# Patient Record
Sex: FEMALE | Race: WHITE | NOT HISPANIC OR LATINO | Employment: UNEMPLOYED | URBAN - METROPOLITAN AREA
[De-identification: names, ages, dates, MRNs, and addresses within clinical notes are randomized per-mention and may not be internally consistent; named-entity substitution may affect disease eponyms.]

---

## 2017-07-02 ENCOUNTER — HOSPITAL ENCOUNTER (INPATIENT)
Facility: HOSPITAL | Age: 32
LOS: 1 days | Discharge: HOME | DRG: 813 | End: 2017-07-03
Attending: EMERGENCY MEDICINE | Admitting: INTERNAL MEDICINE
Payer: MEDICAID

## 2017-07-02 DIAGNOSIS — D69.3 ACUTE ITP: ICD-10-CM

## 2017-07-02 DIAGNOSIS — E03.9 HYPOTHYROIDISM, UNSPECIFIED TYPE: ICD-10-CM

## 2017-07-02 DIAGNOSIS — J44.1 COPD EXACERBATION: Primary | ICD-10-CM

## 2017-07-02 PROCEDURE — 96374 THER/PROPH/DIAG INJ IV PUSH: CPT

## 2017-07-02 PROCEDURE — 99284 EMERGENCY DEPT VISIT MOD MDM: CPT | Mod: 25

## 2017-07-02 PROCEDURE — 96361 HYDRATE IV INFUSION ADD-ON: CPT

## 2017-07-02 RX ORDER — TRAZODONE HYDROCHLORIDE 150 MG/1
150 TABLET ORAL NIGHTLY
COMMUNITY
End: 2020-11-23

## 2017-07-02 RX ORDER — ALPRAZOLAM 1 MG/1
1 TABLET ORAL NIGHTLY PRN
COMMUNITY
End: 2018-12-13 | Stop reason: SDUPTHER

## 2017-07-02 RX ORDER — LEVOTHYROXINE SODIUM 175 UG/1
175 TABLET ORAL DAILY
COMMUNITY
End: 2018-12-13 | Stop reason: SDUPTHER

## 2017-07-03 VITALS
BODY MASS INDEX: 33.99 KG/M2 | WEIGHT: 180 LBS | DIASTOLIC BLOOD PRESSURE: 70 MMHG | TEMPERATURE: 98 F | SYSTOLIC BLOOD PRESSURE: 111 MMHG | OXYGEN SATURATION: 95 % | HEIGHT: 61 IN | RESPIRATION RATE: 16 BRPM | HEART RATE: 55 BPM

## 2017-07-03 PROBLEM — D69.6 THROMBOCYTOPENIA: Status: ACTIVE | Noted: 2017-07-03

## 2017-07-03 PROBLEM — D69.3 ACUTE ITP: Status: ACTIVE | Noted: 2017-07-03

## 2017-07-03 PROBLEM — J44.1 COPD EXACERBATION: Status: ACTIVE | Noted: 2017-07-03

## 2017-07-03 LAB
ALBUMIN SERPL BCP-MCNC: 3.8 G/DL
ALP SERPL-CCNC: 58 U/L
ALT SERPL W/O P-5'-P-CCNC: 14 U/L
ANION GAP SERPL CALC-SCNC: 10 MMOL/L
AST SERPL-CCNC: 24 U/L
BASOPHILS # BLD AUTO: 0 K/UL
BASOPHILS NFR BLD: 0.5 %
BILIRUB SERPL-MCNC: 0.5 MG/DL
BILIRUB UR QL STRIP: NEGATIVE
BUN SERPL-MCNC: 9 MG/DL
CALCIUM SERPL-MCNC: 9.2 MG/DL
CHLORIDE SERPL-SCNC: 103 MMOL/L
CK SERPL-CCNC: 270 U/L
CLARITY UR: CLEAR
CO2 SERPL-SCNC: 29 MMOL/L
COLOR UR: YELLOW
CREAT SERPL-MCNC: 1.2 MG/DL
CRP SERPL-MCNC: 8.4 MG/L
DIFFERENTIAL METHOD: ABNORMAL
EOSINOPHIL # BLD AUTO: 0.2 K/UL
EOSINOPHIL NFR BLD: 3.4 %
ERYTHROCYTE [DISTWIDTH] IN BLOOD BY AUTOMATED COUNT: 14.6 %
EST. GFR  (AFRICAN AMERICAN): >60 ML/MIN/1.73 M^2
EST. GFR  (NON AFRICAN AMERICAN): >60 ML/MIN/1.73 M^2
GLUCOSE SERPL-MCNC: 71 MG/DL
GLUCOSE UR QL STRIP: NEGATIVE
HCT VFR BLD AUTO: 39.5 %
HGB BLD-MCNC: 13.3 G/DL
HGB UR QL STRIP: NEGATIVE
KETONES UR QL STRIP: NEGATIVE
LEUKOCYTE ESTERASE UR QL STRIP: NEGATIVE
LYMPHOCYTES # BLD AUTO: 2.3 K/UL
LYMPHOCYTES NFR BLD: 36.6 %
MCH RBC QN AUTO: 29.8 PG
MCHC RBC AUTO-ENTMCNC: 33.6 %
MCV RBC AUTO: 89 FL
MONOCYTES # BLD AUTO: 0.4 K/UL
MONOCYTES NFR BLD: 6.2 %
NEUTROPHILS # BLD AUTO: 3.3 K/UL
NEUTROPHILS NFR BLD: 53.3 %
NITRITE UR QL STRIP: NEGATIVE
PH UR STRIP: 6 [PH] (ref 5–8)
PLATELET # BLD AUTO: 134 K/UL
PMV BLD AUTO: 9.9 FL
POTASSIUM SERPL-SCNC: 3.8 MMOL/L
PROT SERPL-MCNC: 7.2 G/DL
PROT UR QL STRIP: NEGATIVE
RBC # BLD AUTO: 4.45 M/UL
SODIUM SERPL-SCNC: 142 MMOL/L
SP GR UR STRIP: >=1.03 (ref 1–1.03)
T4 FREE SERPL-MCNC: 0.5 NG/DL
TSH SERPL DL<=0.005 MIU/L-ACNC: 25.84 UIU/ML
URN SPEC COLLECT METH UR: ABNORMAL
UROBILINOGEN UR STRIP-ACNC: NEGATIVE EU/DL
WBC # BLD AUTO: 6.3 K/UL

## 2017-07-03 PROCEDURE — 63600175 PHARM REV CODE 636 W HCPCS: Performed by: EMERGENCY MEDICINE

## 2017-07-03 PROCEDURE — 99900035 HC TECH TIME PER 15 MIN (STAT)

## 2017-07-03 PROCEDURE — 25000003 PHARM REV CODE 250: Performed by: INTERNAL MEDICINE

## 2017-07-03 PROCEDURE — 85025 COMPLETE CBC W/AUTO DIFF WBC: CPT

## 2017-07-03 PROCEDURE — 25000242 PHARM REV CODE 250 ALT 637 W/ HCPCS: Performed by: EMERGENCY MEDICINE

## 2017-07-03 PROCEDURE — 84443 ASSAY THYROID STIM HORMONE: CPT

## 2017-07-03 PROCEDURE — 63600175 PHARM REV CODE 636 W HCPCS: Performed by: HOSPITALIST

## 2017-07-03 PROCEDURE — 82550 ASSAY OF CK (CPK): CPT

## 2017-07-03 PROCEDURE — 86140 C-REACTIVE PROTEIN: CPT

## 2017-07-03 PROCEDURE — 87205 SMEAR GRAM STAIN: CPT

## 2017-07-03 PROCEDURE — 12000002 HC ACUTE/MED SURGE SEMI-PRIVATE ROOM

## 2017-07-03 PROCEDURE — 36415 COLL VENOUS BLD VENIPUNCTURE: CPT

## 2017-07-03 PROCEDURE — 27100107 HC POCKET PEAK FLOW METER

## 2017-07-03 PROCEDURE — 25000003 PHARM REV CODE 250: Performed by: EMERGENCY MEDICINE

## 2017-07-03 PROCEDURE — 81003 URINALYSIS AUTO W/O SCOPE: CPT

## 2017-07-03 PROCEDURE — 80053 COMPREHEN METABOLIC PANEL: CPT

## 2017-07-03 PROCEDURE — 94640 AIRWAY INHALATION TREATMENT: CPT

## 2017-07-03 PROCEDURE — 87070 CULTURE OTHR SPECIMN AEROBIC: CPT

## 2017-07-03 PROCEDURE — 84439 ASSAY OF FREE THYROXINE: CPT

## 2017-07-03 RX ORDER — GLUCAGON 1 MG
1 KIT INJECTION
Status: DISCONTINUED | OUTPATIENT
Start: 2017-07-03 | End: 2017-07-03 | Stop reason: HOSPADM

## 2017-07-03 RX ORDER — MORPHINE SULFATE 2 MG/ML
6 INJECTION, SOLUTION INTRAMUSCULAR; INTRAVENOUS
Status: COMPLETED | OUTPATIENT
Start: 2017-07-03 | End: 2017-07-03

## 2017-07-03 RX ORDER — LEVOTHYROXINE SODIUM 100 UG/1
200 TABLET ORAL
Status: DISCONTINUED | OUTPATIENT
Start: 2017-07-03 | End: 2017-07-03 | Stop reason: HOSPADM

## 2017-07-03 RX ORDER — IBUPROFEN 200 MG
24 TABLET ORAL
Status: DISCONTINUED | OUTPATIENT
Start: 2017-07-03 | End: 2017-07-03 | Stop reason: HOSPADM

## 2017-07-03 RX ORDER — PREDNISONE 20 MG/1
20 TABLET ORAL DAILY
Qty: 8 TABLET | Refills: 0 | Status: SHIPPED | OUTPATIENT
Start: 2017-07-03 | End: 2017-07-07

## 2017-07-03 RX ORDER — IPRATROPIUM BROMIDE AND ALBUTEROL SULFATE 2.5; .5 MG/3ML; MG/3ML
3 SOLUTION RESPIRATORY (INHALATION) EVERY 4 HOURS
Qty: 50 VIAL | Refills: 2 | Status: SHIPPED | OUTPATIENT
Start: 2017-07-03 | End: 2020-11-23

## 2017-07-03 RX ORDER — ACETAMINOPHEN 325 MG/1
650 TABLET ORAL EVERY 8 HOURS PRN
Status: DISCONTINUED | OUTPATIENT
Start: 2017-07-03 | End: 2017-07-03 | Stop reason: HOSPADM

## 2017-07-03 RX ORDER — ALBUTEROL SULFATE 2.5 MG/.5ML
2.5 SOLUTION RESPIRATORY (INHALATION)
Status: COMPLETED | OUTPATIENT
Start: 2017-07-03 | End: 2017-07-03

## 2017-07-03 RX ORDER — TRAZODONE HYDROCHLORIDE 50 MG/1
150 TABLET ORAL NIGHTLY
Status: DISCONTINUED | OUTPATIENT
Start: 2017-07-03 | End: 2017-07-03 | Stop reason: HOSPADM

## 2017-07-03 RX ORDER — PREDNISONE 20 MG/1
40 TABLET ORAL ONCE
Status: COMPLETED | OUTPATIENT
Start: 2017-07-03 | End: 2017-07-03

## 2017-07-03 RX ORDER — IBUPROFEN 200 MG
16 TABLET ORAL
Status: DISCONTINUED | OUTPATIENT
Start: 2017-07-03 | End: 2017-07-03 | Stop reason: HOSPADM

## 2017-07-03 RX ORDER — ALPRAZOLAM 1 MG/1
1 TABLET ORAL NIGHTLY PRN
Status: DISCONTINUED | OUTPATIENT
Start: 2017-07-03 | End: 2017-07-03 | Stop reason: HOSPADM

## 2017-07-03 RX ORDER — IPRATROPIUM BROMIDE AND ALBUTEROL SULFATE 2.5; .5 MG/3ML; MG/3ML
3 SOLUTION RESPIRATORY (INHALATION) EVERY 4 HOURS
Status: DISCONTINUED | OUTPATIENT
Start: 2017-07-03 | End: 2017-07-03 | Stop reason: HOSPADM

## 2017-07-03 RX ADMIN — PREDNISONE 40 MG: 20 TABLET ORAL at 10:07

## 2017-07-03 RX ADMIN — ALBUTEROL SULFATE 2.5 MG: 2.5 SOLUTION RESPIRATORY (INHALATION) at 12:07

## 2017-07-03 RX ADMIN — LEVOTHYROXINE SODIUM 200 MCG: 100 TABLET ORAL at 05:07

## 2017-07-03 RX ADMIN — SODIUM CHLORIDE 1000 ML: 0.9 INJECTION, SOLUTION INTRAVENOUS at 12:07

## 2017-07-03 RX ADMIN — MORPHINE SULFATE 6 MG: 2 INJECTION, SOLUTION INTRAMUSCULAR; INTRAVENOUS at 03:07

## 2017-07-03 NOTE — NURSING
"Pt verbalizing concerns as stated by pt, "I need to get to the methadone clinic today at noon so I can receive my medication for today and tomorrow because tomorrow they are closed." Placed call to Dr. Vazquez on call for Dr. Frank and left a message for her to return call regarding.  "

## 2017-07-03 NOTE — PROGRESS NOTES
95% sats on room air. One time albuterol aero tx given and tolerated well. PF pre 300 and post 320. Good effort

## 2017-07-03 NOTE — NURSING
Pt had a bowel movement with a scant amount of blood noted on the outer surface of stool. Pt denies having hemorrhoids. Pt did state she strained to have her bowel movement and stool appears hard in the commode. Pt insisted on discharging at this time due to her limited time to obtain her methadone from the clinic she attends. Dr. Vazquez aware. Pt refused her breathing treatment. Discharge instructions reviewed with pt. Pt refused to wait for her nebulizer machine to be delivered and states she will return this afternoon to  the nebulizer. Informed Sarah  regarding the same.   Pt assisted down to main entrance in wheelchair. Once outside pt got out of the wheelchair and insisted on walking to her vehicle of transportation by her friend. Pt discharged at this time in stable condition.

## 2017-07-03 NOTE — ED PROVIDER NOTES
"Encounter Date: 2017    SCRIBE #1 NOTE: IMalachi am scribing for, and in the presence of, Dr. Bonner.       History     Chief Complaint   Patient presents with    Joint Pain     both legs and "bone pain"    Rash     Rash deepak lower legs.  Just completed ABX    Abdominal Cramping     Intermittent pain       2017 12:03 AM     Chief Complaint: Joint pain and rash      The patient is a 32 y.o. female with a history of thyroid cancer status post thyroidectomy who presents to the ED  with an gradual worsen rash and joint pain. The symptoms began 3 days ago.      Pt reports taken levaquin for pneumonia that was clinically diagnosed by her primary care doctor in the office 5 days ago.  Two days after she developed a rash on her left foot. The following day the same rash appeared on her right foot. The upward progressing rash is associated with pain in both feet. The pt also reports swelling and pain in both ankles. She also has difficulty ambulating. She denies any recent sick contact and medication changes. Pt reports using albuterol for the pneumonia. She has a PSHx of thyroidectomy and hysterectomy. No known drug allergies noted.            The history is provided by the patient.     Review of patient's allergies indicates:  No Known Allergies  Past Medical History:   Diagnosis Date    Cancer     Thyroid    History of PCOS     Hypoglycemia     Tachycardia     Thyroid disease     hypothyroid, pappilary carcinoma     Past Surgical History:   Procedure Laterality Date     SECTION      x3    HYSTERECTOMY      thyroid nodule biopsy      THYROIDECTOMY      10/17/13    TONSILLECTOMY, ADENOIDECTOMY      TUBAL LIGATION       Family History   Problem Relation Age of Onset    Breast cancer Maternal Grandmother     Ovarian cancer Maternal Grandmother     Cataracts Maternal Grandmother     Cancer Mother      VULVAR AND CERVICAL    Ovarian cancer Mother     Colon cancer Neg Hx      Social " History   Substance Use Topics    Smoking status: Current Every Day Smoker     Packs/day: 1.00     Types: Cigarettes    Smokeless tobacco: Never Used    Alcohol use Yes      Comment: social     Review of Systems   Constitutional: Positive for fatigue.   Respiratory: Negative for shortness of breath.    Cardiovascular: Negative for chest pain.   Gastrointestinal: Negative for abdominal pain.   Genitourinary: Negative for dysuria.   Musculoskeletal: Positive for arthralgias.   Skin: Positive for rash.   Allergic/Immunologic: Negative for immunocompromised state.   Neurological: Positive for weakness.   Hematological: Does not bruise/bleed easily.   Psychiatric/Behavioral: Negative for confusion.       Physical Exam     Initial Vitals [07/02/17 2335]   BP Pulse Resp Temp SpO2   134/83 71 18 97.9 °F (36.6 °C) 95 %      MAP       100         Physical Exam    Nursing note and vitals reviewed.  Constitutional: She appears well-nourished. No distress.   HENT:   Head: Normocephalic and atraumatic.   Mouth/Throat: Oropharynx is clear and moist.   Hirsutism   Eyes: Conjunctivae and EOM are normal.   Neck: Normal range of motion. Neck supple.   Cardiovascular: Normal rate and regular rhythm.   Pulmonary/Chest: She has wheezes. She has rhonchi.   Globally rhonchorous breath sounds without rales or respiratory distress   Abdominal: She exhibits no distension. There is no tenderness.   Musculoskeletal: She exhibits edema and tenderness.   Minimal edema and tenderness of the lower extremities with diffuse petechial rash progressing up the legs, but currently below the knees bilaterally, no purpura   Neurological: She is alert and oriented to person, place, and time.   Skin: Skin is warm and dry. Rash noted.   Psychiatric: She has a normal mood and affect. Thought content normal.         ED Course   Procedures  Labs Reviewed - No data to display          Medical Decision Making:   Initial Assessment:   The patient was interviewed  and examined and does appear to be progressing with a petechial rash.  She has been taking Levaquin which may be associated with worsening joint and tendon pain.  IV was established and she was rather bolus hydration and IV analgesic.  Screening labs will be obtained, including thyroid studies.  Chest x-ray will be obtained for reports of worsening cough and rhonchi on physical examination.  She is a very advanced smoker.  Differential Diagnosis:   DDX include, but are not limited to, community acquired pneumonia, hypothyroidism, ITP, TTP, meningitis, rhabdomyolysis, acute kidney injury, congestive heart failure, nephrotic syndrome  ED Management:  Labs today are remarkable for evidence of new thrombocythemia at a value of 134,000.  TSH is elevated at 26 with a low level free T4 0.5.  Patient does warrant admission for further monitoring of her Thrombocytopenia, rash, and thyroid function.  Case was discussed with the admitting doctor, Dr. Louise.  Patient was updated on the plan of care and in agreement.  She was transferred to a [facility] bed in guarded condition.            Scribe Attestation:   Scribe #1: I performed the above scribed service and the documentation accurately describes the services I performed. I attest to the accuracy of the note.    Attending Attestation:           Physician Attestation for Scribe:  Physician Attestation Statement for Scribe #1: I, Dr. Bonner, reviewed documentation, as scribed by Malachi Purvis in my presence, and it is both accurate and complete.                 ED Course     Clinical Impression:   The primary encounter diagnosis was Hypothyroidism, unspecified type. A diagnosis of Acute ITP was also pertinent to this visit.    Disposition:   Disposition: Admitted  Condition: Fair                        Miguel Ángel Bonner MD  07/03/17 0443       Miguel Ángel Bonner MD  07/03/17 0443

## 2017-07-03 NOTE — PROGRESS NOTES
I attempted to complete dc assessment however pt is in the bathroom getting dressed for discharge. I updated the pts nurse, Sarika that a nebulizer can not be ordered until there is a completed H&P in the computer. Sarah Hyde LMSW

## 2017-07-03 NOTE — ASSESSMENT & PLAN NOTE
Acute-possible causes meds(levaquin) -causing ITP  HIV, hep C, hypersplenism, DARIO. Anti-platelet anitibodies. --all need evaluation / fu outpatient.   Starting po prednisone for copd-will help if auto-immune   Return if evidence of bleeding or worsening rash. -check PT/INR.   Consider further evaluation by hematology if wirse   No evidence of sepsis.   DC levaquin.

## 2017-07-03 NOTE — HPI
The patient is a 32 y.o. female with a history of thyroid cancer status post thyroidectomy who presents to the ED  with an gradual worsen rash and joint pain. The symptoms began 3 days ago.      Pt reports taken levaquin for pneumonia that was clinically diagnosed by her primary care doctor in the office 5 days ago.   Two days after she developed a rash on her left foot. The following day the same rash appeared on her right foot. The upward progressing rash is associated with pain in both feet. And to calves and joint pain lower extremities.The pt also reports swelling and pain in both ankles. She also has difficulty ambulating. She denies any recent sick contact and medication changes. Pt reports using albuterol for the pneumonia. She has a PSHx of thyroidectomy and hysterectomy. No known drug allergies noted.    She presented to ER and noted to have petechia lower extremities, non-painful and platelet count of 134k. She has no bleeding.

## 2017-07-03 NOTE — PROGRESS NOTES
I faxed face sheet, H&P, and correct home health orders to [facility] at [phone]. Sarah Hyde LMSW

## 2017-07-03 NOTE — PROGRESS NOTES
I received approval from [facility] with [facility] to pull the nebulizer from the DME closet. I completed all paperwork and left the pt instructions, nebulizer, delivery ticket and rental agreement with the pts nurse, Sarika. The pt left for a medical appt and will return to  the nebulizer and sign the paperwork. Once completed, today, I will return the signed paperwork to the DME closet. Sarah Hyde LMSW

## 2017-07-03 NOTE — HOSPITAL COURSE
Admitted with thrombocytopenia, petechia=no evidence bleeding   ? levaquin which was dc. Po prednisone ordered for copd and possible ITP but no further work up done in house and no evidence of bleeding. Pt needed to get to methadone clinic so dc and advised to fu with pcp-has appt 7/6 or return to er if rash/joint pain worse or evidence of bleeding/infection.

## 2017-07-03 NOTE — ASSESSMENT & PLAN NOTE
Acute-recent rx levaquin. Has albuterol inhaler  Dc tobacco   Po prednisone x  5days.  Ordered nebulizer for home and duoneb rx sent to pharmacy

## 2017-07-03 NOTE — H&P
"[facility] Medicine  History & Physical    Patient Name: Corina Liu  MRN: [id number]  Admission Date: 2017  Attending Physician: Terry Frank MD   Primary Care Provider: Santosh Carlson MD         Patient information was obtained from patient and ER records.     Subjective:     Principal Problem:Thrombocytopenia    Chief Complaint:   Chief Complaint   Patient presents with    Joint Pain     both legs and "bone pain"    Rash     Rash deepak lower legs.  Just completed ABX    Abdominal Cramping     Intermittent pain        HPI: The patient is a 32 y.o. female with a history of thyroid cancer status post thyroidectomy who presents to the ED  with an gradual worsen rash and joint pain. The symptoms began 3 days ago.      Pt reports taken levaquin for pneumonia that was clinically diagnosed by her primary care doctor in the office 5 days ago.   Two days after she developed a rash on her left foot. The following day the same rash appeared on her right foot. The upward progressing rash is associated with pain in both feet. And to calves and joint pain lower extremities.The pt also reports swelling and pain in both ankles. She also has difficulty ambulating. She denies any recent sick contact and medication changes. Pt reports using albuterol for the pneumonia. She has a PSHx of thyroidectomy and hysterectomy. No known drug allergies noted.    She presented to ER and noted to have petechia lower extremities, non-painful and platelet count of 134k. She has no bleeding.          Past Medical History:   Diagnosis Date    Cancer     Thyroid    Hepatitis C 2016    History of PCOS     Hypoglycemia     Tachycardia     Thyroid disease     hypothyroid, pappilary carcinoma       Past Surgical History:   Procedure Laterality Date     SECTION      x3    HYSTERECTOMY      thyroid nodule biopsy      THYROIDECTOMY      10/17/13    TONSILLECTOMY, ADENOIDECTOMY      TUBAL LIGATION "         Review of patient's allergies indicates:   Allergen Reactions    Levaquin [levofloxacin] Swelling and Rash     Joint pain, abdominal pain       No current facility-administered medications on file prior to encounter.      Current Outpatient Prescriptions on File Prior to Encounter   Medication Sig    glucagon (human recombinant) inj 1mg/mL kit Inject as per directions IM    ibuprofen (ADVIL,MOTRIN) 800 MG tablet Take 1 tablet (800 mg total) by mouth every 6 (six) hours as needed for Pain.    [DISCONTINUED] blood sugar diagnostic Strp Please Dispense True Test- Test Strips and Lancet     Family History     Problem Relation (Age of Onset)    Breast cancer Maternal Grandmother    Cancer Mother    Cataracts Maternal Grandmother    Ovarian cancer Maternal Grandmother, Mother        Social History Main Topics    Smoking status: Current Every Day Smoker     Packs/day: 1.00     Types: Cigarettes    Smokeless tobacco: Never Used    Alcohol use No    Drug use: No      Comment: Prescription Methadone    Sexual activity: Yes     Partners: Male     Birth control/ protection: Surgical, None     Review of Systems   Constitutional: Negative.    HENT: Negative.    Eyes: Negative.    Respiratory: Positive for cough, shortness of breath and wheezing.    Cardiovascular: Negative.    Gastrointestinal: Negative.    Endocrine: Positive for heat intolerance.   Genitourinary: Positive for difficulty urinating.   Musculoskeletal: Negative.         Joint pain   Skin: Positive for rash.   Allergic/Immunologic: Negative.    Hematological: Negative for adenopathy. Does not bruise/bleed easily.        Petechia-lower extremities   Psychiatric/Behavioral: Negative.      Objective:     Vital Signs (Most Recent):  Temp: 97.9 °F (36.6 °C) (07/03/17 0446)  Pulse: (!) 57 (07/03/17 0446)  Resp: 16 (07/03/17 0446)  BP: 121/78 (07/03/17 0446)  SpO2: 100 % (07/03/17 0446) Vital Signs (24h Range):  Temp:  [97.9 °F (36.6 °C)-98.8 °F (37.1  °C)] 97.9 °F (36.6 °C)  Pulse:  [54-71] 57  Resp:  [16-18] 16  SpO2:  [90 %-100 %] 100 %  BP: (100-134)/(54-83) 121/78     Weight: 81.6 kg (180 lb)  Body mass index is 34.01 kg/m².    Physical Exam   Constitutional: She is oriented to person, place, and time. She appears well-developed and well-nourished. No distress.   HENT:   Head: Normocephalic.   Right Ear: External ear normal.   Left Ear: External ear normal.   Nose: Nose normal.   Mouth/Throat: Oropharynx is clear and moist.   Eyes: Conjunctivae are normal. No scleral icterus.   Neck: Normal range of motion. Neck supple. No JVD present.   Cardiovascular: Normal rate, regular rhythm, normal heart sounds and intact distal pulses.    Pulmonary/Chest: Effort normal. No apnea and no tachypnea. No respiratory distress. She has rhonchi in the right middle field, the right lower field, the left middle field and the left lower field.   Abdominal: Soft. Bowel sounds are normal.   Musculoskeletal: Normal range of motion. She exhibits deformity. She exhibits no edema or tenderness.   Neurological: She is alert and oriented to person, place, and time. She exhibits normal muscle tone.   Skin: Capillary refill takes less than 2 seconds. Rash noted. She is not diaphoretic.   Petechia feet, ankles calves    Psychiatric: Her behavior is normal.   Nursing note and vitals reviewed.       Significant Labs: All pertinent labs within the past 24 hours have been reviewed.    Significant Imaging: I have reviewed and interpreted all pertinent imaging results/findings within the past 24 hours.    Assessment/Plan:     COPD exacerbation    Acute-recent rx levaquin. Has albuterol inhaler  Dc tobacco   Po prednisone x  5days.  Ordered nebulizer for home and duoneb rx sent to pharmacy               Narcotic addiction    Referred to fu at methadone clinic. Reports 60mg daily -not noted in home medications            Abnormal thyroid function test    ? Recent increase in synthroid-needs fu  with pcp   Lab Results   Component Value Date    TSH 25.841 (H) 07/03/2017             * Thrombocytopenia    Acute-possible causes meds(levaquin) -causing ITP  HIV, hep C, hypersplenism, DARIO. Anti-platelet anitibodies. --all need evaluation / fu outpatient.   Starting po prednisone for copd-will help if auto-immune   Return if evidence of bleeding or worsening rash. -check PT/INR.   Consider further evaluation by hematology if wirse   No evidence of sepsis.   DC levaquin.               VTE Risk Mitigation         Ordered     Medium Risk of VTE  Once      07/03/17 0445     Place sequential compression device  Until discontinued      07/03/17 0445        Sarika Vazquez MD  Department of Hospital Medicine   [facility]

## 2017-07-03 NOTE — ASSESSMENT & PLAN NOTE
? Recent increase in synthroid-needs fu with pcp   Lab Results   Component Value Date    TSH 25.841 (H) 07/03/2017

## 2017-07-03 NOTE — SUBJECTIVE & OBJECTIVE
Past Medical History:   Diagnosis Date    Cancer     Thyroid    Hepatitis C 2016    History of PCOS     Hypoglycemia     Tachycardia     Thyroid disease     hypothyroid, pappilary carcinoma       Past Surgical History:   Procedure Laterality Date     SECTION      x3    HYSTERECTOMY      thyroid nodule biopsy      THYROIDECTOMY      10/17/13    TONSILLECTOMY, ADENOIDECTOMY      TUBAL LIGATION         Review of patient's allergies indicates:   Allergen Reactions    Levaquin [levofloxacin] Swelling and Rash     Joint pain, abdominal pain       No current facility-administered medications on file prior to encounter.      Current Outpatient Prescriptions on File Prior to Encounter   Medication Sig    glucagon (human recombinant) inj 1mg/mL kit Inject as per directions IM    ibuprofen (ADVIL,MOTRIN) 800 MG tablet Take 1 tablet (800 mg total) by mouth every 6 (six) hours as needed for Pain.    [DISCONTINUED] blood sugar diagnostic Strp Please Dispense True Test- Test Strips and Lancet     Family History     Problem Relation (Age of Onset)    Breast cancer Maternal Grandmother    Cancer Mother    Cataracts Maternal Grandmother    Ovarian cancer Maternal Grandmother, Mother        Social History Main Topics    Smoking status: Current Every Day Smoker     Packs/day: 1.00     Types: Cigarettes    Smokeless tobacco: Never Used    Alcohol use No    Drug use: No      Comment: Prescription Methadone    Sexual activity: Yes     Partners: Male     Birth control/ protection: Surgical, None     Review of Systems   Constitutional: Negative.    HENT: Negative.    Eyes: Negative.    Respiratory: Positive for cough, shortness of breath and wheezing.    Cardiovascular: Negative.    Gastrointestinal: Negative.    Endocrine: Positive for heat intolerance.   Genitourinary: Positive for difficulty urinating.   Musculoskeletal: Negative.         Joint pain   Skin: Positive for rash.   Allergic/Immunologic:  Negative.    Hematological: Negative for adenopathy. Does not bruise/bleed easily.        Petechia-lower extremities   Psychiatric/Behavioral: Negative.      Objective:     Vital Signs (Most Recent):  Temp: 97.9 °F (36.6 °C) (07/03/17 0446)  Pulse: (!) 57 (07/03/17 0446)  Resp: 16 (07/03/17 0446)  BP: 121/78 (07/03/17 0446)  SpO2: 100 % (07/03/17 0446) Vital Signs (24h Range):  Temp:  [97.9 °F (36.6 °C)-98.8 °F (37.1 °C)] 97.9 °F (36.6 °C)  Pulse:  [54-71] 57  Resp:  [16-18] 16  SpO2:  [90 %-100 %] 100 %  BP: (100-134)/(54-83) 121/78     Weight: 81.6 kg (180 lb)  Body mass index is 34.01 kg/m².    Physical Exam   Constitutional: She is oriented to person, place, and time. She appears well-developed and well-nourished. No distress.   HENT:   Head: Normocephalic.   Right Ear: External ear normal.   Left Ear: External ear normal.   Nose: Nose normal.   Mouth/Throat: Oropharynx is clear and moist.   Eyes: Conjunctivae are normal. No scleral icterus.   Neck: Normal range of motion. Neck supple. No JVD present.   Cardiovascular: Normal rate, regular rhythm, normal heart sounds and intact distal pulses.    Pulmonary/Chest: Effort normal. No apnea and no tachypnea. No respiratory distress. She has rhonchi in the right middle field, the right lower field, the left middle field and the left lower field.   Abdominal: Soft. Bowel sounds are normal.   Musculoskeletal: Normal range of motion. She exhibits deformity. She exhibits no edema or tenderness.   Neurological: She is alert and oriented to person, place, and time. She exhibits normal muscle tone.   Skin: Capillary refill takes less than 2 seconds. Rash noted. She is not diaphoretic.   Petechia feet, ankles calves    Psychiatric: Her behavior is normal.   Nursing note and vitals reviewed.       Significant Labs: All pertinent labs within the past 24 hours have been reviewed.    Significant Imaging: I have reviewed and interpreted all pertinent imaging results/findings  within the past 24 hours.

## 2017-07-03 NOTE — PLAN OF CARE
07/03/17 1220   Final Note   Assessment Type Final Discharge Note   Discharge Disposition Home   Discharge planning education complete? Yes

## 2017-07-03 NOTE — DISCHARGE SUMMARY
[facility] Medicine  Discharge Summary      Patient Name: Corina Liu  MRN: [id number]  Admission Date: 7/2/2017  Hospital Length of Stay: 0 days  Discharge Date and Time:  07/03/2017 10:54 AM  Attending Physician: Terry Frank MD   Discharging Provider: Sarika Vazquez MD  Primary Care Provider: Santosh Carlson MD      HPI:   The patient is a 32 y.o. female with a history of thyroid cancer status post thyroidectomy who presents to the ED  with an gradual worsen rash and joint pain. The symptoms began 3 days ago.      Pt reports taken levaquin for pneumonia that was clinically diagnosed by her primary care doctor in the office 5 days ago.   Two days after she developed a rash on her left foot. The following day the same rash appeared on her right foot. The upward progressing rash is associated with pain in both feet. And to calves and joint pain lower extremities.The pt also reports swelling and pain in both ankles. She also has difficulty ambulating. She denies any recent sick contact and medication changes. Pt reports using albuterol for the pneumonia. She has a PSHx of thyroidectomy and hysterectomy. No known drug allergies noted.    She presented to ER and noted to have petechia lower extremities, non-painful and platelet count of 134k. She has no bleeding.          * No surgery found *      Indwelling Lines/Drains at time of discharge:   Lines/Drains/Airways          No matching active lines, drains, or airways        Hospital Course:   Admitted with thrombocytopenia, petechia=no evidence bleeding   ? levaquin which was dc. Po prednisone ordered for copd and possible ITP but no further work up done in house and no evidence of bleeding. Pt needed to get to methadone clinic so dc and advised to fu with pcp-has appt 7/6 or return to er if rash/joint pain worse or evidence of bleeding/infection.      Consults:     Significant Diagnostic Studies:       Pending Diagnostic Studies:      "None        Final Active Diagnoses:    Diagnosis Date Noted POA    PRINCIPAL PROBLEM:  Thrombocytopenia [D69.6] 07/03/2017 Yes    COPD exacerbation [J44.1] 07/03/2017 Yes    Narcotic addiction [F11.20] 09/17/2013 Yes    Abnormal thyroid function test [R94.6] 01/11/2013 Yes      Problems Resolved During this Admission:    Diagnosis Date Noted Date Resolved POA      No new Assessment & Plan notes have been filed under this hospital service since the last note was generated.  Service: Hospital Medicine      Discharged Condition: fair    Disposition: Home or Self Care    Follow Up:  Follow-up Information     Santosh Carlson MD.    Specialty:  Internal Medicine  Why:  as scheduled   Contact information:  [address] [place] [address] [address]  [facility] [address]  [phone]             [facility]    Specialty:  DME Provider  Why:  DME-nebulizer  Contact information:  [address]  [address]  [place] [address]  [phone]                 Patient Instructions:     NEBULIZER FOR HOME USE   Order Comments: Copd   Order Specific Question Answer Comments   Height: 5' 1" (1.549 m)    Weight: 81.6 kg (180 lb)    Length of need (1-99 months): 99      Diet general     Activity as tolerated       Medications:  Reconciled Home Medications:   Current Discharge Medication List      START taking these medications    Details   albuterol-ipratropium 2.5mg-0.5mg/3mL (DUO-NEB) 0.5 mg-3 mg(2.5 mg base)/3 mL nebulizer solution Take 3 mLs by nebulization every 4 (four) hours. Rescue  Qty: 50 vial, Refills: 2      predniSONE (DELTASONE) 20 MG tablet Take 1 tablet (20 mg total) by mouth once daily.  Qty: 8 tablet, Refills: 0         CONTINUE these medications which have NOT CHANGED    Details   alprazolam (XANAX) 1 MG tablet Take 1 mg by mouth nightly as needed for Anxiety.      levothyroxine (SYNTHROID, LEVOTHROID) 175 MCG tablet Take 175 mcg by mouth once daily.      trazodone (DESYREL) 150 MG tablet Take 150 mg by mouth every " evening.      glucagon (human recombinant) inj 1mg/mL kit Inject as per directions IM  Qty: 1 kit, Refills: 3      ibuprofen (ADVIL,MOTRIN) 800 MG tablet Take 1 tablet (800 mg total) by mouth every 6 (six) hours as needed for Pain.  Qty: 20 tablet, Refills: 0         STOP taking these medications       blood sugar diagnostic Strp Comments:   Reason for Stopping:         calcium-vitamin D3 (CALCIUM 500 + D) 500 mg(1,250mg) -200 unit per tablet Comments:   Reason for Stopping:         duloxetine (CYMBALTA) 30 MG capsule Comments:   Reason for Stopping:         oxycodone-acetaminophen (PERCOCET)  mg per tablet Comments:   Reason for Stopping:         pantoprazole (PROTONIX) 40 MG tablet Comments:   Reason for Stopping:             Time spent on the discharge of patient: 35 minutes    Sarika Vazquez MD  Department of Hospital Medicine  [facility]

## 2017-07-03 NOTE — PLAN OF CARE
32 year old female remotely reviewed.  Presents with petechial rash after finishing course of Levaquin for pneumonia.  Also associated myalgias.  CBC singificant for thrombocytopenia and evidence of subclinical hypothyroidism on labs.  She remains hemodynamically stable with no other complaints.  Drug induced ITP vs. Other etiology.    Plan:  1. Peripheral smear  2. CRP  3. Trend platelets  4. Consider derm consult if no resolution  5. Admit to obs

## 2017-07-05 ENCOUNTER — PATIENT OUTREACH (OUTPATIENT)
Dept: ADMINISTRATIVE | Facility: CLINIC | Age: 32
End: 2017-07-05

## 2017-07-05 LAB
BACTERIA SPEC AEROBE CULT: NORMAL
GRAM STN SPEC: NORMAL

## 2017-08-21 ENCOUNTER — HOSPITAL ENCOUNTER (EMERGENCY)
Facility: HOSPITAL | Age: 32
Discharge: HOME | End: 2017-08-21
Attending: EMERGENCY MEDICINE
Payer: MEDICAID

## 2017-08-21 VITALS
OXYGEN SATURATION: 99 % | HEART RATE: 89 BPM | TEMPERATURE: 98 F | SYSTOLIC BLOOD PRESSURE: 124 MMHG | WEIGHT: 165 LBS | DIASTOLIC BLOOD PRESSURE: 80 MMHG | BODY MASS INDEX: 31.18 KG/M2 | RESPIRATION RATE: 16 BRPM

## 2017-08-21 DIAGNOSIS — L02.512 ABSCESS OF LEFT HAND: Primary | ICD-10-CM

## 2017-08-21 LAB
B-HCG UR QL: NEGATIVE
CTP QC/QA: YES

## 2017-08-21 PROCEDURE — 81025 URINE PREGNANCY TEST: CPT | Performed by: PHYSICIAN ASSISTANT

## 2017-08-21 PROCEDURE — 10061 I&D ABSCESS COMP/MULTIPLE: CPT

## 2017-08-21 PROCEDURE — 25000003 PHARM REV CODE 250: Performed by: PHYSICIAN ASSISTANT

## 2017-08-21 PROCEDURE — 99284 EMERGENCY DEPT VISIT MOD MDM: CPT | Mod: 25

## 2017-08-21 RX ORDER — CLINDAMYCIN HYDROCHLORIDE 300 MG/1
300 CAPSULE ORAL EVERY 6 HOURS
Qty: 28 CAPSULE | Refills: 0 | Status: SHIPPED | OUTPATIENT
Start: 2017-08-21 | End: 2017-08-28

## 2017-08-21 RX ORDER — LIDOCAINE HYDROCHLORIDE 10 MG/ML
10 INJECTION, SOLUTION EPIDURAL; INFILTRATION; INTRACAUDAL; PERINEURAL
Status: COMPLETED | OUTPATIENT
Start: 2017-08-21 | End: 2017-08-21

## 2017-08-21 RX ORDER — CLINDAMYCIN HYDROCHLORIDE 150 MG/1
300 CAPSULE ORAL
Status: COMPLETED | OUTPATIENT
Start: 2017-08-21 | End: 2017-08-21

## 2017-08-21 RX ADMIN — LIDOCAINE HYDROCHLORIDE 100 MG: 10 INJECTION, SOLUTION EPIDURAL; INFILTRATION; INTRACAUDAL; PERINEURAL at 03:08

## 2017-08-21 RX ADMIN — CLINDAMYCIN HYDROCHLORIDE 300 MG: 150 CAPSULE ORAL at 04:08

## 2017-08-21 NOTE — ED PROVIDER NOTES
Encounter Date: 2017       History     Chief Complaint   Patient presents with    Abscess     left hand     Patient is a 32 year old female who presents with abscess to left hand for 6 days. She reports PMH significant for hepatitis C. She reports she injected meth into her left hand 6 days ago. She reports worsening swelling, redness and pain to the left hand. She states two days ago she was placed on Bactrim and denied any significant improvement. She denied drainage. She denied fever. Pain is constant and moderate.       The history is provided by the patient.     Review of patient's allergies indicates:   Allergen Reactions    Levaquin [levofloxacin] Swelling and Rash     Joint pain, abdominal pain     Past Medical History:   Diagnosis Date    Cancer     Thyroid    Hepatitis C 2016    History of PCOS     Hypoglycemia     Tachycardia     Thyroid disease     hypothyroid, pappilary carcinoma     Past Surgical History:   Procedure Laterality Date     SECTION      x3    HYSTERECTOMY      thyroid nodule biopsy      THYROIDECTOMY      10/17/13    TONSILLECTOMY, ADENOIDECTOMY      TUBAL LIGATION       Family History   Problem Relation Age of Onset    Breast cancer Maternal Grandmother     Ovarian cancer Maternal Grandmother     Cataracts Maternal Grandmother     Cancer Mother      VULVAR AND CERVICAL    Ovarian cancer Mother     Colon cancer Neg Hx      Social History   Substance Use Topics    Smoking status: Current Every Day Smoker     Packs/day: 1.00     Types: Cigarettes    Smokeless tobacco: Never Used    Alcohol use No     Review of Systems   Constitutional: Negative for chills and fever.   HENT: Negative for congestion and sore throat.    Respiratory: Negative for cough and shortness of breath.    Cardiovascular: Negative for chest pain.   Gastrointestinal: Negative for abdominal pain, diarrhea, nausea and vomiting.   Genitourinary: Negative for dysuria.   Musculoskeletal:  Negative for back pain.   Skin: Positive for color change and wound. Negative for rash.   Neurological: Negative for weakness.   Hematological: Does not bruise/bleed easily.       Physical Exam     Initial Vitals [08/21/17 1529]   BP Pulse Resp Temp SpO2   124/80 89 16 98.2 °F (36.8 °C) 99 %      MAP       94.67         Physical Exam    Nursing note and vitals reviewed.  Constitutional: She appears well-developed and well-nourished. No distress.   HENT:   Head: Normocephalic and atraumatic.   Right Ear: External ear normal.   Left Ear: External ear normal.   Eyes: Conjunctivae are normal. Pupils are equal, round, and reactive to light. Right eye exhibits no discharge. Left eye exhibits no discharge.   Neck: Normal range of motion. Neck supple.   Cardiovascular: Normal rate, regular rhythm and normal heart sounds. Exam reveals no gallop and no friction rub.    No murmur heard.  Pulmonary/Chest: Breath sounds normal. She has no wheezes. She has no rhonchi. She has no rales.   Abdominal: Soft. Bowel sounds are normal. There is no tenderness. There is no guarding.   Musculoskeletal: Normal range of motion.        Left hand: She exhibits tenderness and swelling. She exhibits normal range of motion and no bony tenderness.        Hands:  Neurological: She is alert.   Skin: Skin is warm and dry.         ED Course   I & D - Incision and Drainage  Date/Time: 8/21/2017 6:35 PM  Performed by: LUIZ SNEED  Authorized by: JUSTINA ABURTO III   Type: abscess  Body area: upper extremity  Location details: left hand  Anesthesia: local infiltration    Anesthesia:  Local Anesthetic: lidocaine 1% without epinephrine  Anesthetic total: 2 mL  Scalpel size: 11  Incision type: single straight  Complexity: complex  Drainage: pus  Drainage amount: moderate  Wound treatment: incision,  drainage,  expression of material and  wound packed  Packing material: 1/4 in gauze  Patient tolerance: Patient tolerated the procedure well  with no immediate complications        Labs Reviewed   POCT URINE PREGNANCY             Medical Decision Making:   History:   Old Medical Records: I decided to obtain old medical records.  Clinical Tests:   Radiological Study: Ordered       APC / Resident Notes:   This is an emergent evaluation of a 32 year old female with complaint of abscess to right hand. Xray showed no foreign body. Patient is noted to have a 3 cm x 3 cm area of erythema, induration, tenderness and warmth. Patient denied fever. I&D performed, see procedure note. Patient tolerated well. Patient was given instructions on wound care. Antibiotics given. Follow up with primary care provider. Return precautions given. All questions answered. Case was discussed with Dr. House who is in agreement with the plan of care.            Attending Attestation:     Physician Attestation Statement for NP/PA:   I discussed this assessment and plan of this patient with the NP/PA, but I did not personally examine the patient. The face to face encounter was performed by the NP/PA.    Other NP/PA Attestation Additions:    History of Present Illness: Hand abscess                   ED Course     Clinical Impression:   The encounter diagnosis was Abscess of left hand.                           Amita Castillo PA-C  08/21/17 1837       Donovan House III, MD  08/21/17 2032

## 2017-08-21 NOTE — DISCHARGE INSTRUCTIONS
Stop bactrim. Take new antibiotic as prescribed.  Keep wound clean and dry. Change dressing daily.  See your primary care provider in one week.  For worsening symptoms, chest pain, shortness of breath, increased abdominal pain, high grade fever, stroke or stroke like symptoms, immediately go to the nearest Emergency Room or call 911 as soon as possible.

## 2018-05-25 ENCOUNTER — HOSPITAL ENCOUNTER (EMERGENCY)
Facility: HOSPITAL | Age: 33
Discharge: HOME | End: 2018-05-26
Attending: FAMILY MEDICINE
Payer: MEDICAID

## 2018-05-25 DIAGNOSIS — E87.6 HYPOKALEMIA: Primary | ICD-10-CM

## 2018-05-25 PROCEDURE — 85025 COMPLETE CBC W/AUTO DIFF WBC: CPT

## 2018-05-25 PROCEDURE — 99284 EMERGENCY DEPT VISIT MOD MDM: CPT | Mod: 25

## 2018-05-25 RX ORDER — IPRATROPIUM BROMIDE AND ALBUTEROL SULFATE 2.5; .5 MG/3ML; MG/3ML
3 SOLUTION RESPIRATORY (INHALATION)
Status: COMPLETED | OUTPATIENT
Start: 2018-05-26 | End: 2018-05-26

## 2018-05-26 VITALS
TEMPERATURE: 98 F | OXYGEN SATURATION: 98 % | BODY MASS INDEX: 35.34 KG/M2 | SYSTOLIC BLOOD PRESSURE: 99 MMHG | DIASTOLIC BLOOD PRESSURE: 65 MMHG | WEIGHT: 180 LBS | HEART RATE: 55 BPM | RESPIRATION RATE: 22 BRPM | HEIGHT: 60 IN

## 2018-05-26 LAB
ALBUMIN SERPL BCP-MCNC: 4.3 G/DL
ALP SERPL-CCNC: 67 U/L
ALT SERPL W/O P-5'-P-CCNC: 240 U/L
ANION GAP SERPL CALC-SCNC: 11 MMOL/L
AST SERPL-CCNC: 162 U/L
BASOPHILS # BLD AUTO: 0.07 K/UL
BASOPHILS NFR BLD: 0.9 %
BILIRUB SERPL-MCNC: 2.8 MG/DL
BUN SERPL-MCNC: 14 MG/DL
CALCIUM SERPL-MCNC: 9 MG/DL
CHLORIDE SERPL-SCNC: 100 MMOL/L
CO2 SERPL-SCNC: 24 MMOL/L
CREAT SERPL-MCNC: 1 MG/DL
DIFFERENTIAL METHOD: ABNORMAL
EOSINOPHIL # BLD AUTO: 0.2 K/UL
EOSINOPHIL NFR BLD: 2.3 %
ERYTHROCYTE [DISTWIDTH] IN BLOOD BY AUTOMATED COUNT: 12.5 %
EST. GFR  (AFRICAN AMERICAN): >60 ML/MIN/1.73 M^2
EST. GFR  (NON AFRICAN AMERICAN): >60 ML/MIN/1.73 M^2
GLUCOSE SERPL-MCNC: 93 MG/DL
HCT VFR BLD AUTO: 37.6 %
HGB BLD-MCNC: 13.7 G/DL
IMM GRANULOCYTES # BLD AUTO: 0.03 K/UL
IMM GRANULOCYTES NFR BLD AUTO: 0.4 %
LYMPHOCYTES # BLD AUTO: 2.7 K/UL
LYMPHOCYTES NFR BLD: 33.6 %
MCH RBC QN AUTO: 31.9 PG
MCHC RBC AUTO-ENTMCNC: 36.4 G/DL
MCV RBC AUTO: 87 FL
MONOCYTES # BLD AUTO: 0.7 K/UL
MONOCYTES NFR BLD: 8.9 %
NEUTROPHILS # BLD AUTO: 4.4 K/UL
NEUTROPHILS NFR BLD: 53.9 %
NRBC BLD-RTO: 0 /100 WBC
PLATELET # BLD AUTO: 219 K/UL
PMV BLD AUTO: 11.1 FL
POTASSIUM SERPL-SCNC: 2.6 MMOL/L
POTASSIUM SERPL-SCNC: 3 MMOL/L
PROT SERPL-MCNC: 7.5 G/DL
RBC # BLD AUTO: 4.3 M/UL
SODIUM SERPL-SCNC: 135 MMOL/L
WBC # BLD AUTO: 8.16 K/UL

## 2018-05-26 PROCEDURE — 84132 ASSAY OF SERUM POTASSIUM: CPT | Mod: 91

## 2018-05-26 PROCEDURE — 80053 COMPREHEN METABOLIC PANEL: CPT

## 2018-05-26 PROCEDURE — 25000242 PHARM REV CODE 250 ALT 637 W/ HCPCS: Performed by: FAMILY MEDICINE

## 2018-05-26 PROCEDURE — 25000003 PHARM REV CODE 250: Performed by: FAMILY MEDICINE

## 2018-05-26 PROCEDURE — 94640 AIRWAY INHALATION TREATMENT: CPT

## 2018-05-26 RX ORDER — POTASSIUM CHLORIDE 20 MEQ/1
20 TABLET, EXTENDED RELEASE ORAL DAILY
Qty: 7 TABLET | Refills: 0 | Status: SHIPPED | OUTPATIENT
Start: 2018-05-26 | End: 2020-11-23

## 2018-05-26 RX ORDER — POTASSIUM CHLORIDE 20 MEQ/1
40 TABLET, EXTENDED RELEASE ORAL
Status: COMPLETED | OUTPATIENT
Start: 2018-05-26 | End: 2018-05-26

## 2018-05-26 RX ADMIN — POTASSIUM CHLORIDE 40 MEQ: 1500 TABLET, EXTENDED RELEASE ORAL at 02:05

## 2018-05-26 RX ADMIN — IPRATROPIUM BROMIDE AND ALBUTEROL SULFATE 3 ML: .5; 3 SOLUTION RESPIRATORY (INHALATION) at 12:05

## 2018-05-26 NOTE — ED PROVIDER NOTES
Encounter Date: 2018       History     Chief Complaint   Patient presents with    Cough     started 1 day ago     Pt with cough, shortness of breath, cramps for the past couple of weeks, states is much worse past couple of days. No fever or chills.           Review of patient's allergies indicates:   Allergen Reactions    Levaquin [levofloxacin] Swelling and Rash     Joint pain, abdominal pain     Past Medical History:   Diagnosis Date    Cancer     Thyroid    Hepatitis C 2016    History of PCOS     Hypoglycemia     Tachycardia     Thyroid disease     hypothyroid, pappilary carcinoma     Past Surgical History:   Procedure Laterality Date     SECTION      x3    HYSTERECTOMY      thyroid nodule biopsy      THYROIDECTOMY      10/17/13    TONSILLECTOMY, ADENOIDECTOMY      TUBAL LIGATION       Family History   Problem Relation Age of Onset    Breast cancer Maternal Grandmother     Ovarian cancer Maternal Grandmother     Cataracts Maternal Grandmother     Cancer Mother         VULVAR AND CERVICAL    Ovarian cancer Mother     Colon cancer Neg Hx      Social History   Substance Use Topics    Smoking status: Current Every Day Smoker     Packs/day: 1.00     Types: Cigarettes    Smokeless tobacco: Never Used    Alcohol use No     Review of Systems   Constitutional: Negative.    HENT: Negative.    Eyes: Negative.    Respiratory: Positive for cough and shortness of breath.    Cardiovascular: Negative.    Gastrointestinal: Negative.    Endocrine: Negative.    Genitourinary: Negative.    Musculoskeletal: Negative.    Allergic/Immunologic: Negative.    Neurological: Negative.    Hematological: Negative.    Psychiatric/Behavioral: Negative.        Physical Exam     Initial Vitals [18 2258]   BP Pulse Resp Temp SpO2   (!) 152/121 72 (!) 22 98 °F (36.7 °C) 100 %      MAP       131.33         Physical Exam    Nursing note and vitals reviewed.  Constitutional: She appears well-developed and  well-nourished. She is not diaphoretic. No distress.   HENT:   Head: Normocephalic and atraumatic.   Eyes: Conjunctivae and EOM are normal. Pupils are equal, round, and reactive to light.   Neck: Normal range of motion. Neck supple.   Cardiovascular: Normal rate, regular rhythm, normal heart sounds and intact distal pulses. Exam reveals no gallop and no friction rub.    No murmur heard.  Pulmonary/Chest: No respiratory distress. She has wheezes. She has rhonchi. She has no rales.   Scattered rhonchi and wheezes   Abdominal: Soft. Bowel sounds are normal. She exhibits no distension. There is no tenderness.   Musculoskeletal: Normal range of motion. She exhibits no edema.   Neurological: She is alert and oriented to person, place, and time. She has normal strength.   Skin: Skin is warm and dry. Capillary refill takes less than 2 seconds. No rash noted. No erythema.   Psychiatric: She has a normal mood and affect. Her behavior is normal. Judgment and thought content normal.         ED Course   Procedures  Labs Reviewed - No data to display                            ED Course as of May 26 0516   Sat May 26, 2018   0043 Chest xray shows no effusion or infiltrate.   [MD]      ED Course User Index  [MD] Janie Segovia MD     Clinical Impression:   The encounter diagnosis was Hypokalemia.                           Janie Segovia MD  05/26/18 0576

## 2018-05-26 NOTE — ED TRIAGE NOTES
Cough started 1 day ago. Also states cramping in legs and hands. Patients hand cramped when blood pressure cuff inflated. Dr. Segovia notified.

## 2018-05-26 NOTE — DISCHARGE INSTRUCTIONS
Please follow up with your doctor on Tuesday of next week for a repeat potassium level. Return to the ER if your symptoms return or change or worsen.

## 2018-06-30 ENCOUNTER — HOSPITAL ENCOUNTER (EMERGENCY)
Facility: HOSPITAL | Age: 33
Discharge: HOME | End: 2018-06-30
Attending: FAMILY MEDICINE
Payer: MEDICAID

## 2018-06-30 VITALS
SYSTOLIC BLOOD PRESSURE: 116 MMHG | RESPIRATION RATE: 20 BRPM | WEIGHT: 170 LBS | BODY MASS INDEX: 32.1 KG/M2 | TEMPERATURE: 98 F | HEART RATE: 98 BPM | DIASTOLIC BLOOD PRESSURE: 91 MMHG | HEIGHT: 61 IN | OXYGEN SATURATION: 97 %

## 2018-06-30 DIAGNOSIS — N30.90 CYSTITIS: Primary | ICD-10-CM

## 2018-06-30 LAB
BACTERIA #/AREA URNS HPF: ABNORMAL /HPF
BILIRUB UR QL STRIP: NEGATIVE
CLARITY UR: ABNORMAL
COLOR UR: ABNORMAL
GLUCOSE UR QL STRIP: NEGATIVE
HGB UR QL STRIP: ABNORMAL
HYALINE CASTS #/AREA URNS LPF: 0 /LPF
KETONES UR QL STRIP: NEGATIVE
LEUKOCYTE ESTERASE UR QL STRIP: ABNORMAL
MICROSCOPIC COMMENT: ABNORMAL
NITRITE UR QL STRIP: POSITIVE
PH UR STRIP: 6 [PH] (ref 5–8)
PROT UR QL STRIP: ABNORMAL
RBC #/AREA URNS HPF: >100 /HPF (ref 0–4)
SP GR UR STRIP: >=1.03 (ref 1–1.03)
URN SPEC COLLECT METH UR: ABNORMAL
UROBILINOGEN UR STRIP-ACNC: NEGATIVE EU/DL
WBC #/AREA URNS HPF: 40 /HPF (ref 0–5)

## 2018-06-30 PROCEDURE — 81000 URINALYSIS NONAUTO W/SCOPE: CPT

## 2018-06-30 PROCEDURE — 25000003 PHARM REV CODE 250: Performed by: NURSE PRACTITIONER

## 2018-06-30 PROCEDURE — 87086 URINE CULTURE/COLONY COUNT: CPT

## 2018-06-30 PROCEDURE — 87077 CULTURE AEROBIC IDENTIFY: CPT

## 2018-06-30 PROCEDURE — 87186 SC STD MICRODIL/AGAR DIL: CPT

## 2018-06-30 PROCEDURE — 99283 EMERGENCY DEPT VISIT LOW MDM: CPT

## 2018-06-30 PROCEDURE — 87088 URINE BACTERIA CULTURE: CPT

## 2018-06-30 RX ORDER — PHENAZOPYRIDINE HYDROCHLORIDE 200 MG/1
200 TABLET, FILM COATED ORAL 3 TIMES DAILY
Qty: 6 TABLET | Refills: 0 | Status: SHIPPED | OUTPATIENT
Start: 2018-06-30 | End: 2018-07-10 | Stop reason: SDUPTHER

## 2018-06-30 RX ORDER — PHENAZOPYRIDINE HYDROCHLORIDE 100 MG/1
200 TABLET, FILM COATED ORAL
Status: COMPLETED | OUTPATIENT
Start: 2018-06-30 | End: 2018-06-30

## 2018-06-30 RX ORDER — NITROFURANTOIN 25; 75 MG/1; MG/1
100 CAPSULE ORAL
Status: COMPLETED | OUTPATIENT
Start: 2018-06-30 | End: 2018-06-30

## 2018-06-30 RX ORDER — IBUPROFEN 400 MG/1
800 TABLET ORAL
Status: COMPLETED | OUTPATIENT
Start: 2018-06-30 | End: 2018-06-30

## 2018-06-30 RX ORDER — QUETIAPINE FUMARATE 200 MG/1
TABLET, FILM COATED ORAL
COMMUNITY
End: 2020-11-23

## 2018-06-30 RX ORDER — NITROFURANTOIN 25; 75 MG/1; MG/1
100 CAPSULE ORAL 2 TIMES DAILY
Qty: 10 CAPSULE | Refills: 0 | Status: SHIPPED | OUTPATIENT
Start: 2018-06-30 | End: 2018-07-05

## 2018-06-30 RX ADMIN — NITROFURANTOIN (MONOHYDRATE/MACROCRYSTALS) 100 MG: 75; 25 CAPSULE ORAL at 05:06

## 2018-06-30 RX ADMIN — PHENAZOPYRIDINE 200 MG: 100 TABLET ORAL at 05:06

## 2018-06-30 RX ADMIN — IBUPROFEN 800 MG: 400 TABLET ORAL at 05:06

## 2018-06-30 NOTE — DISCHARGE INSTRUCTIONS
Increase fluids, acidify urine as discussed, return to ED or see your PMD for worsening or if no better in three days

## 2018-07-02 LAB — BACTERIA UR CULT: NORMAL

## 2018-07-10 ENCOUNTER — HOSPITAL ENCOUNTER (EMERGENCY)
Facility: HOSPITAL | Age: 33
Discharge: HOME | End: 2018-07-10
Payer: MEDICAID

## 2018-07-10 VITALS
BODY MASS INDEX: 30.21 KG/M2 | OXYGEN SATURATION: 98 % | WEIGHT: 160 LBS | TEMPERATURE: 98 F | HEIGHT: 61 IN | DIASTOLIC BLOOD PRESSURE: 83 MMHG | HEART RATE: 89 BPM | SYSTOLIC BLOOD PRESSURE: 125 MMHG | RESPIRATION RATE: 16 BRPM

## 2018-07-10 DIAGNOSIS — R31.9 URINARY TRACT INFECTION WITH HEMATURIA, SITE UNSPECIFIED: Primary | ICD-10-CM

## 2018-07-10 DIAGNOSIS — N39.0 URINARY TRACT INFECTION WITH HEMATURIA, SITE UNSPECIFIED: Primary | ICD-10-CM

## 2018-07-10 LAB
AMORPH CRY URNS QL MICRO: ABNORMAL
BACTERIA #/AREA URNS HPF: ABNORMAL /HPF
BILIRUB UR QL STRIP: ABNORMAL
CLARITY UR: ABNORMAL
COLOR UR: YELLOW
GLUCOSE UR QL STRIP: NEGATIVE
HGB UR QL STRIP: ABNORMAL
HYALINE CASTS #/AREA URNS LPF: 0 /LPF
KETONES UR QL STRIP: ABNORMAL
LEUKOCYTE ESTERASE UR QL STRIP: ABNORMAL
MICROSCOPIC COMMENT: ABNORMAL
NITRITE UR QL STRIP: NEGATIVE
PH UR STRIP: 6 [PH] (ref 5–8)
PROT UR QL STRIP: ABNORMAL
RBC #/AREA URNS HPF: 10 /HPF (ref 0–4)
SP GR UR STRIP: >=1.03 (ref 1–1.03)
SQUAMOUS #/AREA URNS HPF: 5 /HPF
URN SPEC COLLECT METH UR: ABNORMAL
UROBILINOGEN UR STRIP-ACNC: ABNORMAL EU/DL
WBC #/AREA URNS HPF: 100 /HPF (ref 0–5)

## 2018-07-10 PROCEDURE — 81000 URINALYSIS NONAUTO W/SCOPE: CPT

## 2018-07-10 PROCEDURE — 99283 EMERGENCY DEPT VISIT LOW MDM: CPT | Mod: 25

## 2018-07-10 PROCEDURE — 63600175 PHARM REV CODE 636 W HCPCS: Performed by: NURSE PRACTITIONER

## 2018-07-10 PROCEDURE — 87086 URINE CULTURE/COLONY COUNT: CPT

## 2018-07-10 PROCEDURE — 96372 THER/PROPH/DIAG INJ SC/IM: CPT

## 2018-07-10 RX ORDER — NITROFURANTOIN 25; 75 MG/1; MG/1
100 CAPSULE ORAL 2 TIMES DAILY
Qty: 14 CAPSULE | Refills: 0 | Status: SHIPPED | OUTPATIENT
Start: 2018-07-10 | End: 2018-07-17

## 2018-07-10 RX ORDER — PHENAZOPYRIDINE HYDROCHLORIDE 200 MG/1
200 TABLET, FILM COATED ORAL 3 TIMES DAILY
Qty: 6 TABLET | Refills: 0 | Status: SHIPPED | OUTPATIENT
Start: 2018-07-10 | End: 2018-07-12

## 2018-07-10 RX ORDER — CEFTRIAXONE 1 G/1
1 INJECTION, POWDER, FOR SOLUTION INTRAMUSCULAR; INTRAVENOUS
Status: COMPLETED | OUTPATIENT
Start: 2018-07-10 | End: 2018-07-10

## 2018-07-10 RX ADMIN — CEFTRIAXONE SODIUM 1 G: 1 INJECTION, POWDER, FOR SOLUTION INTRAMUSCULAR; INTRAVENOUS at 08:07

## 2018-07-11 NOTE — ED PROVIDER NOTES
Encounter Date: 7/10/2018       History     Chief Complaint   Patient presents with    Urinary Tract Infection     Patient to ER for urinary discomfort, burning. Reports ongoing for a couple of weeks now. Was seen end of , diagnosed with UTI and was taking medication but lost bottle before finishing meds, no symptoms are back.           Review of patient's allergies indicates:   Allergen Reactions    Levaquin [levofloxacin] Swelling and Rash     Joint pain, abdominal pain     Past Medical History:   Diagnosis Date    Cancer     Thyroid    Hepatitis C 2016    History of PCOS     Hypoglycemia     Tachycardia     Thyroid disease     hypothyroid, pappilary carcinoma     Past Surgical History:   Procedure Laterality Date     SECTION      x3    HYSTERECTOMY      thyroid nodule biopsy      THYROIDECTOMY      10/17/13    TONSILLECTOMY, ADENOIDECTOMY      TUBAL LIGATION       Family History   Problem Relation Age of Onset    Breast cancer Maternal Grandmother     Ovarian cancer Maternal Grandmother     Cataracts Maternal Grandmother     Cancer Mother         VULVAR AND CERVICAL    Ovarian cancer Mother     Colon cancer Neg Hx      Social History   Substance Use Topics    Smoking status: Current Every Day Smoker     Packs/day: 1.00     Types: Cigarettes    Smokeless tobacco: Never Used    Alcohol use No     Review of Systems   Constitutional: Negative.    HENT: Negative.    Respiratory: Negative.    Cardiovascular: Negative.    Gastrointestinal: Negative.    Genitourinary: Positive for dysuria, frequency and pelvic pain.   Musculoskeletal: Negative.    Neurological: Negative.    Psychiatric/Behavioral: Negative.        Physical Exam     Initial Vitals [07/10/18 2010]   BP Pulse Resp Temp SpO2   125/83 89 16 98.1 °F (36.7 °C) 98 %      MAP       --         Physical Exam    Nursing note and vitals reviewed.  Constitutional: She appears well-developed and well-nourished.   HENT:   Head:  Normocephalic.   Cardiovascular: Normal rate, regular rhythm and normal heart sounds.   Pulmonary/Chest: Breath sounds normal.   Abdominal: Soft. She exhibits no distension and no mass. There is tenderness (pelvic). There is no rebound and no guarding.   Musculoskeletal: Normal range of motion.   Neurological: She is alert and oriented to person, place, and time. She has normal strength.   Psychiatric: She has a normal mood and affect. Her behavior is normal. Judgment and thought content normal.         ED Course   Procedures  Labs Reviewed - No data to display       Imaging Results    None                               Clinical Impression:   The encounter diagnosis was Urinary tract infection with hematuria, site unspecified.                             MAYDA Ramsey  07/18/18 2277

## 2018-07-11 NOTE — ED TRIAGE NOTES
I was recently diagnosed with a UTI about 9 days ago. I lost my prescription 2-3 days before I finished them. Frequent urination and dysuria

## 2018-07-12 LAB — BACTERIA UR CULT: NORMAL

## 2018-12-13 ENCOUNTER — OFFICE VISIT (OUTPATIENT)
Dept: URGENT CARE | Facility: CLINIC | Age: 33
End: 2018-12-13
Payer: MEDICAID

## 2018-12-13 VITALS
BODY MASS INDEX: 31.83 KG/M2 | OXYGEN SATURATION: 98 % | HEART RATE: 112 BPM | HEIGHT: 62 IN | SYSTOLIC BLOOD PRESSURE: 121 MMHG | TEMPERATURE: 98 F | WEIGHT: 173 LBS | DIASTOLIC BLOOD PRESSURE: 89 MMHG | RESPIRATION RATE: 16 BRPM

## 2018-12-13 DIAGNOSIS — Z76.0 MEDICATION REFILL: ICD-10-CM

## 2018-12-13 DIAGNOSIS — K02.9 DENTAL CARIES: Primary | ICD-10-CM

## 2018-12-13 DIAGNOSIS — F41.9 ANXIETY: ICD-10-CM

## 2018-12-13 PROCEDURE — 99204 OFFICE O/P NEW MOD 45 MIN: CPT | Mod: S$GLB,,, | Performed by: NURSE PRACTITIONER

## 2018-12-13 RX ORDER — LEVOTHYROXINE SODIUM 175 UG/1
175 TABLET ORAL DAILY
Qty: 30 TABLET | Refills: 0 | Status: SHIPPED | OUTPATIENT
Start: 2018-12-13 | End: 2019-11-11

## 2018-12-13 RX ORDER — ALPRAZOLAM 1 MG/1
1 TABLET ORAL NIGHTLY PRN
Qty: 30 TABLET | Refills: 0 | Status: SHIPPED | OUTPATIENT
Start: 2018-12-13 | End: 2020-11-23

## 2018-12-13 RX ORDER — CHLORHEXIDINE GLUCONATE ORAL RINSE 1.2 MG/ML
15 SOLUTION DENTAL 2 TIMES DAILY
Qty: 473 ML | Refills: 1 | Status: SHIPPED | OUTPATIENT
Start: 2018-12-13 | End: 2018-12-27

## 2018-12-13 RX ORDER — AMOXICILLIN 875 MG/1
875 TABLET, FILM COATED ORAL 2 TIMES DAILY
Qty: 20 TABLET | Refills: 0 | Status: SHIPPED | OUTPATIENT
Start: 2018-12-13 | End: 2018-12-23

## 2018-12-13 NOTE — PROGRESS NOTES
"Subjective:       Patient ID: Corina Liu is a 33 y.o. female.    Vitals:  height is 5' 1.75" (1.568 m) and weight is 78.5 kg (173 lb). Her oral temperature is 97.5 °F (36.4 °C). Her blood pressure is 121/89 and her pulse is 112 (abnormal). Her respiration is 16 and oxygen saturation is 98%.     Chief Complaint: Fatigue    Pt presents with multiple complaints. Pt has been out of her synthroid for 1 month. She has a PCP but lost her medicaid and recently got insurance again. She also c/o dental pain and severe dental caries with worsening pain and edema to her gums for the past 3 weeks. She denies f/c/n/v. She is also requesting refill on xanax for anxiety and would like GI referral for her chronic hep C. She denies cp/sob and abd pain.       Fatigue   This is a chronic problem. Associated symptoms include fatigue. Pertinent negatives include no arthralgias, chest pain, chills, congestion, coughing, fever, headaches, joint swelling, myalgias, nausea, rash, sore throat, vertigo, vomiting or weakness. Associated symptoms comments: Weakness, gum soreness( top), diarrhea , sour smelling burping, muscle cramping in the hands and wrist , while swollowing the hands cramp up . The treatment provided no relief.       Constitution: Positive for fatigue. Negative for chills and fever.   HENT: Positive for dental problem. Negative for congestion and sore throat.    Neck: Negative for painful lymph nodes.   Cardiovascular: Negative for chest pain and leg swelling.   Eyes: Negative for double vision and blurred vision.   Respiratory: Negative for cough and shortness of breath.    Gastrointestinal: Negative for nausea, vomiting and diarrhea.   Genitourinary: Negative for dysuria, frequency, urgency and history of kidney stones.   Musculoskeletal: Negative for joint pain, joint swelling, muscle cramps and muscle ache.   Skin: Negative for color change, pale, rash and bruising.   Allergic/Immunologic: Negative for seasonal " allergies.   Neurological: Negative for dizziness, history of vertigo, light-headedness, passing out and headaches.   Hematologic/Lymphatic: Negative for swollen lymph nodes.   Psychiatric/Behavioral: Positive for nervous/anxious and sleep disturbance. Negative for homicidal ideas, suicidal ideas, self-injury, substance abuse and depression. The patient is nervous/anxious.        Objective:      Physical Exam   Constitutional: She is oriented to person, place, and time. She appears well-developed and well-nourished. She is cooperative.  Non-toxic appearance. She does not appear ill. No distress.   HENT:   Head: Normocephalic and atraumatic.   Right Ear: Hearing, tympanic membrane, external ear and ear canal normal.   Left Ear: Hearing, tympanic membrane, external ear and ear canal normal.   Nose: Nose normal. No mucosal edema, rhinorrhea or nasal deformity. No epistaxis. Right sinus exhibits no maxillary sinus tenderness and no frontal sinus tenderness. Left sinus exhibits no maxillary sinus tenderness and no frontal sinus tenderness.   Mouth/Throat: Uvula is midline, oropharynx is clear and moist and mucous membranes are normal. No trismus in the jaw. Normal dentition. Dental caries present. No uvula swelling. No posterior oropharyngeal erythema.   Mod edema and erythema to upper and lower gums with severe dental caries and poor dentition.    Eyes: Conjunctivae and lids are normal. Right eye exhibits no discharge. Left eye exhibits no discharge. No scleral icterus.   Sclera clear bilat   Neck: Trachea normal, normal range of motion, full passive range of motion without pain and phonation normal. Neck supple.   Cardiovascular: Normal rate, regular rhythm, normal heart sounds, intact distal pulses and normal pulses.   Pulmonary/Chest: Effort normal and breath sounds normal. No respiratory distress.   Abdominal: Soft. Normal appearance and bowel sounds are normal. She exhibits no distension, no pulsatile midline mass  and no mass. There is no tenderness.   Musculoskeletal: Normal range of motion. She exhibits no edema or deformity.   Neurological: She is alert and oriented to person, place, and time. She exhibits normal muscle tone. Coordination normal.   Skin: Skin is warm, dry and intact. She is not diaphoretic. No pallor.   Psychiatric: She has a normal mood and affect. Her speech is normal and behavior is normal. Judgment and thought content normal. Cognition and memory are normal.   Nursing note and vitals reviewed.      Assessment:       1. Dental caries    2. Medication refill    3. Anxiety        Plan:         Dental caries    Medication refill    Anxiety    Other orders  -     amoxicillin (AMOXIL) 875 MG tablet; Take 1 tablet (875 mg total) by mouth 2 (two) times daily. for 10 days  Dispense: 20 tablet; Refill: 0  -     chlorhexidine (PERIDEX) 0.12 % solution; Use as directed 15 mLs in the mouth or throat 2 (two) times daily. for 14 days  Dispense: 473 mL; Refill: 1  -     ALPRAZolam (XANAX) 1 MG tablet; Take 1 tablet (1 mg total) by mouth nightly as needed for Anxiety.  Dispense: 30 tablet; Refill: 0  -     levothyroxine (SYNTHROID, LEVOTHROID) 175 MCG tablet; Take 1 tablet (175 mcg total) by mouth once daily.  Dispense: 30 tablet; Refill: 0    I had a detailed discussion with pt regarding need to see PCP for further management of chronic problems. Pt is attempting to get appt with Dr. Carlson or new PCP. I provided pt with access community health paperwork for close follow up. Pt also advised to see dentist for management of dental caries and gum disease.

## 2018-12-13 NOTE — PATIENT INSTRUCTIONS
Understanding Anxiety Disorders  Almost everyone gets nervous now and then. Its normal to have knots in your stomach before a test, or for your heart to race on a first date. But an anxiety disorder is much more than a case of nerves. In fact, its symptoms may be overwhelming. But treatment can relieve many of these symptoms. Talking to your healthcare provider is the first step.    What are anxiety disorders?  An anxiety disorder causes intense feelings of panic and fear. These feelings may arise for no apparent reason. And they tend to recur again and again. They may prevent you from coping with life and cause you great distress. As a result, you may avoid anything that triggers your fear. In extreme cases, you may never leave the house. Anxiety disorders may cause other symptoms, such as:  · Obsessive thoughts you cant control  · Constant nightmares or painful thoughts of the past  · Nausea, sweating, and muscle tension  · Trouble sleeping or concentrating  What causes anxiety disorders?  Anxiety disorders tend to run in families. For some people, childhood abuse or neglect may play a role. For others, stressful life events or trauma may trigger anxiety disorders. Anxiety can trigger low self-esteem and poor coping skills.  Common anxiety disorders  · Panic disorder. This causes an intense fear of being in danger.  · Phobias. These are extreme fears of certain objects, places, or events.  · Obsessive-compulsive disorder. This causes you to have unwanted thoughts and urges. You also may perform certain actions over and over.  · Posttraumatic stress disorder. This occurs in people who have survived a terrible ordeal. It can cause nightmares and flashbacks about the event.  · Generalized anxiety disorder. This causes constant worry that can greatly disrupt your life.   Getting better  You may believe that nothing can help you. Or, you might fear what others may think. But most anxiety symptoms can be eased.  Having an anxiety disorder is nothing to be ashamed of. Most people do best with treatment that combines medicine and therapy. These arent cures. But they can help you live a healthier life.  Date Last Reviewed: 2/1/2017 © 2000-2017 [employer]. [address] [place] [address], [place] [address]. All rights reserved. This information is not intended as a substitute for professional medical care. Always follow your healthcare professional's instructions.        Understanding Tooth Decay  Plaque is a sticky coating of bacteria and other substances that forms on your teeth and gums. It can cause 2 serious problems: tooth decay and gum disease. These problems damage the teeth and gums, and may even lead to tooth loss. When the mouth is well cared for, tooth decay and gum disease can be reversed in their early stages. Better yet, you can prevent these problems from starting by brushing and flossing daily and avoiding between meal snacking on foods high in sugar and starch.     Once tooth decay eats through the enamel, it spreads quickly through the softer dentin.       After tooth decay is removed, the hole is filled with a hard material.      How tooth decay develops  Tooth decay happens when bacteria in plaque make acids that eat away at the tooth. Cavities (also called caries) are holes that form in the teeth. They are most common in places that are hard to reach with a toothbrush. This includes the grooves at the tops of the back teeth, and on the sides where the teeth touch. In late stages, tooth decay can be painful. It can also lead to tooth loss.  Treating tooth decay  Tooth decay can be treated to keep it from moving farther into the tooth. This is often done by filling cavities. First, any tooth decay is removed. This protects the tooth from more damage. Then, the cavity is filled with a hard material. This filling protects the damaged tooth and restores the tooth's surface. If the tooth is severely  damaged by decay, other treatments are available.  Follow-up visits  Visit your dental team at least every 6 months for a checkup and cleaning. If youre being treated for tooth decay or gum disease, you may need more frequent visits. These visits will likely decrease as your mouth care efforts start to pay off. Keep flossing and brushing, and maintain a healthy diet. Follow any special instructions your dentist or dental hygienist gives you. And enjoy flashing your healthy smile!  Date Last Reviewed: 6/30/2015 © 2000-2017 [employer]. [address] [place] [id number]. All rights reserved. This information is not intended as a substitute for professional medical care. Always follow your healthcare professional's instructions.        Common Thyroid Problems    The thyroid is a gland in the neck. It makes thyroid hormone. A hormone is a chemical messenger for the body. If there is a problem with the thyroid, the level of thyroid hormone may change. This can lead to symptoms.  Hypothyroidism  This is when the thyroid gland doesnt make enough hormone. The most common cause of hypothyroidism is Hashimoto thyroiditis. This occurs when the bodys immune system attacks the thyroid gland. Hypothyroidism may also occur if theres a severe deficiency of iodine in the body. The thyroid needs iodine to make hormone. Problems with the pituitary gland can lead to not enough production of thyroid hormone. Hypothyroidism will also occur if the thyroid gland is removed during surgery.  Common symptoms include:  · Low energy and tiredness  · Depression  · Feeling cold  · Muscle pain  · Slowed thinking      · Constipation  · Heavier menstrual periods with prolonged bleeding   · Weight gain  · Dry and brittle skin, hair, nails  · Excessive sleepiness  Hyperthyroidism  This is when the thyroid gland makes too much hormone. The most common cause is Graves disease. This is due to the bodys immune system telling  the thyroid to make too much hormone. Another cause is a small bump (nodule) in the thyroid gland. This can cause hyperthyroidism if the cells in the nodule make too much thyroid hormone and stop hormone production in the rest of the thyroid gland.  Common symptoms include:  · Shaking, nervousness, irritability  · Feeling hot  · A rapid, irregular heartbeat  · Muscle weakness, fatigue  · More frequent bowel movements  · [place], lighter menstrual periods  · Weight loss  · Hair loss  · Bulging eyes  Nodules  Nodules are lumps of tissue in the thyroid gland. Most often, the cause of nodules isnt known. But they may be more common in people whove had medical radiation to the head or neck. Sometimes nodules can be felt on the outside of the neck. Most of the time, cause no symptoms and dont affect the amount of thyroid hormone. Most nodules are not cancer. But sometimes a nodule may be cancer.  What is a goiter?  A goiter is the enlargement of the thyroid gland. When the gland enlarges, you may see or feel a swelling on your neck. A goiter can develop in someone with a normal thyroid, overactive thyroid, or underactive thyroid.   Date Last Reviewed: 11/1/2016  © 3635-9021 [employer]. [address] [place] [address], [place] [address]. All rights reserved. This information is not intended as a substitute for professional medical care. Always follow your healthcare professional's instructions.

## 2019-02-07 ENCOUNTER — HOSPITAL ENCOUNTER (EMERGENCY)
Facility: HOSPITAL | Age: 34
Discharge: HOME | End: 2019-02-07
Attending: EMERGENCY MEDICINE
Payer: MEDICAID

## 2019-02-07 VITALS
HEIGHT: 61 IN | TEMPERATURE: 99 F | HEART RATE: 108 BPM | SYSTOLIC BLOOD PRESSURE: 136 MMHG | WEIGHT: 154.75 LBS | DIASTOLIC BLOOD PRESSURE: 90 MMHG | BODY MASS INDEX: 29.22 KG/M2 | OXYGEN SATURATION: 98 % | RESPIRATION RATE: 20 BRPM

## 2019-02-07 DIAGNOSIS — S20.212A RIB CONTUSION, LEFT, INITIAL ENCOUNTER: ICD-10-CM
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
## 2019-02-07 DIAGNOSIS — S80.01XA CONTUSION OF RIGHT KNEE, INITIAL ENCOUNTER: ICD-10-CM

## 2019-02-07 DIAGNOSIS — S80.02XA CONTUSION OF LEFT KNEE, INITIAL ENCOUNTER: ICD-10-CM

## 2019-02-07 DIAGNOSIS — S16.1XXA ACUTE STRAIN OF NECK MUSCLE, INITIAL ENCOUNTER: ICD-10-CM

## 2019-02-07 PROCEDURE — 96372 THER/PROPH/DIAG INJ SC/IM: CPT

## 2019-02-07 PROCEDURE — 63600175 PHARM REV CODE 636 W HCPCS: Performed by: PHYSICIAN ASSISTANT

## 2019-02-07 PROCEDURE — 25000003 PHARM REV CODE 250: Performed by: PHYSICIAN ASSISTANT

## 2019-02-07 PROCEDURE — 99284 EMERGENCY DEPT VISIT MOD MDM: CPT | Mod: 25

## 2019-02-07 RX ORDER — LIDOCAINE 50 MG/G
1 PATCH TOPICAL
Status: DISCONTINUED | OUTPATIENT
Start: 2019-02-07 | End: 2019-02-07 | Stop reason: HOSPADM

## 2019-02-07 RX ORDER — LIDOCAINE 50 MG/G
1 PATCH TOPICAL DAILY
Qty: 30 PATCH | Refills: 0 | Status: SHIPPED | OUTPATIENT
Start: 2019-02-07 | End: 2020-11-23

## 2019-02-07 RX ORDER — ORPHENADRINE CITRATE 100 MG/1
100 TABLET, EXTENDED RELEASE ORAL ONCE
Status: COMPLETED | OUTPATIENT
Start: 2019-02-07 | End: 2019-02-07

## 2019-02-07 RX ORDER — CYCLOBENZAPRINE HCL 10 MG
10 TABLET ORAL 3 TIMES DAILY PRN
Qty: 12 TABLET | Refills: 0 | Status: SHIPPED | OUTPATIENT
Start: 2019-02-07 | End: 2019-02-11

## 2019-02-07 RX ORDER — KETOROLAC TROMETHAMINE 30 MG/ML
30 INJECTION, SOLUTION INTRAMUSCULAR; INTRAVENOUS
Status: COMPLETED | OUTPATIENT
Start: 2019-02-07 | End: 2019-02-07

## 2019-02-07 RX ORDER — DICLOFENAC SODIUM 75 MG/1
75 TABLET, DELAYED RELEASE ORAL 2 TIMES DAILY PRN
Qty: 30 TABLET | Refills: 0 | Status: SHIPPED | OUTPATIENT
Start: 2019-02-07 | End: 2019-11-23

## 2019-02-07 RX ADMIN — ORPHENADRINE CITRATE 100 MG: 100 TABLET, EXTENDED RELEASE ORAL at 11:02

## 2019-02-07 RX ADMIN — KETOROLAC TROMETHAMINE 30 MG: 30 INJECTION, SOLUTION INTRAMUSCULAR at 09:02

## 2019-02-07 RX ADMIN — LIDOCAINE 1 PATCH: 50 PATCH TOPICAL at 11:02
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
## 2019-03-10 ENCOUNTER — HOSPITAL ENCOUNTER (INPATIENT)
Facility: HOSPITAL | Age: 34
LOS: 3 days | Discharge: HOME | DRG: 872 | End: 2019-03-13
Attending: EMERGENCY MEDICINE | Admitting: HOSPITALIST
Payer: MEDICAID

## 2019-03-10 DIAGNOSIS — A41.9 SEPSIS: ICD-10-CM

## 2019-03-10 DIAGNOSIS — R11.2 NAUSEA AND VOMITING, INTRACTABILITY OF VOMITING NOT SPECIFIED, UNSPECIFIED VOMITING TYPE: ICD-10-CM

## 2019-03-10 DIAGNOSIS — J44.1 COPD WITH EXACERBATION: ICD-10-CM

## 2019-03-10 DIAGNOSIS — R05.9 COUGH: ICD-10-CM

## 2019-03-10 DIAGNOSIS — N12 PYELONEPHRITIS: Primary | ICD-10-CM

## 2019-03-10 PROBLEM — E05.90 HYPERTHYROIDISM: Status: ACTIVE | Noted: 2019-03-10

## 2019-03-10 PROBLEM — J44.9 COPD (CHRONIC OBSTRUCTIVE PULMONARY DISEASE): Status: ACTIVE | Noted: 2019-03-10

## 2019-03-10 PROBLEM — F17.200 NICOTINE DEPENDENCE: Status: ACTIVE | Noted: 2019-03-10

## 2019-03-10 PROBLEM — F19.90 IV DRUG USER: Status: ACTIVE | Noted: 2019-03-10

## 2019-03-10 PROBLEM — E03.9 HYPOTHYROIDISM: Status: ACTIVE | Noted: 2019-03-10

## 2019-03-10 PROBLEM — F41.9 ANXIETY DISORDER: Status: ACTIVE | Noted: 2019-03-10

## 2019-03-10 PROBLEM — E07.9 THYROID DISEASE: Status: ACTIVE | Noted: 2019-03-10

## 2019-03-10 LAB
ALBUMIN SERPL BCP-MCNC: 3.9 G/DL
ALP SERPL-CCNC: 68 U/L
ALT SERPL W/O P-5'-P-CCNC: 14 U/L
ANION GAP SERPL CALC-SCNC: 15 MMOL/L
AST SERPL-CCNC: 26 U/L
BACTERIA #/AREA URNS HPF: ABNORMAL /HPF
BASOPHILS # BLD AUTO: 0 K/UL
BASOPHILS NFR BLD: 0.2 %
BILIRUB SERPL-MCNC: 0.9 MG/DL
BILIRUB UR QL STRIP: ABNORMAL
BUN SERPL-MCNC: 7 MG/DL
CALCIUM SERPL-MCNC: 9.5 MG/DL
CHLORIDE SERPL-SCNC: 102 MMOL/L
CLARITY UR: ABNORMAL
CO2 SERPL-SCNC: 20 MMOL/L
COLOR UR: YELLOW
CREAT SERPL-MCNC: 1.1 MG/DL
DIFFERENTIAL METHOD: ABNORMAL
EOSINOPHIL # BLD AUTO: 0.1 K/UL
EOSINOPHIL NFR BLD: 0.7 %
ERYTHROCYTE [DISTWIDTH] IN BLOOD BY AUTOMATED COUNT: 12.8 %
EST. GFR  (AFRICAN AMERICAN): >60 ML/MIN/1.73 M^2
EST. GFR  (NON AFRICAN AMERICAN): >60 ML/MIN/1.73 M^2
GLUCOSE SERPL-MCNC: 97 MG/DL
GLUCOSE UR QL STRIP: NEGATIVE
HCT VFR BLD AUTO: 37.7 %
HGB BLD-MCNC: 12.9 G/DL
HGB UR QL STRIP: NEGATIVE
HYALINE CASTS #/AREA URNS LPF: 0 /LPF
INFLUENZA A, MOLECULAR: NEGATIVE
INFLUENZA B, MOLECULAR: NEGATIVE
KETONES UR QL STRIP: ABNORMAL
LACTATE SERPL-SCNC: 0.7 MMOL/L
LEUKOCYTE ESTERASE UR QL STRIP: ABNORMAL
LYMPHOCYTES # BLD AUTO: 2.9 K/UL
LYMPHOCYTES NFR BLD: 16.1 %
MCH RBC QN AUTO: 30.5 PG
MCHC RBC AUTO-ENTMCNC: 34.1 G/DL
MCV RBC AUTO: 89 FL
MICROSCOPIC COMMENT: ABNORMAL
MONOCYTES # BLD AUTO: 0.7 K/UL
MONOCYTES NFR BLD: 3.8 %
NEUTROPHILS # BLD AUTO: 14.1 K/UL
NEUTROPHILS NFR BLD: 79.2 %
NITRITE UR QL STRIP: POSITIVE
PH UR STRIP: 6 [PH] (ref 5–8)
PLATELET # BLD AUTO: 218 K/UL
PMV BLD AUTO: 9.7 FL
POTASSIUM SERPL-SCNC: 4.2 MMOL/L
PROT SERPL-MCNC: 7.5 G/DL
PROT UR QL STRIP: ABNORMAL
RBC # BLD AUTO: 4.22 M/UL
RBC #/AREA URNS HPF: 0 /HPF (ref 0–4)
SODIUM SERPL-SCNC: 137 MMOL/L
SP GR UR STRIP: 1.02 (ref 1–1.03)
SPECIMEN SOURCE: NORMAL
SQUAMOUS #/AREA URNS HPF: 1 /HPF
T4 FREE SERPL-MCNC: 0.61 NG/DL
TSH SERPL DL<=0.005 MIU/L-ACNC: 7.39 UIU/ML
URN SPEC COLLECT METH UR: ABNORMAL
UROBILINOGEN UR STRIP-ACNC: 1 EU/DL
WBC # BLD AUTO: 17.8 K/UL
WBC #/AREA URNS HPF: >100 /HPF (ref 0–5)

## 2019-03-10 PROCEDURE — 84439 ASSAY OF FREE THYROXINE: CPT

## 2019-03-10 PROCEDURE — 12000002 HC ACUTE/MED SURGE SEMI-PRIVATE ROOM

## 2019-03-10 PROCEDURE — 87186 SC STD MICRODIL/AGAR DIL: CPT

## 2019-03-10 PROCEDURE — 96366 THER/PROPH/DIAG IV INF ADDON: CPT

## 2019-03-10 PROCEDURE — 99285 EMERGENCY DEPT VISIT HI MDM: CPT | Mod: 25

## 2019-03-10 PROCEDURE — 87077 CULTURE AEROBIC IDENTIFY: CPT

## 2019-03-10 PROCEDURE — 25000242 PHARM REV CODE 250 ALT 637 W/ HCPCS: Performed by: INTERNAL MEDICINE

## 2019-03-10 PROCEDURE — 87502 INFLUENZA DNA AMP PROBE: CPT

## 2019-03-10 PROCEDURE — 63600175 PHARM REV CODE 636 W HCPCS: Performed by: INTERNAL MEDICINE

## 2019-03-10 PROCEDURE — 96361 HYDRATE IV INFUSION ADD-ON: CPT | Performed by: EMERGENCY MEDICINE

## 2019-03-10 PROCEDURE — 81000 URINALYSIS NONAUTO W/SCOPE: CPT

## 2019-03-10 PROCEDURE — 96375 TX/PRO/DX INJ NEW DRUG ADDON: CPT

## 2019-03-10 PROCEDURE — 87086 URINE CULTURE/COLONY COUNT: CPT

## 2019-03-10 PROCEDURE — 96365 THER/PROPH/DIAG IV INF INIT: CPT

## 2019-03-10 PROCEDURE — 84443 ASSAY THYROID STIM HORMONE: CPT

## 2019-03-10 PROCEDURE — 25000003 PHARM REV CODE 250: Performed by: INTERNAL MEDICINE

## 2019-03-10 PROCEDURE — 83605 ASSAY OF LACTIC ACID: CPT

## 2019-03-10 PROCEDURE — 25000003 PHARM REV CODE 250: Performed by: EMERGENCY MEDICINE

## 2019-03-10 PROCEDURE — 80053 COMPREHEN METABOLIC PANEL: CPT

## 2019-03-10 PROCEDURE — 94640 AIRWAY INHALATION TREATMENT: CPT

## 2019-03-10 PROCEDURE — 87040 BLOOD CULTURE FOR BACTERIA: CPT

## 2019-03-10 PROCEDURE — 25000242 PHARM REV CODE 250 ALT 637 W/ HCPCS: Performed by: EMERGENCY MEDICINE

## 2019-03-10 PROCEDURE — 96372 THER/PROPH/DIAG INJ SC/IM: CPT | Mod: 59 | Performed by: EMERGENCY MEDICINE

## 2019-03-10 PROCEDURE — 36415 COLL VENOUS BLD VENIPUNCTURE: CPT

## 2019-03-10 PROCEDURE — 63600175 PHARM REV CODE 636 W HCPCS: Performed by: EMERGENCY MEDICINE

## 2019-03-10 PROCEDURE — 85025 COMPLETE CBC W/AUTO DIFF WBC: CPT

## 2019-03-10 PROCEDURE — 93005 ELECTROCARDIOGRAM TRACING: CPT

## 2019-03-10 PROCEDURE — G0378 HOSPITAL OBSERVATION PER HR: HCPCS

## 2019-03-10 PROCEDURE — 87088 URINE BACTERIA CULTURE: CPT

## 2019-03-10 RX ORDER — TRAZODONE HYDROCHLORIDE 50 MG/1
150 TABLET ORAL NIGHTLY
Status: DISCONTINUED | OUTPATIENT
Start: 2019-03-10 | End: 2019-03-13 | Stop reason: HOSPADM

## 2019-03-10 RX ORDER — DIAZEPAM 5 MG/1
5 TABLET ORAL 3 TIMES DAILY PRN
Status: DISCONTINUED | OUTPATIENT
Start: 2019-03-10 | End: 2019-03-13 | Stop reason: HOSPADM

## 2019-03-10 RX ORDER — METOCLOPRAMIDE HYDROCHLORIDE 5 MG/ML
10 INJECTION INTRAMUSCULAR; INTRAVENOUS EVERY 6 HOURS PRN
Status: CANCELLED | OUTPATIENT
Start: 2019-03-10

## 2019-03-10 RX ORDER — SODIUM CHLORIDE 0.9 % (FLUSH) 0.9 %
5 SYRINGE (ML) INJECTION
Status: DISCONTINUED | OUTPATIENT
Start: 2019-03-10 | End: 2019-03-13 | Stop reason: HOSPADM

## 2019-03-10 RX ORDER — IPRATROPIUM BROMIDE AND ALBUTEROL SULFATE 2.5; .5 MG/3ML; MG/3ML
3 SOLUTION RESPIRATORY (INHALATION) EVERY 6 HOURS
Status: DISCONTINUED | OUTPATIENT
Start: 2019-03-10 | End: 2019-03-10

## 2019-03-10 RX ORDER — RAMELTEON 8 MG/1
8 TABLET ORAL NIGHTLY PRN
Status: DISCONTINUED | OUTPATIENT
Start: 2019-03-10 | End: 2019-03-13 | Stop reason: HOSPADM

## 2019-03-10 RX ORDER — ACETAMINOPHEN 325 MG/1
650 TABLET ORAL EVERY 6 HOURS PRN
Status: DISCONTINUED | OUTPATIENT
Start: 2019-03-10 | End: 2019-03-13 | Stop reason: HOSPADM

## 2019-03-10 RX ORDER — ONDANSETRON 2 MG/ML
4 INJECTION INTRAMUSCULAR; INTRAVENOUS EVERY 4 HOURS PRN
Status: DISCONTINUED | OUTPATIENT
Start: 2019-03-10 | End: 2019-03-13 | Stop reason: HOSPADM

## 2019-03-10 RX ORDER — IBUPROFEN 200 MG
1 TABLET ORAL DAILY
Status: DISCONTINUED | OUTPATIENT
Start: 2019-03-11 | End: 2019-03-13 | Stop reason: HOSPADM

## 2019-03-10 RX ORDER — IBUPROFEN 400 MG/1
800 TABLET ORAL EVERY 6 HOURS PRN
Status: DISCONTINUED | OUTPATIENT
Start: 2019-03-10 | End: 2019-03-13 | Stop reason: HOSPADM

## 2019-03-10 RX ORDER — ENOXAPARIN SODIUM 100 MG/ML
40 INJECTION SUBCUTANEOUS EVERY 24 HOURS
Status: DISCONTINUED | OUTPATIENT
Start: 2019-03-10 | End: 2019-03-12

## 2019-03-10 RX ORDER — SODIUM CHLORIDE 9 MG/ML
INJECTION, SOLUTION INTRAVENOUS CONTINUOUS
Status: DISCONTINUED | OUTPATIENT
Start: 2019-03-10 | End: 2019-03-13 | Stop reason: HOSPADM

## 2019-03-10 RX ORDER — IPRATROPIUM BROMIDE AND ALBUTEROL SULFATE 2.5; .5 MG/3ML; MG/3ML
3 SOLUTION RESPIRATORY (INHALATION) EVERY 4 HOURS
Status: DISCONTINUED | OUTPATIENT
Start: 2019-03-10 | End: 2019-03-10

## 2019-03-10 RX ORDER — IPRATROPIUM BROMIDE AND ALBUTEROL SULFATE 2.5; .5 MG/3ML; MG/3ML
3 SOLUTION RESPIRATORY (INHALATION) EVERY 6 HOURS
Status: DISCONTINUED | OUTPATIENT
Start: 2019-03-11 | End: 2019-03-13 | Stop reason: HOSPADM

## 2019-03-10 RX ORDER — SODIUM CHLORIDE 9 MG/ML
INJECTION, SOLUTION INTRAVENOUS ONCE
Status: COMPLETED | OUTPATIENT
Start: 2019-03-10 | End: 2019-03-10

## 2019-03-10 RX ORDER — LORAZEPAM 2 MG/ML
1 INJECTION INTRAMUSCULAR
Status: COMPLETED | OUTPATIENT
Start: 2019-03-10 | End: 2019-03-10

## 2019-03-10 RX ORDER — ALPRAZOLAM 1 MG/1
1 TABLET ORAL NIGHTLY PRN
Status: DISCONTINUED | OUTPATIENT
Start: 2019-03-10 | End: 2019-03-13 | Stop reason: HOSPADM

## 2019-03-10 RX ORDER — IPRATROPIUM BROMIDE AND ALBUTEROL SULFATE 2.5; .5 MG/3ML; MG/3ML
3 SOLUTION RESPIRATORY (INHALATION)
Status: COMPLETED | OUTPATIENT
Start: 2019-03-10 | End: 2019-03-10

## 2019-03-10 RX ADMIN — LORAZEPAM 1 MG: 2 INJECTION, SOLUTION INTRAMUSCULAR; INTRAVENOUS at 03:03

## 2019-03-10 RX ADMIN — SODIUM CHLORIDE: 0.9 INJECTION, SOLUTION INTRAVENOUS at 08:03

## 2019-03-10 RX ADMIN — CEFTRIAXONE 1 G: 1 INJECTION, SOLUTION INTRAVENOUS at 03:03

## 2019-03-10 RX ADMIN — QUETIAPINE FUMARATE 150 MG: 100 TABLET ORAL at 08:03

## 2019-03-10 RX ADMIN — IPRATROPIUM BROMIDE AND ALBUTEROL SULFATE 3 ML: .5; 3 SOLUTION RESPIRATORY (INHALATION) at 03:03

## 2019-03-10 RX ADMIN — IPRATROPIUM BROMIDE AND ALBUTEROL SULFATE 3 ML: .5; 3 SOLUTION RESPIRATORY (INHALATION) at 07:03

## 2019-03-10 RX ADMIN — ENOXAPARIN SODIUM 40 MG: 100 INJECTION SUBCUTANEOUS at 08:03

## 2019-03-10 NOTE — SUBJECTIVE & OBJECTIVE
Past Medical History:   Diagnosis Date    Cancer     Thyroid    Gastro-esophageal reflux     Hepatitis C 2016    History of PCOS     Hyperthyroidism     Hypoglycemia     Tachycardia     Thyroid disease     hypothyroid, pappilary carcinoma       Past Surgical History:   Procedure Laterality Date     SECTION      x3    COLONOSCOPY N/A 2014    [facility]O    EGD (ESOPHAGOGASTRODUODENOSCOPY) N/A 10/7/2013    [facility] ENDO    EGD (ESOPHAGOGASTRODUODENOSCOPY) N/A 10/2/2013    [facility] ENDO    ESOPHAGOGASTRODUODENOSCOPY (EGD) N/A 2014    [facility] ENDO    HYSTERECTOMY      lymthnodes      from the right arm     thyroid nodule biopsy      THYROIDECTOMY      10/17/13    THYROIDECTOMY Left 2013    [facility] Keith [facility] OR    THYROIDECTOMY Right 10/17/2013    [facility]    TONSILLECTOMY, ADENOIDECTOMY      TUBAL LIGATION         Review of patient's allergies indicates:   Allergen Reactions    Levaquin [levofloxacin] Swelling and Rash     Joint pain, abdominal pain       No current facility-administered medications on file prior to encounter.      Current Outpatient Medications on File Prior to Encounter   Medication Sig    albuterol-ipratropium 2.5mg-0.5mg/3mL (DUO-NEB) 0.5 mg-3 mg(2.5 mg base)/3 mL nebulizer solution Take 3 mLs by nebulization every 4 (four) hours. Rescue    ALPRAZolam (XANAX) 1 MG tablet Take 1 tablet (1 mg total) by mouth nightly as needed for Anxiety.    diclofenac (VOLTAREN) 75 MG EC tablet Take 1 tablet (75 mg total) by mouth 2 (two) times daily as needed.    glucagon (human recombinant) inj 1mg/mL kit Inject as per directions IM    ibuprofen (ADVIL,MOTRIN) 800 MG tablet Take 1 tablet (800 mg total) by mouth every 6 (six) hours as needed for Pain.    levothyroxine (SYNTHROID,  LEVOTHROID) 175 MCG tablet Take 1 tablet (175 mcg total) by mouth once daily.    lidocaine (LIDODERM) 5 % Place 1 patch onto the skin once daily. Remove & Discard patch within 12 hours or as directed by MD    potassium chloride SA (K-DUR,KLOR-CON) 20 MEQ tablet Take 1 tablet (20 mEq total) by mouth once daily.    QUEtiapine (SEROQUEL) 200 MG Tab Take by mouth.    trazodone (DESYREL) 150 MG tablet Take 150 mg by mouth every evening.     Family History     Problem Relation (Age of Onset)    Breast cancer Maternal Grandmother    Cancer Mother    Cataracts Maternal Grandmother    Ovarian cancer Maternal Grandmother, Mother        Tobacco Use    Smoking status: Current Every Day Smoker     Packs/day: 1.00     Types: Cigarettes    Smokeless tobacco: Never Used   Substance and Sexual Activity    Alcohol use: Yes     Comment: socialy     Drug use: No     Comment: Prescription Methadone    Sexual activity: Yes     Partners: Male     Birth control/protection: Surgical, None     Review of Systems   Reason unable to perform ROS: Pt currently sedated from Iv ativan.     Objective:     Vital Signs (Most Recent):  Temp: 99.6 °F (37.6 °C) (03/10/19 1326)  Pulse: 104 (03/10/19 1631)  Resp: (!) 22 (03/10/19 1508)  BP: 132/76 (03/10/19 1631)  SpO2: 100 % (03/10/19 1631) Vital Signs (24h Range):  Temp:  [99.6 °F (37.6 °C)] 99.6 °F (37.6 °C)  Pulse:  [100-106] 104  Resp:  [20-22] 22  SpO2:  [90 %-100 %] 100 %  BP: (102-132)/(67-77) 132/76     Weight: 70.3 kg (155 lb)  Body mass index is 29.29 kg/m².    Physical Exam   Constitutional: She appears well-developed. No distress.   Sleepy, arousable then back to sleep   HENT:   Head: Normocephalic and atraumatic.   Eyes: Conjunctivae and EOM are normal. Pupils are equal, round, and reactive to light. Right eye exhibits no discharge. Left eye exhibits no discharge.   Neck: Normal range of motion. Neck supple. No JVD present.   Cardiovascular: Normal rate, normal heart sounds and  intact distal pulses.   No murmur heard.  Tachycardic   Pulmonary/Chest: Effort normal.   BBS diminshed   Abdominal: Soft. Bowel sounds are normal. She exhibits no distension. There is no tenderness. There is no guarding.   Genitourinary:   Genitourinary Comments: Not examined   Musculoskeletal: Normal range of motion. She exhibits no edema.   Neurological:   Sleepy   Skin: Skin is warm and dry. Capillary refill takes less than 2 seconds. She is not diaphoretic.   Multiple venipuncture wounds to bilateral upper extremities and admits to injecting methamphetamines          CRANIAL NERVES     CN III, IV, VI   Pupils are equal, round, and reactive to light.  Extraocular motions are normal.        Significant Labs: Reviewed    Significant Imaging: Reviewed
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
## 2019-03-10 NOTE — H&P
[facility] Medicine  History & Physical    Patient Name: Corina Liu  MRN: [id number]  Admission Date: 3/10/2019  Attending Physician: Matias Christianson III, MD   Primary Care Provider: Santosh Carlson MD       Pt seen in ER.    Patient information was obtained from Mother at bedside and ER records.     Subjective:     Principal Problem:Sepsis    Chief Complaint:   Chief Complaint   Patient presents with    Fever    Sore Throat    Dysuria        HPI: This  is a 34 yo  female who presents to the ED of a sore throat that started 1 week ago. She states that this sore throat has been worsening since onset 1 week ago. She relays that she has a subjective fever (1 day ago), intermittent stabbing back pain, a productive cough, and pressure with urination as well. She relays that 1 day ago she had some nausea and diarrhea as well. Patient states that she is having some body aches as well. Patient does endorse shooting up methamphetamines 1 day ago. She denies abdominal pain, vomiting, chest pain, and SOB. The patient has a PMHx of hypoglycemia, tachycardia, hepatitis C, and hyperthyroidism. The patient has a PSHx of tubal ligation, , and hysterectomy. She does admit to smoking 1 pack of cigarettes a day.  The patient was evaluated in the ER where she was noted to have Sepsis and pyelonephritis and placed in the hospital for further evaluation and treatment.         Past Medical History:   Diagnosis Date    Cancer     Thyroid    Gastro-esophageal reflux     Hepatitis C 2016    History of PCOS     Hyperthyroidism     Hypoglycemia     Tachycardia     Thyroid disease     hypothyroid, pappilary carcinoma       Past Surgical History:   Procedure Laterality Date     SECTION      x3    COLONOSCOPY N/A 2014    [facility]formed [facility]    EGD (ESOPHAGOGASTRODUODENOSCOPY) N/A 10/7/2013    [facility]formed by [facility]     EGD (ESOPHAGOGASTRODUODENOSCOPY) N/A 10/2/2013    [facility] at [facility]    ESOPHAGOGASTRODUODENOSCOPY (EGD) N/A 5/14/2014    Performed by [facility] at [facility] END[facility]    HYSTERECTOMY      lymthnodes      from the right arm     thyroid nodule biopsy      THYROIDECTOMY      10/17/13    THYROIDECTOMY Left 11/7/2013    Performed by Keith [facility] at [facility] OR    THYROIDECTOMY Right 10/17/2013    [facility]    TONSILLECTOMY, ADENOIDECTOMY      TUBAL LIGATION         Review of patient's allergies indicates:   Allergen Reactions    Levaquin [levofloxacin] Swelling and Rash     Joint pain, abdominal pain       No current facility-administered medications on file prior to encounter.      Current Outpatient Medications on File Prior to Encounter   Medication Sig    albuterol-ipratropium 2.5mg-0.5mg/3mL (DUO-NEB) 0.5 mg-3 mg(2.5 mg base)/3 mL nebulizer solution Take 3 mLs by nebulization every 4 (four) hours. Rescue    ALPRAZolam (XANAX) 1 MG tablet Take 1 tablet (1 mg total) by mouth nightly as needed for Anxiety.    diclofenac (VOLTAREN) 75 MG EC tablet Take 1 tablet (75 mg total) by mouth 2 (two) times daily as needed.    glucagon (human recombinant) inj 1mg/mL kit Inject as per directions IM    ibuprofen (ADVIL,MOTRIN) 800 MG tablet Take 1 tablet (800 mg total) by mouth every 6 (six) hours as needed for Pain.    levothyroxine (SYNTHROID, LEVOTHROID) 175 MCG tablet Take 1 tablet (175 mcg total) by mouth once daily.    lidocaine (LIDODERM) 5 % Place 1 patch onto the skin once daily. Remove & Discard patch within 12 hours or as directed by MD    potassium chloride SA (K-DUR,KLOR-CON) 20 MEQ tablet Take 1 tablet (20 mEq total) by mouth once daily.    QUEtiapine (SEROQUEL) 200 MG Tab Take by mouth.    trazodone (DESYREL) 150 MG tablet Take 150 mg by mouth every evening.     Family History     Problem Relation (Age of Onset)    Breast cancer  Maternal Grandmother    Cancer Mother    Cataracts Maternal Grandmother    Ovarian cancer Maternal Grandmother, Mother        Tobacco Use    Smoking status: Current Every Day Smoker     Packs/day: 1.00     Types: Cigarettes    Smokeless tobacco: Never Used   Substance and Sexual Activity    Alcohol use: Yes     Comment: socialy     Drug use: No     Comment: Prescription Methadone    Sexual activity: Yes     Partners: Male     Birth control/protection: Surgical, None     Review of Systems   Reason unable to perform ROS: Pt currently sedated from Iv ativan.     Objective:     Vital Signs (Most Recent):  Temp: 99.6 °F (37.6 °C) (03/10/19 1326)  Pulse: 104 (03/10/19 1631)  Resp: (!) 22 (03/10/19 1508)  BP: 132/76 (03/10/19 1631)  SpO2: 100 % (03/10/19 1631) Vital Signs (24h Range):  Temp:  [99.6 °F (37.6 °C)] 99.6 °F (37.6 °C)  Pulse:  [100-106] 104  Resp:  [20-22] 22  SpO2:  [90 %-100 %] 100 %  BP: (102-132)/(67-77) 132/76     Weight: 70.3 kg (155 lb)  Body mass index is 29.29 kg/m².    Physical Exam   Constitutional: She appears well-developed. No distress.   Sleepy, arousable then back to sleep   HENT:   Head: Normocephalic and atraumatic.   Eyes: Conjunctivae and EOM are normal. Pupils are equal, round, and reactive to light. Right eye exhibits no discharge. Left eye exhibits no discharge.   Neck: Normal range of motion. Neck supple. No JVD present.   Cardiovascular: Normal rate, normal heart sounds and intact distal pulses.   No murmur heard.  Tachycardic   Pulmonary/Chest: Effort normal.   BBS diminshed   Abdominal: Soft. Bowel sounds are normal. She exhibits no distension. There is no tenderness. There is no guarding.   Genitourinary:   Genitourinary Comments: Not examined   Musculoskeletal: Normal range of motion. She exhibits no edema.   Neurological:   Sleepy   Skin: Skin is warm and dry. Capillary refill takes less than 2 seconds. She is not diaphoretic.   Multiple venipuncture wounds to bilateral upper  extremities and admits to injecting methamphetamines          CRANIAL NERVES     CN III, IV, VI   Pupils are equal, round, and reactive to light.  Extraocular motions are normal.        Significant Labs: Reviewed    Significant Imaging: Reviewed    Assessment/Plan:     * Sepsis    Pyelonephritis    This patient does have evidence of infective focus  My overall impression is sepsis.  Antibiotics given-   Antibiotics (From admission, onward)    None          Latest lactate reviewed, they are-  No results for input(s): LACTATE in the last 24 hours.      Source- Urine  Source control Achieved by- Iv Abx    Blood cultures and Urine culture ordered  Continue Iv rocephin  Add IV bolus then followed by IVF    Check lactic acid level         Anxiety disorder    Continue home medications  Add tid prn valium         COPD (chronic obstructive pulmonary disease)    Monitor O2 sats, supplemental O2 as needed  Add duonebs       Nicotine dependence    Smoking cessation education  Add nicotine patch       IV drug user    Add Prn Valium  Check echocardiogram  Check blood cultures x 2  Monitor for signs of withdrawal       Thyroid disease    Continue home medication  Check TSH       Hepatitis C    Per History         VTE Risk Mitigation (From admission, onward)    None           MAYDA Soni  Department of Hospital Medicine   [facility]    Time spent seeing patient( greater than 1/2 spent in direct contact) : 74 minutes

## 2019-03-10 NOTE — HPI
This is a 34 yo female who presents to the ED of a sore throat that started 1 week ago. She states that this sore throat has been worsening since onset 1 week ago. She relays that she has a subjective fever (1 day ago), intermittent stabbing back pain, a productive cough, and pressure with urination as well. She relays that 1 day ago she had some nausea and diarrhea as well. Patient states that she is having some body aches as well. Patient does endorse shooting up methamphetamines 1 day ago. She denies abdominal pain, vomiting, chest pain, and SOB. The patient has a PMHx of hypoglycemia, tachycardia, hepatitis C, and hyperthyroidism. The patient has a PSHx of tubal ligation, , and hysterectomy. She does admit to smoking 1 pack of cigarettes a day. The patient was evaluated in the ER where she was noted to have Sepsis and pyelonephritis and placed in the hospital for further evaluation and treatment.

## 2019-03-10 NOTE — ED PROVIDER NOTES
Encounter Date: 3/10/2019    SCRIBE #1 NOTE: I, Chaitanya Amor, am scribing for, and in the presence of, Dr. Christianson.       History     Chief Complaint   Patient presents with    Fever    Sore Throat    Dysuria     Time seen by provider: 1:35 PM on 03/10/2019      Corina Liu is a 33 y.o. female who presents to the ED of a sore throat that started 1 week ago. She states that this sore throat has been worsening since onset 1 week ago. She relays that she has a subjective fever (1 day ago), intermittent stabbing back pain, a productive cough, and pressure with urination as well. She relays that 1 day ago she had some nausea and diarrhea as well. Patient states that she is having some body aches as well. Patient does endorse shooting up methamphetamines 1 day ago. She denies abdominal pain, vomiting, chest pain, and SOB. The patient has a PMHx of hypoglycemia, tachycardia, hepatitis C, and hyperthyroidism. The patient has a PSHx of tubal ligation, , and hysterectomy. She does admit to smoking 1 pack of cigarettes a day.       The history is provided by the patient.     Review of patient's allergies indicates:   Allergen Reactions    Levaquin [levofloxacin] Swelling and Rash     Joint pain, abdominal pain     Past Medical History:   Diagnosis Date    Cancer     Thyroid    Gastro-esophageal reflux     Hepatitis C 2016    History of PCOS     Hyperthyroidism     Hypoglycemia     Tachycardia     Thyroid disease     hypothyroid, pappilary carcinoma     Past Surgical History:   Procedure Laterality Date     SECTION      x3    COLONOSCOPY N/A 2014    Performed by [facility] at [facility] ENDO    EGD (ESOPHAGOGASTRODUODENOSCOPY) N/A 10/7/2013    Performed by [facility] at [facility] ENDO    EGD (ESOPHAGOGASTRODUODENOSCOPY) N/A 10/2/2013    Performed by [facility] at [facility] ENDO    ESOPHAGOGASTRODUODENOSCOPY (EGD) N/A 2014    Performed by Phoenix Fernandez,  MD at [facility] ENDO    HYSTERECTOMY      lymthnodes      from the right arm     thyroid nodule biopsy      THYROIDECTOMY      10/17/13    THYROIDECTOMY Left 11/7/2013    Performed by Keith Murray [facility] at [facility] OR    THYROIDECTOMY Right 10/17/2013    [facility] by [facility] at [facility] OR    TONSILLECTOMY, ADENOIDECTOMY      TUBAL LIGATION       Family History   Problem Relation Age of Onset    Breast cancer Maternal Grandmother     Ovarian cancer Maternal Grandmother     Cataracts Maternal Grandmother     Cancer Mother         VULVAR AND CERVICAL    Ovarian cancer Mother     Colon cancer Neg Hx      Social History     Tobacco Use    Smoking status: Current Every Day Smoker     Packs/day: 1.00     Types: Cigarettes    Smokeless tobacco: Never Used   Substance Use Topics    Alcohol use: Yes     Comment: socialy     Drug use: No     Comment: Prescription Methadone     Review of Systems   Constitutional: Positive for fever. Negative for activity change, appetite change, chills and fatigue.   Eyes: Negative for visual disturbance.   Respiratory: Positive for cough. Negative for apnea and shortness of breath.    Cardiovascular: Negative for chest pain and palpitations.   Gastrointestinal: Positive for diarrhea and nausea. Negative for abdominal distention, abdominal pain and vomiting.   Genitourinary: Positive for dysuria. Negative for difficulty urinating.   Musculoskeletal: Positive for back pain. Negative for neck pain.   Skin: Negative for pallor and rash.   Neurological: Negative for headaches.   Hematological: Does not bruise/bleed easily.   Psychiatric/Behavioral: Negative for agitation.       Physical Exam     Initial Vitals [03/10/19 1326]   BP Pulse Resp Temp SpO2   116/67 100 20 99.6 °F (37.6 °C) 96 %      MAP       --         Physical Exam    Nursing note and vitals reviewed.  Constitutional: She appears well-developed and well-nourished.   HENT:   Head: Normocephalic and atraumatic.   Eyes:  Conjunctivae are normal.   Neck: Normal range of motion. Neck supple.   Cardiovascular: Regular rhythm and normal heart sounds. Tachycardia present.  Exam reveals no gallop and no friction rub.    No murmur heard.  Pulmonary/Chest: Effort normal and breath sounds normal. No respiratory distress. She has no wheezes. She has no rhonchi. She has no rales.   Abdominal: Soft. She exhibits no distension. There is no tenderness.   Musculoskeletal: Normal range of motion.   Lymphadenopathy:     She has cervical adenopathy (tender).   Neurological: She is alert and oriented to person, place, and time.   Skin: Skin is warm and dry. No erythema.   Multiple venipuncture wounds to bilateral upper extremities and admits to injecting methamphetamines   Psychiatric: She has a normal mood and affect.         ED Course   Procedures  Labs Reviewed   URINALYSIS, REFLEX TO URINE CULTURE     EKG Readings: (Independently Interpreted)   Rhythm: Normal Sinus Rhythm. Heart Rate: 99. Ectopy: No Ectopy. Conduction: Normal. ST Segments: Normal ST Segments. T Waves: Normal. Axis: Normal. Clinical Impression: Normal Sinus Rhythm       Imaging Results    None          Medical Decision Making:   History:   Old Medical Records: I decided to obtain old medical records.  Clinical Tests:   Lab Tests: Ordered and Reviewed  Radiological Study: Ordered and Reviewed  ED Management:  33-year-old female presents with a one-week history of fever sore throat, dysuria, headache and a predominantly nonproductive cough.  She also reports back pain. She has a history of routine methamphetamine intravenous usage.  She is found to have a urinalysis consistent with pyelonephritis.  In view of the fact that she has excessive vomiting she will be admitted for IV antibiotics.  Other:   I have discussed this case with another health care provider.       <> Summary of the Discussion: Discussed with Dr. Espana who will admit the patient       APC / Resident Notes:   IDr.  Matias Christianson III, personally performed the services described in this documentation. All medical record entries made by the scribe were at my direction and in my presence.  I have reviewed the chart and agree that the record reflects my personal performance and is accurate and complete       Scribe Attestation:   Scribe #1: I performed the above scribed service and the documentation accurately describes the services I performed. I attest to the accuracy of the note.               Clinical Impression:       ICD-10-CM ICD-9-CM   1. Cough R05 786.2         Disposition:   Disposition: Admitted                        Matias Christianson III, MD  03/10/19 9379

## 2019-03-10 NOTE — ASSESSMENT & PLAN NOTE
Pyelonephritis    This patient does have evidence of infective focus  My overall impression is sepsis.  Antibiotics given-   Antibiotics (From admission, onward)    None          Latest lactate reviewed, they are-  No results for input(s): LACTATE in the last 24 hours.      Source- Urine  Source control Achieved by- Iv Abx    Blood cultures and Urine culture ordered  Continue Iv rocephin  Add IV bolus then followed by IVF    Check lactic acid level

## 2019-03-11 PROBLEM — I95.9 HYPOTENSION: Status: ACTIVE | Noted: 2019-03-11

## 2019-03-11 LAB
ALBUMIN SERPL BCP-MCNC: 2.7 G/DL
ALP SERPL-CCNC: 49 U/L
ALT SERPL W/O P-5'-P-CCNC: 9 U/L
ANION GAP SERPL CALC-SCNC: 7 MMOL/L
AST SERPL-CCNC: 13 U/L
BASOPHILS # BLD AUTO: 0 K/UL
BASOPHILS NFR BLD: 0.7 %
BILIRUB SERPL-MCNC: 0.5 MG/DL
BUN SERPL-MCNC: 6 MG/DL
CALCIUM SERPL-MCNC: 7.7 MG/DL
CHLORIDE SERPL-SCNC: 106 MMOL/L
CO2 SERPL-SCNC: 26 MMOL/L
CREAT SERPL-MCNC: 0.8 MG/DL
DIFFERENTIAL METHOD: ABNORMAL
EOSINOPHIL # BLD AUTO: 0.2 K/UL
EOSINOPHIL NFR BLD: 3.8 %
ERYTHROCYTE [DISTWIDTH] IN BLOOD BY AUTOMATED COUNT: 13.2 %
EST. GFR  (AFRICAN AMERICAN): >60 ML/MIN/1.73 M^2
EST. GFR  (NON AFRICAN AMERICAN): >60 ML/MIN/1.73 M^2
GLUCOSE SERPL-MCNC: 103 MG/DL
HCT VFR BLD AUTO: 33 %
HGB BLD-MCNC: 10.9 G/DL
LYMPHOCYTES # BLD AUTO: 1.7 K/UL
LYMPHOCYTES NFR BLD: 26.7 %
MAGNESIUM SERPL-MCNC: 1.5 MG/DL
MCH RBC QN AUTO: 30.4 PG
MCHC RBC AUTO-ENTMCNC: 33.2 G/DL
MCV RBC AUTO: 92 FL
MONOCYTES # BLD AUTO: 0.5 K/UL
MONOCYTES NFR BLD: 7.4 %
NEUTROPHILS # BLD AUTO: 4 K/UL
NEUTROPHILS NFR BLD: 61.4 %
PLATELET # BLD AUTO: 162 K/UL
PMV BLD AUTO: 10.5 FL
POTASSIUM SERPL-SCNC: 3.3 MMOL/L
PROT SERPL-MCNC: 5.2 G/DL
RBC # BLD AUTO: 3.6 M/UL
SODIUM SERPL-SCNC: 139 MMOL/L
WBC # BLD AUTO: 6.5 K/UL

## 2019-03-11 PROCEDURE — 25000003 PHARM REV CODE 250: Performed by: INTERNAL MEDICINE

## 2019-03-11 PROCEDURE — S4991 NICOTINE PATCH NONLEGEND: HCPCS | Performed by: NURSE PRACTITIONER

## 2019-03-11 PROCEDURE — 85025 COMPLETE CBC W/AUTO DIFF WBC: CPT

## 2019-03-11 PROCEDURE — 80053 COMPREHEN METABOLIC PANEL: CPT

## 2019-03-11 PROCEDURE — 96361 HYDRATE IV INFUSION ADD-ON: CPT | Performed by: EMERGENCY MEDICINE

## 2019-03-11 PROCEDURE — 12000002 HC ACUTE/MED SURGE SEMI-PRIVATE ROOM

## 2019-03-11 PROCEDURE — 94761 N-INVAS EAR/PLS OXIMETRY MLT: CPT

## 2019-03-11 PROCEDURE — 25000003 PHARM REV CODE 250: Performed by: NURSE PRACTITIONER

## 2019-03-11 PROCEDURE — 96376 TX/PRO/DX INJ SAME DRUG ADON: CPT | Performed by: EMERGENCY MEDICINE

## 2019-03-11 PROCEDURE — 36415 COLL VENOUS BLD VENIPUNCTURE: CPT

## 2019-03-11 PROCEDURE — 25000003 PHARM REV CODE 250: Performed by: EMERGENCY MEDICINE

## 2019-03-11 PROCEDURE — 63600175 PHARM REV CODE 636 W HCPCS: Performed by: INTERNAL MEDICINE

## 2019-03-11 PROCEDURE — 94640 AIRWAY INHALATION TREATMENT: CPT

## 2019-03-11 PROCEDURE — 25000003 PHARM REV CODE 250: Performed by: HOSPITALIST

## 2019-03-11 PROCEDURE — 83735 ASSAY OF MAGNESIUM: CPT

## 2019-03-11 PROCEDURE — 25000242 PHARM REV CODE 250 ALT 637 W/ HCPCS: Performed by: HOSPITALIST

## 2019-03-11 RX ORDER — LOPERAMIDE HYDROCHLORIDE 2 MG/1
2 CAPSULE ORAL 4 TIMES DAILY PRN
Status: DISCONTINUED | OUTPATIENT
Start: 2019-03-11 | End: 2019-03-13 | Stop reason: HOSPADM

## 2019-03-11 RX ORDER — SODIUM CHLORIDE 9 MG/ML
INJECTION, SOLUTION INTRAVENOUS CONTINUOUS
Status: DISCONTINUED | OUTPATIENT
Start: 2019-03-11 | End: 2019-03-13 | Stop reason: HOSPADM

## 2019-03-11 RX ADMIN — IPRATROPIUM BROMIDE AND ALBUTEROL SULFATE 3 ML: .5; 3 SOLUTION RESPIRATORY (INHALATION) at 07:03

## 2019-03-11 RX ADMIN — QUETIAPINE FUMARATE 150 MG: 100 TABLET ORAL at 09:03

## 2019-03-11 RX ADMIN — LOPERAMIDE HYDROCHLORIDE 2 MG: 2 CAPSULE ORAL at 03:03

## 2019-03-11 RX ADMIN — ONDANSETRON 4 MG: 2 INJECTION INTRAMUSCULAR; INTRAVENOUS at 06:03

## 2019-03-11 RX ADMIN — ENOXAPARIN SODIUM 40 MG: 100 INJECTION SUBCUTANEOUS at 03:03

## 2019-03-11 RX ADMIN — SODIUM CHLORIDE: 0.9 INJECTION, SOLUTION INTRAVENOUS at 02:03

## 2019-03-11 RX ADMIN — IPRATROPIUM BROMIDE AND ALBUTEROL SULFATE 3 ML: .5; 3 SOLUTION RESPIRATORY (INHALATION) at 12:03

## 2019-03-11 RX ADMIN — LEVOTHYROXINE SODIUM 175 MCG: 150 TABLET ORAL at 06:03

## 2019-03-11 RX ADMIN — ALPRAZOLAM 1 MG: 1 TABLET ORAL at 09:03

## 2019-03-11 RX ADMIN — CEFTRIAXONE 1 G: 1 INJECTION, SOLUTION INTRAVENOUS at 03:03

## 2019-03-11 RX ADMIN — SODIUM CHLORIDE: 0.9 INJECTION, SOLUTION INTRAVENOUS at 09:03

## 2019-03-11 RX ADMIN — IPRATROPIUM BROMIDE AND ALBUTEROL SULFATE 3 ML: .5; 3 SOLUTION RESPIRATORY (INHALATION) at 01:03

## 2019-03-11 RX ADMIN — NICOTINE 1 PATCH: 21 PATCH TRANSDERMAL at 09:03

## 2019-03-11 NOTE — HOSPITAL COURSE
The patient was monitored closely during her stay.  She was given IV fluids for hydration.  A urine culture was sent to the lab.Urine culture +e coli. The patient was started on IV Rocephin for her pyelonephritis and sepsis.  Blood cultures x2 were also done.  Her lactic acid level was within normal limits.  The patient was later noted to have some hypotension and received additional IV normal saline bolus. Due to her history of IV drug use, an echocardiogram was also ordered. EF 60% with no vegetate growth. She reports increased abdominal pain and underwent CT abdomen which demonstrated no acute findings, moderate stool in colon. She was given laxative with good results. She is feeling better and ambulating in halls. She is stable for DC.       PE: chest CTA heart RRR abd: soft NT

## 2019-03-11 NOTE — ASSESSMENT & PLAN NOTE
Pyelonephritis    This patient does have evidence of infective focus  My overall impression is sepsis.  Antibiotics given-   Antibiotics (From admission, onward)    Start     Stop Route Frequency Ordered    03/11/19 1500  cefTRIAXone (ROCEPHIN) 1 g in dextrose 5 % 50 mL IVPB      -- IV Every 24 hours (non-standard times) 03/10/19 1954          Latest lactate reviewed, they are-  Recent Labs   Lab 03/10/19  2000   LACTATE 0.7         Source- Urine  Source control Achieved by- Iv Abx    Blood cultures x 2 no growth so far    Urine culture pending    Continue Iv rocephin

## 2019-03-11 NOTE — ASSESSMENT & PLAN NOTE
Continue home medication  TSH level noted- Patient reports she hasn't been taking her home medication so will continue current home dose

## 2019-03-11 NOTE — PLAN OF CARE
Problem: Adult Inpatient Plan of Care  Goal: Plan of Care Review  Outcome: Ongoing (interventions implemented as appropriate)  Pt alert and oriented x 4  Positioned supine with HOB elevated   POC reviewed with pt, acknowledged and understood  BP slightly decreased, asymptomatic   Bed locked in lowest positioned   Call light in reach  Pt with no fever or c/o of pain   Bed alarm applied   Pt free of falls on shift  Will continue to monitor

## 2019-03-11 NOTE — PLAN OF CARE
Problem: Adult Inpatient Plan of Care  Goal: Plan of Care Review  Outcome: Ongoing (interventions implemented as appropriate)  Sue aerosol tx well BBS clear

## 2019-03-11 NOTE — PLAN OF CARE
03/11/19 0005   Patient Assessment/Suction   Level of Consciousness (AVPU) alert   Respiratory Effort Unlabored   Expansion/Accessory Muscles/Retractions no retractions;no use of accessory muscles   All Lung Fields Breath Sounds clear   Rhythm/Pattern, Respiratory unlabored   Cough Frequency infrequent   Cough Type nonproductive;good   PRE-TX-O2   O2 Device (Oxygen Therapy) room air   SpO2 99 %   Pulse Oximetry Type Intermittent   Pulse 92   Resp 16   Aerosol Therapy   $ Aerosol Therapy Charges Aerosol Treatment   Respiratory Treatment Status (SVN) given   Treatment Route (SVN) mask   Patient Position (SVN) HOB elevated   Post Treatment Assessment (SVN) breath sounds unchanged   Signs of Intolerance (SVN) none   Breath Sounds Post-Respiratory Treatment   Throughout All Fields Post-Treatment All Fields   Throughout All Fields Post-Treatment no change   Post-treatment Heart Rate (beats/min) 96   Post-treatment Resp Rate (breaths/min) 16

## 2019-03-11 NOTE — PROGRESS NOTES
[facility] Hospital Medicine  Progress Note    Patient Name: Corina Liu  MRN: [id number]  Patient Class: OP- Observation   Admission Date: 3/10/2019  Length of Stay: 0 days  Attending Physician: Juan Ryder MD  Primary Care Provider: Santosh Carlson MD      Subjective:     Principal Problem:Sepsis    HPI:  This is a 34 yo female who presents to the ED of a sore throat that started 1 week ago. She states that this sore throat has been worsening since onset 1 week ago. She relays that she has a subjective fever (1 day ago), intermittent stabbing back pain, a productive cough, and pressure with urination as well. She relays that 1 day ago she had some nausea and diarrhea as well. Patient states that she is having some body aches as well. Patient does endorse shooting up methamphetamines 1 day ago. She denies abdominal pain, vomiting, chest pain, and SOB. The patient has a PMHx of hypoglycemia, tachycardia, hepatitis C, and hyperthyroidism. The patient has a PSHx of tubal ligation, , and hysterectomy. She does admit to smoking 1 pack of cigarettes a day.  The patient was evaluated in the ER where she was noted to have Sepsis and pyelonephritis and placed in the hospital for further evaluation and treatment.         Hospital Course:  The patient was monitored closely during her stay.  She was given IV fluids for hydration.  A urine culture was sent to the lab.  The patient was started on IV Rocephin for her pyelonephritis and sepsis.  Blood cultures x2 were also done.  Her lactic acid level was within normal limits.  The patient was later noted to have some hypotension and received additional IV normal saline bolus. Due to her history of IV drug use, an echocardiogram was also ordered.     Interval  History: Some hypotension noted this am and additional IVF NS bolus ordered.      Review of Systems   Reason unable to perform ROS:     Constitutional: Positive for fatigue and  fever. Negative for activity change and appetite change.   HENT: Negative for ear pain, facial swelling and hearing loss.    Eyes: Negative for pain and redness.   Respiratory: Negative for cough and shortness of breath.    Cardiovascular: Negative for chest pain, palpitations and leg swelling.   Gastrointestinal: Positive for abdominal pain. Negative for constipation, diarrhea, nausea and vomiting.   Endocrine: Negative for polydipsia and polyphagia.   Genitourinary: Negative for difficulty urinating, dysuria, flank pain and hematuria.   Musculoskeletal: Negative for gait problem.   Skin: Negative for color change.   Allergic/Immunologic: Negative for food allergies.   Neurological: Positive for weakness. Negative for facial asymmetry and speech difficulty.   Hematological: Does not bruise/bleed easily.   Psychiatric/Behavioral: Negative for agitation, behavioral problems, confusion, hallucinations and suicidal ideas. The patient is not nervous/anxious.      Objective:     Vital Signs (Most Recent):  Temp: 98.2 °F (36.8 °C) (03/11/19 0741)  Pulse: 77 (03/11/19 0742)  Resp: 16 (03/11/19 0741)  BP: (!) 81/47 (03/11/19 0742)  SpO2: 98 % (03/11/19 0742) Vital Signs (24h Range):  Temp:  [97.5 °F (36.4 °C)-99.6 °F (37.6 °C)] 98.2 °F (36.8 °C)  Pulse:  [] 77  Resp:  [12-22] 16  SpO2:  [90 %-100 %] 98 %  BP: ()/(44-77) 81/47     Weight: 70.3 kg (155 lb)  Body mass index is 29.29 kg/m².    Physical Exam   Constitutional: She is oriented to person, place, and time. She appears well-developed and well-nourished. No distress.       HENT:   Head: Normocephalic and atraumatic.   Eyes: Conjunctivae and EOM are normal. Pupils are equal, round, and reactive to light. Right eye exhibits no discharge. Left eye exhibits no discharge.   Neck: Normal range of motion. Neck supple. No JVD present.   Cardiovascular: Normal rate, normal heart sounds and intact distal pulses.   No murmur heard.      Pulmonary/Chest: Effort normal  and breath sounds normal.       Abdominal: Soft. Bowel sounds are normal. She exhibits no distension. There is no tenderness. There is no guarding.   Genitourinary:   Genitourinary Comments: Not examined   Musculoskeletal: Normal range of motion. She exhibits no edema.   Neurological: She is alert and oriented to person, place, and time. No cranial nerve deficit.       Skin: Skin is warm and dry. Capillary refill takes less than 2 seconds. She is not diaphoretic.   Multiple venipuncture wounds to bilateral upper extremities and admits to injecting methamphetamines    Psychiatric: She has a normal mood and affect. Her behavior is normal. Judgment and thought content normal.         CRANIAL NERVES     CN III, IV, VI   Pupils are equal, round, and reactive to light.  Extraocular motions are normal.     Labs: Reviewed         Assessment/Plan:      * Sepsis    Pyelonephritis    This patient does have evidence of infective focus  My overall impression is sepsis.  Antibiotics given-   Antibiotics (From admission, onward)    Start     Stop Route Frequency Ordered    03/11/19 1500  cefTRIAXone (ROCEPHIN) 1 g in dextrose 5 % 50 mL IVPB      -- IV Every 24 hours (non-standard times) 03/10/19 1954          Latest lactate reviewed, they are-  Recent Labs   Lab 03/10/19  2000   LACTATE 0.7         Source- Urine  Source control Achieved by- Iv Abx    Blood cultures x 2 no growth so far    Urine culture pending    Continue Iv rocephin            Hypotension    Add IVF NS bolus this am x 1 liter       Anxiety disorder    Continue home medications  Continue  tid prn valium         COPD (chronic obstructive pulmonary disease)    Monitor O2 sats, supplemental O2 as needed  Continue  duonebs       Nicotine dependence    Smoking cessation education  Continue  nicotine patch       IV drug user    Continue Prn Valium  Echocardiogram pending   Blood cultures x 2 no growth so far  Monitor for signs of withdrawal       Thyroid disease     Continue home medication  TSH level noted- Patient reports she hasn't been taking her home medication so will continue current home dose       Hepatitis C    Per History         VTE Risk Mitigation (From admission, onward)        Ordered     enoxaparin injection 40 mg  Daily      03/10/19 1954     IP VTE HIGH RISK PATIENT  Once      03/10/19 1954          MAYDA Soni  Department of Hospital Medicine   [facility]    Time spent seeing patient( greater than 1/2 spent in direct contact) : 38 minutes

## 2019-03-11 NOTE — SUBJECTIVE & OBJECTIVE
Interval  History: Some hypotension noted this am and additional IVF NS bolus ordered.      Review of Systems   Reason unable to perform ROS:     Constitutional: Positive for fatigue and fever. Negative for activity change and appetite change.   HENT: Negative for ear pain, facial swelling and hearing loss.    Eyes: Negative for pain and redness.   Respiratory: Negative for cough and shortness of breath.    Cardiovascular: Negative for chest pain, palpitations and leg swelling.   Gastrointestinal: Positive for abdominal pain. Negative for constipation, diarrhea, nausea and vomiting.   Endocrine: Negative for polydipsia and polyphagia.   Genitourinary: Negative for difficulty urinating, dysuria, flank pain and hematuria.   Musculoskeletal: Negative for gait problem.   Skin: Negative for color change.   Allergic/Immunologic: Negative for food allergies.   Neurological: Positive for weakness. Negative for facial asymmetry and speech difficulty.   Hematological: Does not bruise/bleed easily.   Psychiatric/Behavioral: Negative for agitation, behavioral problems, confusion, hallucinations and suicidal ideas. The patient is not nervous/anxious.      Objective:     Vital Signs (Most Recent):  Temp: 98.2 °F (36.8 °C) (03/11/19 0741)  Pulse: 77 (03/11/19 0742)  Resp: 16 (03/11/19 0741)  BP: (!) 81/47 (03/11/19 0742)  SpO2: 98 % (03/11/19 0742) Vital Signs (24h Range):  Temp:  [97.5 °F (36.4 °C)-99.6 °F (37.6 °C)] 98.2 °F (36.8 °C)  Pulse:  [] 77  Resp:  [12-22] 16  SpO2:  [90 %-100 %] 98 %  BP: ()/(44-77) 81/47     Weight: 70.3 kg (155 lb)  Body mass index is 29.29 kg/m².    Physical Exam   Constitutional: She is oriented to person, place, and time. She appears well-developed and well-nourished. No distress.       HENT:   Head: Normocephalic and atraumatic.   Eyes: Conjunctivae and EOM are normal. Pupils are equal, round, and reactive to light. Right eye exhibits no discharge. Left eye exhibits no discharge.    Neck: Normal range of motion. Neck supple. No JVD present.   Cardiovascular: Normal rate, normal heart sounds and intact distal pulses.   No murmur heard.      Pulmonary/Chest: Effort normal and breath sounds normal.       Abdominal: Soft. Bowel sounds are normal. She exhibits no distension. There is no tenderness. There is no guarding.   Genitourinary:   Genitourinary Comments: Not examined   Musculoskeletal: Normal range of motion. She exhibits no edema.   Neurological: She is alert and oriented to person, place, and time. No cranial nerve deficit.       Skin: Skin is warm and dry. Capillary refill takes less than 2 seconds. She is not diaphoretic.   Multiple venipuncture wounds to bilateral upper extremities and admits to injecting methamphetamines    Psychiatric: She has a normal mood and affect. Her behavior is normal. Judgment and thought content normal.         CRANIAL NERVES     CN III, IV, VI   Pupils are equal, round, and reactive to light.  Extraocular motions are normal.     Labs: Reviewed

## 2019-03-11 NOTE — PLAN OF CARE
Problem: Adult Inpatient Plan of Care  Goal: Plan of Care Review  Outcome: Ongoing (interventions implemented as appropriate)   03/11/19 2971   Plan of Care Review   Plan of Care Reviewed With patient   Progress improving     Pt denies pain. NAD noted. POC reviewed w pt and they verbalized understanding. Tele-SR     Pt free from falls, Pt ambulates to the bathroom. Bed in low position, side rails up x 2. Call light in reach,  and wheels locked. Will continue to monitor.

## 2019-03-11 NOTE — PLAN OF CARE
"Met with patient at bedside. Patient's phone # is [phone].  Patient reports she is currently homeless, states she "stays at a gas station in [place]".  Patient states she previously lived with her mother, brother, sister, and sibling's children. Patient talked to her mother, Ana Browne last night and requested she come to her home post hospitalization.  Requests information - homeless shelters. Denies PCP.  Pharmacy is [facility] [address] [facility].   Reports no transportation at this time.  Discharge plan is to go to mother's home, and reports if she cannot she will return to her previous living arrangements.      3/11/19 1310:  Provided patient with community resources for [facility], [facility] shelters, transportation, emergency hotel vouchers, public housing authorities, transitional housing and many further community resources. Patient has her cell phone at bedside, is appreciative, and states she will make some calls. Left contact information for question/concerns. Patient verbalized understanding.      03/11/19 1153   Discharge Assessment   Assessment Type Discharge Planning Assessment   Confirmed/corrected address and phone number on facesheet? Yes   Assessment information obtained from? Patient   Expected Length of Stay (days) 2   Communicated expected length of stay with patient/caregiver yes   Prior to hospitilization cognitive status: Alert/Oriented   Prior to hospitalization functional status: Independent   Current cognitive status: Alert/Oriented   Current Functional Status: Independent   Facility Arrived From: reports she is homeless   Lives With alone   Able to Return to Prior Arrangements yes   Is patient able to care for self after discharge? Yes   Who are your caregiver(s) and their phone number(s)? reports no caregiver- mother is Ana Browne    Patient's perception of discharge disposition homeless   Readmission Within the Last 30 Days no previous admission in " last 30 days   Patient currently being followed by outpatient case management? No   Patient currently receives any other outside agency services? No   Equipment Currently Used at Home none   Do you have any problems affording any of your prescribed medications? No   Is the patient taking medications as prescribed? yes   Does the patient have transportation home? No   Does the patient receive services at the [facility]? No   Discharge Plan A Home with family   Discharge Plan B Shelter   DME Needed Upon Discharge  none   Patient/Family in Agreement with Plan yes   Does the patient have transportation to healthcare appointments? No

## 2019-03-11 NOTE — ASSESSMENT & PLAN NOTE
Continue Prn Valium  Echocardiogram pending   Blood cultures x 2 no growth so far  Monitor for signs of withdrawal

## 2019-03-12 PROBLEM — R10.11 RIGHT UPPER QUADRANT ABDOMINAL PAIN: Status: ACTIVE | Noted: 2019-03-12

## 2019-03-12 LAB
ANION GAP SERPL CALC-SCNC: 6 MMOL/L
AORTIC ROOT ANNULUS: 3.3 CM
AORTIC VALVE CUSP SEPERATION: 2.33 CM
AV INDEX (PROSTH): 1.03
AV MEAN GRADIENT: 3.44 MMHG
AV PEAK GRADIENT: 6.45 MMHG
AV VALVE AREA: 3.25 CM2
AV VELOCITY RATIO: 0.84
BASOPHILS # BLD AUTO: 0 K/UL
BASOPHILS NFR BLD: 0.6 %
BSA FOR ECHO PROCEDURE: 1.74 M2
BUN SERPL-MCNC: 6 MG/DL
CALCIUM SERPL-MCNC: 8.1 MG/DL
CHLORIDE SERPL-SCNC: 112 MMOL/L
CO2 SERPL-SCNC: 23 MMOL/L
CREAT SERPL-MCNC: 0.7 MG/DL
CV ECHO LV RWT: 0.42 CM
DIFFERENTIAL METHOD: ABNORMAL
DOP CALC AO PEAK VEL: 1.27 M/S
DOP CALC AO VTI: 24.95 CM
DOP CALC LVOT AREA: 3.17 CM2
DOP CALC LVOT DIAMETER: 2.01 CM
DOP CALC LVOT PEAK VEL: 1.06 M/S
DOP CALC LVOT STROKE VOLUME: 81.19 CM3
DOP CALCLVOT PEAK VEL VTI: 25.6 CM
E WAVE DECELERATION TIME: 178.48 MSEC
E/A RATIO: 1.49
E/E' RATIO: 8.82
ECHO LV POSTERIOR WALL: 0.86 CM (ref 0.6–1.1)
EOSINOPHIL # BLD AUTO: 0.3 K/UL
EOSINOPHIL NFR BLD: 6.3 %
ERYTHROCYTE [DISTWIDTH] IN BLOOD BY AUTOMATED COUNT: 13.8 %
EST. GFR  (AFRICAN AMERICAN): >60 ML/MIN/1.73 M^2
EST. GFR  (NON AFRICAN AMERICAN): >60 ML/MIN/1.73 M^2
FRACTIONAL SHORTENING: 25 % (ref 28–44)
GLUCOSE SERPL-MCNC: 93 MG/DL
HCT VFR BLD AUTO: 32.7 %
HGB BLD-MCNC: 10.8 G/DL
INTERVENTRICULAR SEPTUM: 1.18 CM (ref 0.6–1.1)
IVRT: 0.1 MSEC
LEFT ATRIUM SIZE: 3.05 CM
LEFT INTERNAL DIMENSION IN SYSTOLE: 3.08 CM (ref 2.1–4)
LEFT VENTRICLE DIASTOLIC VOLUME INDEX: 44.07 ML/M2
LEFT VENTRICLE DIASTOLIC VOLUME: 74.71 ML
LEFT VENTRICLE MASS INDEX: 80.4 G/M2
LEFT VENTRICLE SYSTOLIC VOLUME INDEX: 22.1 ML/M2
LEFT VENTRICLE SYSTOLIC VOLUME: 37.47 ML
LEFT VENTRICULAR INTERNAL DIMENSION IN DIASTOLE: 4.11 CM (ref 3.5–6)
LEFT VENTRICULAR MASS: 136.37 G
LV LATERAL E/E' RATIO: 7.46
LV SEPTAL E/E' RATIO: 10.78
LYMPHOCYTES # BLD AUTO: 2 K/UL
LYMPHOCYTES NFR BLD: 37.7 %
MCH RBC QN AUTO: 30.3 PG
MCHC RBC AUTO-ENTMCNC: 32.9 G/DL
MCV RBC AUTO: 92 FL
MONOCYTES # BLD AUTO: 0.4 K/UL
MONOCYTES NFR BLD: 7.1 %
MV PEAK A VEL: 0.65 M/S
MV PEAK E VEL: 0.97 M/S
NEUTROPHILS # BLD AUTO: 2.6 K/UL
NEUTROPHILS NFR BLD: 48.3 %
PISA TR MAX VEL: 2.28 M/S
PLATELET # BLD AUTO: 171 K/UL
PMV BLD AUTO: 9.8 FL
POTASSIUM SERPL-SCNC: 4 MMOL/L
PULM VEIN A" WAVE DURATION": 83 MSEC
PV PEAK VELOCITY: 0.6 CM/S
RBC # BLD AUTO: 3.55 M/UL
RIGHT VENTRICULAR END-DIASTOLIC DIMENSION: 3.86 CM
SODIUM SERPL-SCNC: 141 MMOL/L
TDI LATERAL: 0.13
TDI SEPTAL: 0.09
TDI: 0.11
TR MAX PG: 20.79 MMHG
TRICUSPID ANNULAR PLANE SYSTOLIC EXCURSION: 2.99 CM
WBC # BLD AUTO: 5.3 K/UL

## 2019-03-12 PROCEDURE — 94640 AIRWAY INHALATION TREATMENT: CPT

## 2019-03-12 PROCEDURE — 36415 COLL VENOUS BLD VENIPUNCTURE: CPT

## 2019-03-12 PROCEDURE — 85025 COMPLETE CBC W/AUTO DIFF WBC: CPT

## 2019-03-12 PROCEDURE — 80048 BASIC METABOLIC PNL TOTAL CA: CPT

## 2019-03-12 PROCEDURE — 63600175 PHARM REV CODE 636 W HCPCS: Performed by: INTERNAL MEDICINE

## 2019-03-12 PROCEDURE — 25000003 PHARM REV CODE 250: Performed by: NURSE PRACTITIONER

## 2019-03-12 PROCEDURE — 94761 N-INVAS EAR/PLS OXIMETRY MLT: CPT

## 2019-03-12 PROCEDURE — S4991 NICOTINE PATCH NONLEGEND: HCPCS | Performed by: NURSE PRACTITIONER

## 2019-03-12 PROCEDURE — 25000003 PHARM REV CODE 250: Performed by: INTERNAL MEDICINE

## 2019-03-12 PROCEDURE — 99900035 HC TECH TIME PER 15 MIN (STAT)

## 2019-03-12 PROCEDURE — 12000002 HC ACUTE/MED SURGE SEMI-PRIVATE ROOM

## 2019-03-12 PROCEDURE — 25000242 PHARM REV CODE 250 ALT 637 W/ HCPCS: Performed by: HOSPITALIST

## 2019-03-12 PROCEDURE — 25000003 PHARM REV CODE 250: Performed by: HOSPITALIST

## 2019-03-12 PROCEDURE — 25000003 PHARM REV CODE 250: Performed by: EMERGENCY MEDICINE

## 2019-03-12 RX ORDER — LANOLIN ALCOHOL/MO/W.PET/CERES
800 CREAM (GRAM) TOPICAL
Status: DISCONTINUED | OUTPATIENT
Start: 2019-03-12 | End: 2019-03-13 | Stop reason: HOSPADM

## 2019-03-12 RX ORDER — POLYETHYLENE GLYCOL 3350 17 G/17G
17 POWDER, FOR SOLUTION ORAL DAILY
Status: DISCONTINUED | OUTPATIENT
Start: 2019-03-13 | End: 2019-03-13 | Stop reason: HOSPADM

## 2019-03-12 RX ORDER — DOCUSATE SODIUM 100 MG/1
100 CAPSULE, LIQUID FILLED ORAL DAILY
Status: DISCONTINUED | OUTPATIENT
Start: 2019-03-13 | End: 2019-03-13 | Stop reason: HOSPADM

## 2019-03-12 RX ORDER — POTASSIUM CHLORIDE 20 MEQ/15ML
40 SOLUTION ORAL
Status: DISCONTINUED | OUTPATIENT
Start: 2019-03-12 | End: 2019-03-13 | Stop reason: HOSPADM

## 2019-03-12 RX ORDER — AMOXICILLIN AND CLAVULANATE POTASSIUM 875; 125 MG/1; MG/1
1 TABLET, FILM COATED ORAL EVERY 12 HOURS
Qty: 10 TABLET | Refills: 0 | Status: SHIPPED | OUTPATIENT
Start: 2019-03-12 | End: 2019-03-17

## 2019-03-12 RX ORDER — POTASSIUM CHLORIDE 20 MEQ/15ML
60 SOLUTION ORAL
Status: DISCONTINUED | OUTPATIENT
Start: 2019-03-12 | End: 2019-03-13 | Stop reason: HOSPADM

## 2019-03-12 RX ADMIN — ALPRAZOLAM 1 MG: 1 TABLET ORAL at 08:03

## 2019-03-12 RX ADMIN — POTASSIUM CHLORIDE 40 MEQ: 20 SOLUTION ORAL at 05:03

## 2019-03-12 RX ADMIN — QUETIAPINE FUMARATE 150 MG: 100 TABLET ORAL at 08:03

## 2019-03-12 RX ADMIN — LEVOTHYROXINE SODIUM 175 MCG: 150 TABLET ORAL at 05:03

## 2019-03-12 RX ADMIN — IPRATROPIUM BROMIDE AND ALBUTEROL SULFATE 3 ML: .5; 3 SOLUTION RESPIRATORY (INHALATION) at 07:03

## 2019-03-12 RX ADMIN — IPRATROPIUM BROMIDE AND ALBUTEROL SULFATE 3 ML: .5; 3 SOLUTION RESPIRATORY (INHALATION) at 12:03

## 2019-03-12 RX ADMIN — NICOTINE 1 PATCH: 21 PATCH TRANSDERMAL at 09:03

## 2019-03-12 RX ADMIN — CEFTRIAXONE 1 G: 1 INJECTION, SOLUTION INTRAVENOUS at 02:03

## 2019-03-12 RX ADMIN — ONDANSETRON 4 MG: 2 INJECTION INTRAMUSCULAR; INTRAVENOUS at 01:03

## 2019-03-12 RX ADMIN — SODIUM CHLORIDE: 0.9 INJECTION, SOLUTION INTRAVENOUS at 06:03

## 2019-03-12 NOTE — ASSESSMENT & PLAN NOTE
Check CT of abdomen without contrast.  Clear liquids advance as tolerated.  Possible due to methamphetamine withdrawal however will evaluate for acute cholecystitis and other etiology for abdominal pain.

## 2019-03-12 NOTE — SUBJECTIVE & OBJECTIVE
Interval  History:  Patient with nausea and vomiting this morning and increased abdominal pain. Did not tolerate breakfast.  Reports pain all over.       Review of Systems   Reason unable to perform ROS:     Constitutional: Positive for activity change, appetite change, fatigue and fever.   Respiratory: Negative for cough and shortness of breath.    Cardiovascular: Negative for chest pain, palpitations and leg swelling.   Gastrointestinal: Positive for abdominal pain, nausea and vomiting. Negative for constipation and diarrhea.   Genitourinary: Negative for difficulty urinating, dysuria, flank pain and hematuria.   Musculoskeletal: Positive for arthralgias. Negative for gait problem.   Psychiatric/Behavioral: Negative for agitation, confusion, hallucinations and suicidal ideas. The patient is nervous/anxious.      Objective:     Vital Signs (Most Recent):  Temp: 97 °F (36.1 °C) (03/12/19 1540)  Pulse: 78 (03/12/19 1540)  Resp: 18 (03/12/19 1540)  BP: (!) 160/80 (03/12/19 1540)  SpO2: 99 % (03/12/19 1540) Vital Signs (24h Range):  Temp:  [96.6 °F (35.9 °C)-97.8 °F (36.6 °C)] 97 °F (36.1 °C)  Pulse:  [70-88] 78  Resp:  [16-19] 18  SpO2:  [95 %-99 %] 99 %  BP: ()/(56-80) 160/80     Weight: 70.3 kg (155 lb)  Body mass index is 29.29 kg/m².    Physical Exam   Constitutional: She is oriented to person, place, and time. She appears well-developed and well-nourished. No distress.       HENT:   Head: Normocephalic and atraumatic.   Eyes: Conjunctivae and EOM are normal. Pupils are equal, round, and reactive to light. Right eye exhibits no discharge. Left eye exhibits no discharge.   Neck: Normal range of motion. Neck supple. No JVD present.   Cardiovascular: Normal rate, normal heart sounds and intact distal pulses.   No murmur heard.      Pulmonary/Chest: Effort normal and breath sounds normal.       Abdominal: Soft. Bowel sounds are normal. She exhibits no distension. There is tenderness. There is guarding.   Right  upper quadrant abdominal tenderness   Genitourinary:   Genitourinary Comments: Not examined   Musculoskeletal: Normal range of motion. She exhibits no edema.   Neurological: She is alert and oriented to person, place, and time. No cranial nerve deficit.       Skin: Skin is warm and dry. Capillary refill takes less than 2 seconds. She is not diaphoretic.   Multiple venipuncture wounds to bilateral upper extremities and admits to injecting methamphetamines    Psychiatric: She has a normal mood and affect. Her behavior is normal. Judgment and thought content normal.         CRANIAL NERVES     CN III, IV, VI   Pupils are equal, round, and reactive to light.  Extraocular motions are normal.     Labs: Reviewed

## 2019-03-12 NOTE — PLAN OF CARE
Problem: Adult Inpatient Plan of Care  Goal: Plan of Care Review  Outcome: Ongoing (interventions implemented as appropriate)   03/12/19 5451   Plan of Care Review   Plan of Care Reviewed With patient   Progress improving   Pt c/o abd pain denying need for pain meds. NAD noted. POC reviewed w pt and they verbalized understanding. Tele- SR    Pt free from falls, Pt ambulates to the bathroom. Bed in low position, side rails up x 2. Call light in reach,  and wheels locked. Will continue to monitor.  Educated pt on need for stool and urine samples. Pt verbalized understanding.

## 2019-03-12 NOTE — UM SECONDARY REVIEW
Other (see comment)    Level of Care Issue    Noticed this morning patient has been changed to inpatient after I left yesterday. In morning rounds yesterday NP and MD were requesting inpatient. Secure chat was sent to NP for follow up on this patient and I recommended stay observation due to hypotension sys <90 not exceeding 12hrs (mcg criteria) and explained my reasoning to NP. NP stated ok to leave observation. Attending MD changed to inpatient last pm. Pt is being discharged today at less than 48hr. Clinicals have been faxed for review. kv

## 2019-03-12 NOTE — PLAN OF CARE
03/12/19 0948   Final Note   Assessment Type Final Discharge Note   Anticipated Discharge Disposition Home   Hospital Follow Up  Appt(s) scheduled? Yes

## 2019-03-12 NOTE — PROGRESS NOTES
[facility] Hospital Medicine  Progress Note    Patient Name: Corina Liu  MRN: [id number]  Patient Class: IP- Inpatient   Admission Date: 3/10/2019  Length of Stay: 1 days  Attending Physician: Juan Ryder MD  Primary Care Provider: Santosh Carlson MD        Subjective:     Principal Problem:Sepsis    HPI:  This is a 34 yo female who presents to the ED of a sore throat that started 1 week ago. She states that this sore throat has been worsening since onset 1 week ago. She relays that she has a subjective fever (1 day ago), intermittent stabbing back pain, a productive cough, and pressure with urination as well. She relays that 1 day ago she had some nausea and diarrhea as well. Patient states that she is having some body aches as well. Patient does endorse shooting up methamphetamines 1 day ago. She denies abdominal pain, vomiting, chest pain, and SOB. The patient has a PMHx of hypoglycemia, tachycardia, hepatitis C, and hyperthyroidism. The patient has a PSHx of tubal ligation, , and hysterectomy. She does admit to smoking 1 pack of cigarettes a day.  The patient was evaluated in the ER where she was noted to have Sepsis and pyelonephritis and placed in the hospital for further evaluation and treatment.         Hospital Course:  The patient was monitored closely during her stay.  She was given IV fluids for hydration.  A urine culture was sent to the lab.  The patient was started on IV Rocephin for her pyelonephritis and sepsis.  Blood cultures x2 were also done.  Her lactic acid level was within normal limits.  The patient was later noted to have some hypotension and received additional IV normal saline bolus. Due to her history of IV drug use, an echocardiogram was also ordered.     Interval  History:  Patient with nausea and vomiting this morning and increased abdominal pain. Did not tolerate breakfast.  Reports pain all over.       Review of Systems   Reason unable  to perform ROS:     Constitutional: Positive for activity change, appetite change, fatigue and fever.   Respiratory: Negative for cough and shortness of breath.    Cardiovascular: Negative for chest pain, palpitations and leg swelling.   Gastrointestinal: Positive for abdominal pain, nausea and vomiting. Negative for constipation and diarrhea.   Genitourinary: Negative for difficulty urinating, dysuria, flank pain and hematuria.   Musculoskeletal: Positive for arthralgias. Negative for gait problem.   Psychiatric/Behavioral: Negative for agitation, confusion, hallucinations and suicidal ideas. The patient is nervous/anxious.      Objective:     Vital Signs (Most Recent):  Temp: 97 °F (36.1 °C) (03/12/19 1540)  Pulse: 78 (03/12/19 1540)  Resp: 18 (03/12/19 1540)  BP: (!) 160/80 (03/12/19 1540)  SpO2: 99 % (03/12/19 1540) Vital Signs (24h Range):  Temp:  [96.6 °F (35.9 °C)-97.8 °F (36.6 °C)] 97 °F (36.1 °C)  Pulse:  [70-88] 78  Resp:  [16-19] 18  SpO2:  [95 %-99 %] 99 %  BP: ()/(56-80) 160/80     Weight: 70.3 kg (155 lb)  Body mass index is 29.29 kg/m².    Physical Exam   Constitutional: She is oriented to person, place, and time. She appears well-developed and well-nourished. No distress.       HENT:   Head: Normocephalic and atraumatic.   Eyes: Conjunctivae and EOM are normal. Pupils are equal, round, and reactive to light. Right eye exhibits no discharge. Left eye exhibits no discharge.   Neck: Normal range of motion. Neck supple. No JVD present.   Cardiovascular: Normal rate, normal heart sounds and intact distal pulses.   No murmur heard.      Pulmonary/Chest: Effort normal and breath sounds normal.       Abdominal: Soft. Bowel sounds are normal. She exhibits no distension. There is tenderness. There is guarding.   Right upper quadrant abdominal tenderness   Genitourinary:   Genitourinary Comments: Not examined   Musculoskeletal: Normal range of motion. She exhibits no edema.   Neurological: She is alert  and oriented to person, place, and time. No cranial nerve deficit.       Skin: Skin is warm and dry. Capillary refill takes less than 2 seconds. She is not diaphoretic.   Multiple venipuncture wounds to bilateral upper extremities and admits to injecting methamphetamines    Psychiatric: She has a normal mood and affect. Her behavior is normal. Judgment and thought content normal.         CRANIAL NERVES     CN III, IV, VI   Pupils are equal, round, and reactive to light.  Extraocular motions are normal.     Labs: Reviewed         Assessment/Plan:      * Sepsis    Pyelonephritis    This patient does have evidence of infective focus  My overall impression is sepsis.  Antibiotics given-   Antibiotics (From admission, onward)    Start     Stop Route Frequency Ordered    03/11/19 1500  cefTRIAXone (ROCEPHIN) 1 g in dextrose 5 % 50 mL IVPB      -- IV Every 24 hours (non-standard times) 03/10/19 1954          Latest lactate reviewed, they are-  Recent Labs   Lab 03/10/19  2000   LACTATE 0.7         Source- Urine  Source control Achieved by- Iv Abx    Blood cultures x 2 no growth so far    Urine culture pending    Continue Iv rocephin            Right upper quadrant abdominal pain    Check CT of abdomen without contrast.  Clear liquids advance as tolerated.  Possible due to methamphetamine withdrawal however will evaluate for acute cholecystitis and other etiology for abdominal pain.     Hypotension    Add IVF NS bolus this am x 1 liter       Anxiety disorder    Continue home medications  Continue  tid prn valium         COPD (chronic obstructive pulmonary disease)    Monitor O2 sats, supplemental O2 as needed  Continue  dennyonebs       Nicotine dependence    Smoking cessation education  Continue  nicotine patch       IV drug user    Continue Prn Valium  Echocardiogram pending   Blood cultures x 2 no growth so far  Monitor for signs of withdrawal       Thyroid disease    Continue home medication  TSH level noted- Patient  reports she hasn't been taking her home medication so will continue current home dose       Hepatitis C    Per History       Pyelonephritis    Initiated on IV Rocephin.  Urine culture pending         VTE Risk Mitigation (From admission, onward)        Ordered     IP VTE HIGH RISK PATIENT  Once      03/10/19 1954              Camelia Dahl NP  Department of Hospital Medicine   [facility]

## 2019-03-12 NOTE — PLAN OF CARE
03/11/19 1934   Patient Assessment/Suction   Level of Consciousness (AVPU) alert   Respiratory Effort Unlabored   Expansion/Accessory Muscles/Retractions no use of accessory muscles   All Lung Fields Breath Sounds clear;diminished   Rhythm/Pattern, Respiratory unlabored   Cough Frequency infrequent   Cough Type loose   PRE-TX-O2   O2 Device (Oxygen Therapy) room air   SpO2 99 %   Pulse Oximetry Type Intermittent   Pulse 78   Resp 16   Aerosol Therapy   $ Aerosol Therapy Charges Aerosol Treatment   Respiratory Treatment Status (SVN) given   Treatment Route (SVN) mask   Patient Position (SVN) HOB elevated   Post Treatment Assessment (SVN) breath sounds unchanged   Signs of Intolerance (SVN) none   Breath Sounds Post-Respiratory Treatment   Throughout All Fields Post-Treatment All Fields   Throughout All Fields Post-Treatment no change   Post-treatment Heart Rate (beats/min) 79   Post-treatment Resp Rate (breaths/min) 16

## 2019-03-12 NOTE — PLAN OF CARE
Scheduled hospital f/u with PCP office; placed on AVS.       03/12/19 3925   Discharge Reassessment   Assessment Type Discharge Planning Reassessment

## 2019-03-13 VITALS
OXYGEN SATURATION: 97 % | TEMPERATURE: 98 F | HEIGHT: 61 IN | SYSTOLIC BLOOD PRESSURE: 104 MMHG | RESPIRATION RATE: 19 BRPM | DIASTOLIC BLOOD PRESSURE: 61 MMHG | BODY MASS INDEX: 29.27 KG/M2 | WEIGHT: 155 LBS | HEART RATE: 87 BPM

## 2019-03-13 LAB — BACTERIA UR CULT: NORMAL

## 2019-03-13 PROCEDURE — 25000003 PHARM REV CODE 250: Performed by: NURSE PRACTITIONER

## 2019-03-13 PROCEDURE — 25000003 PHARM REV CODE 250: Performed by: EMERGENCY MEDICINE

## 2019-03-13 PROCEDURE — S4991 NICOTINE PATCH NONLEGEND: HCPCS | Performed by: NURSE PRACTITIONER

## 2019-03-13 PROCEDURE — 94761 N-INVAS EAR/PLS OXIMETRY MLT: CPT

## 2019-03-13 PROCEDURE — 94640 AIRWAY INHALATION TREATMENT: CPT

## 2019-03-13 PROCEDURE — 25000242 PHARM REV CODE 250 ALT 637 W/ HCPCS: Performed by: HOSPITALIST

## 2019-03-13 RX ADMIN — POLYETHYLENE GLYCOL 3350 17 G: 17 POWDER, FOR SOLUTION ORAL at 09:03

## 2019-03-13 RX ADMIN — NICOTINE 1 PATCH: 21 PATCH TRANSDERMAL at 09:03

## 2019-03-13 RX ADMIN — LEVOTHYROXINE SODIUM 175 MCG: 150 TABLET ORAL at 06:03

## 2019-03-13 RX ADMIN — DOCUSATE SODIUM 100 MG: 100 CAPSULE, LIQUID FILLED ORAL at 09:03

## 2019-03-13 RX ADMIN — IPRATROPIUM BROMIDE AND ALBUTEROL SULFATE 3 ML: .5; 3 SOLUTION RESPIRATORY (INHALATION) at 07:03

## 2019-03-13 RX ADMIN — IPRATROPIUM BROMIDE AND ALBUTEROL SULFATE 3 ML: .5; 3 SOLUTION RESPIRATORY (INHALATION) at 12:03

## 2019-03-13 NOTE — PLAN OF CARE
03/13/19 0923   Final Note   Assessment Type Final Discharge Note   Anticipated Discharge Disposition Home   Hospital Follow Up  Appt(s) scheduled? Yes

## 2019-03-13 NOTE — PLAN OF CARE
03/12/19 1931   Patient Assessment/Suction   Level of Consciousness (AVPU) alert   Respiratory Effort Unlabored   Expansion/Accessory Muscles/Retractions no use of accessory muscles   All Lung Fields Breath Sounds clear   PRE-TX-O2   O2 Device (Oxygen Therapy) room air   SpO2 100 %   Pulse Oximetry Type Intermittent   $ Pulse Oximetry - Multiple Charge Pulse Oximetry - Multiple   Pulse 74   Resp 18   Aerosol Therapy   $ Aerosol Therapy Charges Aerosol Treatment   Respiratory Treatment Status (SVN) given   Treatment Route (SVN) mask   Patient Position (SVN) HOB elevated   Signs of Intolerance (SVN) none   Breath Sounds Post-Respiratory Treatment   Throughout All Fields Post-Treatment All Fields   Throughout All Fields Post-Treatment no change   Post-treatment Heart Rate (beats/min) 78   Post-treatment Resp Rate (breaths/min) 16

## 2019-03-13 NOTE — PLAN OF CARE
03/13/19 0750   Patient Assessment/Suction   Level of Consciousness (AVPU) alert   Respiratory Effort Normal;Unlabored   Expansion/Accessory Muscles/Retractions no use of accessory muscles;no retractions   All Lung Fields Breath Sounds clear   Rhythm/Pattern, Respiratory unlabored;pattern regular;depth regular   PRE-TX-O2   O2 Device (Oxygen Therapy) room air   SpO2 97 %   Pulse Oximetry Type Intermittent   $ Pulse Oximetry - Multiple Charge Pulse Oximetry - Multiple   Pulse 87   Resp 19   Aerosol Therapy   $ Aerosol Therapy Charges Aerosol Treatment   Respiratory Treatment Status (SVN) given   Treatment Route (SVN) mask   Patient Position (SVN) HOB elevated   Post Treatment Assessment (SVN) breath sounds unchanged   Signs of Intolerance (SVN) none   Breath Sounds Post-Respiratory Treatment   Throughout All Fields Post-Treatment All Fields   Throughout All Fields Post-Treatment no change   Post-treatment Heart Rate (beats/min) 84   Post-treatment Resp Rate (breaths/min) 16

## 2019-03-13 NOTE — PLAN OF CARE
Problem: Adult Inpatient Plan of Care  Goal: Plan of Care Review  Outcome: Ongoing (interventions implemented as appropriate)   03/13/19 3974   Plan of Care Review   Plan of Care Reviewed With patient   Progress improving   Outcome Summary Uneventful night, Xanax given as per MAR for anxiety and sleep, no c/o pain, nausea or loose stools, remains SR 70-80s on tele, NAD

## 2019-03-14 ENCOUNTER — TELEPHONE (OUTPATIENT)
Dept: MEDSURG UNIT | Facility: HOSPITAL | Age: 34
End: 2019-03-14

## 2019-03-14 NOTE — DISCHARGE SUMMARY
[facility]  Hospital Medicine  Discharge Summary      Patient Name: Corina Liu  MRN: [id number]  Admission Date: 3/10/2019  Hospital Length of Stay: 2 days  Discharge Date and Time: 3/13/2019 10:35 AM  Attending Physician: Afia att. providers found   Discharging Provider: Camelia Dahl NP  Primary Care Provider: Santosh Carlson MD      HPI:   This is a 34 yo female who presents to the ED of a sore throat that started 1 week ago. She states that this sore throat has been worsening since onset 1 week ago. She relays that she has a subjective fever (1 day ago), intermittent stabbing back pain, a productive cough, and pressure with urination as well. She relays that 1 day ago she had some nausea and diarrhea as well. Patient states that she is having some body aches as well. Patient does endorse shooting up methamphetamines 1 day ago. She denies abdominal pain, vomiting, chest pain, and SOB. The patient has a PMHx of hypoglycemia, tachycardia, hepatitis C, and hyperthyroidism. The patient has a PSHx of tubal ligation, , and hysterectomy. She does admit to smoking 1 pack of cigarettes a day.  The patient was evaluated in the ER where she was noted to have Sepsis and pyelonephritis and placed in the hospital for further evaluation and treatment.         * No surgery found *      Hospital Course:   The patient was monitored closely during her stay.  She was given IV fluids for hydration.  A urine culture was sent to the lab.Urine culture +e coli. The patient was started on IV Rocephin for her pyelonephritis and sepsis.  Blood cultures x2 were also done.  Her lactic acid level was within normal limits.  The patient was later noted to have some hypotension and received additional IV normal saline bolus. Due to her history of IV drug use, an echocardiogram was also ordered. EF 60% with no vegetate growth. She reports increased abdominal pain and underwent CT abdomen which  demonstrated no acute findings, moderate stool in colon. She was given laxative with good results. She is feeling better and ambulating in halls. She is stable for DC.       PE: chest CTA heart RRR abd: soft NT     Consults:     No new Assessment & Plan notes have been filed under this hospital service since the last note was generated.  Service: Hospital Medicine    Final Active Diagnoses:    Diagnosis Date Noted POA    PRINCIPAL PROBLEM:  Sepsis [A41.9] 03/10/2019 Yes    Right upper quadrant abdominal pain [R10.11] 03/12/2019 No    Hypotension [I95.9] 03/11/2019 Yes    Pyelonephritis [N12] 03/10/2019 Yes    Thyroid disease [E07.9] 03/10/2019 Yes    IV drug user [F19.90] 03/10/2019 Yes    Nicotine dependence [F17.200] 03/10/2019 Yes    COPD (chronic obstructive pulmonary disease) [J44.9] 03/10/2019 Yes    Anxiety disorder [F41.9] 03/10/2019 Yes    Hepatitis C [B19.20] 08/01/2016 Yes      Problems Resolved During this Admission:       Discharged Condition: stable    Disposition: Home or Self Care    Follow Up:  Follow-up Information     Santosh Carlson MD On 3/20/2019.    Specialty:  Internal Medicine  Why:  hospital follow up @ 9:15  Contact information:  [address] [place] [address] [facility] [address] [phone]                 Patient Instructions:      Diet Adult Regular     Notify your health care provider if you experience any of the following:  redness, tenderness, or signs of infection (pain, swelling, redness, odor or green/yellow discharge around incision site)     Notify your health care provider if you experience any of the following:  persistent nausea and vomiting or diarrhea     Notify your health care provider if you experience any of the following:  severe uncontrolled pain     Notify your health care provider if you experience any of the following:  difficulty breathing or increased cough     Notify your health care provider if you experience any of the following:  severe persistent  headache     Notify your health care provider if you experience any of the following:  worsening rash     Notify your health care provider if you experience any of the following:  persistent dizziness, light-headedness, or visual disturbances     Notify your health care provider if you experience any of the following:  temperature >100.4     Activity as tolerated       Significant Diagnostic Studies: Labs: BMP: No results for input(s): GLU, NA, K, CL, CO2, BUN, CREATININE, CALCIUM, MG in the last 48 hours. and CMP No results for input(s): NA, K, CL, CO2, GLU, BUN, CREATININE, CALCIUM, PROT, ALBUMIN, BILITOT, ALKPHOS, AST, ALT, ANIONGAP, ESTGFRAFRICA, EGFRNONAA in the last 48 hours.  Radiology:   CT Abdomen Pelvis Without Contrast [[id number]] Resulted: 03/12/19 1705   Order Status: Completed Updated: 03/12/19 1708   Narrative:     EXAMINATION:  CT ABDOMEN PELVIS WITHOUT CONTRAST    CLINICAL HISTORY:  Abdominal pain, unspecified;Abd pain, gastroenteritis or colitis suspected;    TECHNIQUE:  Low dose axial images, sagittal and coronal reformations were obtained from the lung bases to the pubic symphysis.  No p.o. or IV contrast    COMPARISON:  9/29/2013    FINDINGS:  There is mild posterior bibasilar atelectatic stranding and platelike atelectasis    Liver, gallbladder, bile ducts; unremarkable appearance of the liver.  No calcified stones in the gallbladder or CT findings of acute cholecystitis.  No biliary duct dilatation    Spleen; unremarkable appearance    Pancreas; unremarkable in appearance    Adrenal glands; unremarkable in appearance    Abdominal aorta; no aneurysm    Kidneys, ureters, bladder; mildly prominent left renal pelvis which is extrarenal location and not significantly changed compared to the prior exam.  No dilatation of intrarenal collecting structures.  No opaque renal or ureteral stone or ureteral obstruction.  Urinary bladder mildly distended at time of the exam and unremarkable  appearance    Reproductive organs; prior hysterectomy.  Adnexal region unremarkable appearance    Stomach, bowel, mesentery; small amount of free fluid the pelvis nonspecific but not more so than can be seen on a physiologic basis.  No free intraperitoneal air.  Mildly prominent amount of stool within the colon.  The appendix is not seen with certainty although segment of normal appearing appendix is suggested and no abnormal appendix inflammatory changes appendiceal region is seen.  Prominent amount of fluid or secretions within the stomach.  No appreciable bowel wall thickening or inflammatory change    Osseous structures; SI joint sclerosis.  Degenerative changes.   Impression:       Findings as detailed above including small amount of free fluid the pelvis nonspecific but not more so than can be seen on a physiologic basis.  Prominent amount of stool within colon and food or secretions within the stomach.  Mild posterior bibasilar strandy and platelike atelectasis.      Electronically signed by: Shirley Rendon MD  Date: 03/12/2019  Time: 17:05   US Retroperitoneal Complete (Kidney and [[id number]] Resulted: 03/11/19 0855   Order Status: Completed Updated: 03/11/19 0857   Narrative:     EXAMINATION:  US RETROPERITONEAL COMPLETE    CLINICAL HISTORY:  sepsis, pyelonephritis;    TECHNIQUE:  Ultrasound of the kidneys and urinary bladder was performed including color flow and Doppler evaluation of the kidneys.    COMPARISON:  None.    FINDINGS:  The right kidney measures 10.4 cm and the left 11.5 cm.  The resistive indices are within normal limits.  No stone, mass, or hydronephrosis.  The urinary bladder is decompressed and unremarkable.   Impression:       Normal appearance of the kidneys.      Electronically signed by: Deric Butler MD  Date: 03/11/2019  Time: 08:55   X-Ray Chest PA And Lateral [[id number]] Resulted: 03/10/19 1433   Order Status: Completed Updated: 03/10/19 1431   Narrative:     EXAMINATION:  XR  CHEST PA AND LATERAL    CLINICAL HISTORY:  Cough    TECHNIQUE:  PA and lateral views of the chest were performed.    COMPARISON:  Chest x-ray of February 7, 2018.    FINDINGS:  The lungs are clear, with normal appearance of pulmonary vasculature and no pleural effusion or pneumothorax.    The cardiac silhouette is normal in size. The hilar and mediastinal contours are unremarkable.    Bones are intact.   Impression:       No acute abnormality.      Electronically signed by: Jorge Ardon MD  Date: 03/10/2019  Time: 14:33         Pending Diagnostic Studies:     Procedure Component Value Units Date/Time    EKG 12-lead [[id number]] Collected:  03/10/19 1457    Order Status:  Sent Lab Status:  In process Updated:  03/11/19 1411    Narrative:       Test Reason : I10    Vent. Rate : 099 BPM     Atrial Rate : 099 BPM     P-R Int : 106 ms          QRS Dur : 090 ms      QT Int : 358 ms       P-R-T Axes : 037 077 079 degrees     QTc Int : 459 ms    Sinus rhythm with short GA  Possible Lateral infarct ,age undetermined  Abnormal ECG    Referred By: [email or web address]   SELF           Confirmed By:          Medications:  Reconciled Home Medications:      Medication List      START taking these medications    amoxicillin-clavulanate 875-125mg 875-125 mg per tablet  Commonly known as:  AUGMENTIN  Take 1 tablet by mouth every 12 (twelve) hours. for 5 days        CONTINUE taking these medications    albuterol-ipratropium 2.5 mg-0.5 mg/3 mL nebulizer solution  Commonly known as:  DUO-NEB  Take 3 mLs by nebulization every 4 (four) hours. Rescue     ALPRAZolam 1 MG tablet  Commonly known as:  XANAX  Take 1 tablet (1 mg total) by mouth nightly as needed for Anxiety.     diclofenac 75 MG EC tablet  Commonly known as:  VOLTAREN  Take 1 tablet (75 mg total) by mouth 2 (two) times daily as needed.     glucagon (human recombinant) 1 mg Kit  Commonly known as:  GLUCAGON EMERGENCY KIT (HUMAN)  Inject as per directions IM     ibuprofen 800 MG  tablet  Commonly known as:  ADVIL,MOTRIN  Take 1 tablet (800 mg total) by mouth every 6 (six) hours as needed for Pain.     levothyroxine 175 MCG tablet  Commonly known as:  SYNTHROID, LEVOTHROID  Take 1 tablet (175 mcg total) by mouth once daily.     lidocaine 5 %  Commonly known as:  LIDODERM  Place 1 patch onto the skin once daily. Remove & Discard patch within 12 hours or as directed by MD     potassium chloride SA 20 MEQ tablet  Commonly known as:  K-DUR,KLOR-CON  Take 1 tablet (20 mEq total) by mouth once daily.     QUEtiapine 200 MG Tab  Commonly known as:  SEROQUEL  Take by mouth.     traZODone 150 MG tablet  Commonly known as:  DESYREL  Take 150 mg by mouth every evening.            Indwelling Lines/Drains at time of discharge:   Lines/Drains/Airways          None          Time spent on the discharge of patient: 35 minutes  Patient was seen and examined on the date of discharge and determined to be suitable for discharge.         Camelia Dahl NP  Department of Hospital Medicine  [facility]

## 2019-03-16 LAB
BACTERIA BLD CULT: NORMAL
BACTERIA BLD CULT: NORMAL

## 2019-03-17 NOTE — PHYSICIAN QUERY
"PT Name: Corina Liu  MR #: [id number]    Physician Query Form - Cause and Effect Relationship Clarification      CDS: Anju Russ RN, CCDS         Contact information :ext [phone]  [email or web address]       This form is a permanent document in the medical record.     Query Date: March 17, 2019    By submitting this query, we are merely seeking further clarification of documentation. Please utilize your independent clinical judgment when addressing the question(s) below.    The Medical record contains the following:  Supporting Clinical Findings   Location in record            "sepsis"  Pyelonephritis  This patient does have evidence of infective focus  My overall impression is sepsis.  Antibiotics given-"     ESCHERICHIA COLI   >100,000 cfu/ml                                                                  "urine culture was sent to the lab.Urine culture +e coli. The patient was started on IV Rocephin for her pyelonephritis and sepsis."                                            H&P 3/10/19              Lab 3/101/19        Discharge summary 3/10/19                                             Provider, please clarify if there is any correlation between Sepsis  and ESCHERICHIA COLI .           Are the conditions:      [ x ] Due to or associated with each other   [  ] Unrelated to each other   [  ] Other (Please Specify): _________________________   [  ] Clinically Undetermined                                                                                                                                                                                             "

## 2019-03-17 NOTE — PHYSICIAN QUERY
"PT Name: Corina Liu  MR #: [id number]     PHYSICIAN QUERY -  ACUITY OF CONDITION CLARIFICATION      CDS: Anju Russ RN, CCDS         Contact information :ext [phone]  [email or web address]     This form is a permanent document in the medical record.     Query Date: March 16, 2019    By submitting this query, we are merely seeking further clarification of documentation to reflect the severity of illness of your patient. Please utilize your independent clinical judgment when addressing the question(s) below.    The Medical record reflects the following:     Indicators   Supporting Clinical Findings Location in Medical Record   x Documentation of condition "Pyelonephritis" H&P 3/10/19   x Lab Value(s) Urine culture  ESCHERICHIA COLI   >100,000 cfu/ml  Lab 3/10/19    Radiology Findings     x Treatment                                 Medication Continue Iv rocephin  Add IV bolus then followed by IVF H&P 3/10/19   x Other "sepsis  Source-urine" H&P 3/10/19     Provider, please specify the acuity/chronicity of Pyelonephritis :    [x   ] Acute   [   ] Chronic   [   ] Subacute   [   ] Acute and/on chronic   [   ] Past history only, not a current diagnosis   [   ] Ruled Out   [   ]  Clinically Undetermined       Please document in your progress notes daily for the duration of treatment until resolved, and include in your discharge summary.  "

## 2019-03-17 NOTE — PHYSICIAN QUERY
"PT Name: Corina Liu  MR #: [id number]    Physician Query Form - Cause and Effect Relationship Clarification      CDS: Anju Russ RN, CCDS         Contact information :ext [phone]  [email or web address]       This form is a permanent document in the medical record.     Query Date: March 16, 2019    By submitting this query, we are merely seeking further clarification of documentation. Please utilize your independent clinical judgment when addressing the question(s) below.    The Medical record contains the following:  Supporting Clinical Findings   Location in record            "sepsis"  Pyelonephritis  This patient does have evidence of infective focus  My overall impression is sepsis.  Antibiotics given-"     ESCHERICHIA COLI   >100,000 cfu/ml                                                                  "urine culture was sent to the lab.Urine culture +e coli. The patient was started on IV Rocephin for her pyelonephritis and sepsis."                                            H&P 3/10/19              Lab 3/101/19        Discharge summary 3/10/19                                             Provider, please clarify if there is any correlation between Sepsis  and Pyelonephritis.           Are the conditions:      [ x ] Due to or associated with each other   [  ] Unrelated to each other   [  ] Other (Please Specify): _________________________   [  ] Clinically Undetermined                                                                                                                                                                                             "

## 2019-05-17 ENCOUNTER — CLINICAL SUPPORT (OUTPATIENT)
Dept: URGENT CARE | Facility: CLINIC | Age: 34
End: 2019-05-17
Payer: MEDICAID

## 2019-05-17 VITALS
SYSTOLIC BLOOD PRESSURE: 113 MMHG | BODY MASS INDEX: 35.12 KG/M2 | TEMPERATURE: 98 F | DIASTOLIC BLOOD PRESSURE: 76 MMHG | RESPIRATION RATE: 16 BRPM | WEIGHT: 186 LBS | HEIGHT: 61 IN | OXYGEN SATURATION: 97 % | HEART RATE: 84 BPM

## 2019-05-17 DIAGNOSIS — J00 ACUTE NASOPHARYNGITIS: ICD-10-CM

## 2019-05-17 DIAGNOSIS — Z20.2 STD EXPOSURE: ICD-10-CM

## 2019-05-17 DIAGNOSIS — N89.8 VAGINAL DISCHARGE: Primary | ICD-10-CM

## 2019-05-17 LAB
B-HCG UR QL: NEGATIVE
BILIRUB UR QL STRIP: NEGATIVE
CTP QC/QA: YES
GLUCOSE UR QL STRIP: NEGATIVE
KETONES UR QL STRIP: NEGATIVE
LEUKOCYTE ESTERASE UR QL STRIP: NEGATIVE
PH, POC UA: 6
POC BLOOD, URINE: NEGATIVE
POC NITRATES, URINE: NEGATIVE
PROT UR QL STRIP: NEGATIVE
SP GR UR STRIP: 1.02 (ref 1–1.03)
UROBILINOGEN UR STRIP-ACNC: NORMAL (ref 0.1–1.1)

## 2019-05-17 PROCEDURE — 99214 OFFICE O/P EST MOD 30 MIN: CPT | Mod: 25,S$GLB,, | Performed by: NURSE PRACTITIONER

## 2019-05-17 PROCEDURE — 99214 PR OFFICE/OUTPT VISIT, EST, LEVL IV, 30-39 MIN: ICD-10-PCS | Mod: 25,S$GLB,, | Performed by: NURSE PRACTITIONER

## 2019-05-17 PROCEDURE — 81003 POCT URINALYSIS, DIPSTICK, AUTOMATED, W/O SCOPE: ICD-10-PCS | Mod: QW,S$GLB,, | Performed by: NURSE PRACTITIONER

## 2019-05-17 PROCEDURE — 81025 POCT URINE PREGNANCY: ICD-10-PCS | Mod: S$GLB,,, | Performed by: NURSE PRACTITIONER

## 2019-05-17 PROCEDURE — 81025 URINE PREGNANCY TEST: CPT | Mod: S$GLB,,, | Performed by: NURSE PRACTITIONER

## 2019-05-17 PROCEDURE — 81003 URINALYSIS AUTO W/O SCOPE: CPT | Mod: QW,S$GLB,, | Performed by: NURSE PRACTITIONER

## 2019-05-17 RX ORDER — FLUCONAZOLE 150 MG/1
150 TABLET ORAL WEEKLY
Qty: 3 TABLET | Refills: 0 | Status: SHIPPED | OUTPATIENT
Start: 2019-05-17 | End: 2019-06-07

## 2019-05-17 RX ORDER — CEFTRIAXONE 1 G/1
1 INJECTION, POWDER, FOR SOLUTION INTRAMUSCULAR; INTRAVENOUS
Status: COMPLETED | OUTPATIENT
Start: 2019-05-17 | End: 2019-05-17

## 2019-05-17 RX ORDER — DEXAMETHASONE SODIUM PHOSPHATE 4 MG/ML
8 INJECTION, SOLUTION INTRA-ARTICULAR; INTRALESIONAL; INTRAMUSCULAR; INTRAVENOUS; SOFT TISSUE
Status: COMPLETED | OUTPATIENT
Start: 2019-05-17 | End: 2019-05-17

## 2019-05-17 RX ORDER — FLUTICASONE PROPIONATE 50 MCG
2 SPRAY, SUSPENSION (ML) NASAL DAILY
Qty: 15.8 ML | Refills: 0 | Status: SHIPPED | OUTPATIENT
Start: 2019-05-17 | End: 2020-11-23

## 2019-05-17 RX ORDER — AZITHROMYCIN 250 MG/1
TABLET, FILM COATED ORAL
Qty: 6 TABLET | Refills: 0 | Status: SHIPPED | OUTPATIENT
Start: 2019-05-17 | End: 2020-11-23

## 2019-05-17 RX ORDER — CETIRIZINE HYDROCHLORIDE 10 MG/1
10 TABLET ORAL DAILY
Qty: 30 TABLET | Refills: 0 | Status: SHIPPED | OUTPATIENT
Start: 2019-05-17 | End: 2019-06-16

## 2019-05-17 RX ADMIN — DEXAMETHASONE SODIUM PHOSPHATE 8 MG: 4 INJECTION, SOLUTION INTRA-ARTICULAR; INTRALESIONAL; INTRAMUSCULAR; INTRAVENOUS; SOFT TISSUE at 12:05

## 2019-05-17 RX ADMIN — CEFTRIAXONE 1 G: 1 INJECTION, POWDER, FOR SOLUTION INTRAMUSCULAR; INTRAVENOUS at 12:05

## 2019-05-17 NOTE — PROGRESS NOTES
"Subjective:       Patient ID: Corina Liu is a 33 y.o. female.    Vitals:  height is 5' 1" (1.549 m) and weight is 84.4 kg (186 lb). Her oral temperature is 97.9 °F (36.6 °C). Her blood pressure is 113/76 and her pulse is 84. Her respiration is 16 and oxygen saturation is 97%.     Chief Complaint: Cough    Patient complains of watery eyes, sneezing, sinus congestion and facial pressure that began this morning. Also complains of a nonproductive cough for 1 week. Patient is a 1 pack/day smoker. Patient also reports "I think I was exposed to chlamydia." States she is having white thick vaginal discharge for 1 week. Denies vaginal lesions, bleeding. Denies fever, sob, chest pain, nausea, vomiting, diarrhea.     Cough   This is a new problem. The current episode started in the past 7 days. The problem has been gradually worsening. The problem occurs constantly. The cough is productive of sputum. Pertinent negatives include no chest pain, chills, fever, headaches, myalgias, rash, sore throat or shortness of breath. Nothing aggravates the symptoms. She has tried nothing for the symptoms. The treatment provided no relief.       Constitution: Negative for chills, fatigue and fever.   HENT: Positive for congestion, sinus pain and sinus pressure. Negative for sore throat.    Neck: Negative for painful lymph nodes.   Cardiovascular: Negative for chest pain and leg swelling.   Eyes: Negative for double vision and blurred vision.   Respiratory: Positive for cough. Negative for sputum production and shortness of breath.    Gastrointestinal: Negative for abdominal pain, nausea, vomiting and diarrhea.   Endocrine: negative.   Genitourinary: Negative for dysuria, frequency, urgency and history of kidney stones.   Musculoskeletal: Negative for joint pain, joint swelling, muscle cramps and muscle ache.   Skin: Negative for color change, pale, rash and bruising.   Allergic/Immunologic: Negative for seasonal allergies. "   Neurological: Negative for dizziness, history of vertigo, light-headedness, passing out and headaches.   Hematologic/Lymphatic: Negative for swollen lymph nodes.   Psychiatric/Behavioral: Negative for nervous/anxious, sleep disturbance and depression. The patient is not nervous/anxious.        Objective:      Physical Exam   Constitutional: She is oriented to person, place, and time. Vital signs are normal. She appears well-developed and well-nourished. She is cooperative.  Non-toxic appearance. She does not have a sickly appearance. She does not appear ill. No distress.   HENT:   Head: Normocephalic and atraumatic.   Right Ear: Hearing, tympanic membrane, external ear and ear canal normal.   Left Ear: Hearing, tympanic membrane, external ear and ear canal normal.   Nose: Mucosal edema and rhinorrhea present. No nasal deformity. No epistaxis. Right sinus exhibits no maxillary sinus tenderness and no frontal sinus tenderness. Left sinus exhibits no maxillary sinus tenderness and no frontal sinus tenderness.   Mouth/Throat: Uvula is midline, oropharynx is clear and moist and mucous membranes are normal. No trismus in the jaw. Normal dentition. No uvula swelling. No posterior oropharyngeal erythema.   Eyes: Conjunctivae and lids are normal. Right eye exhibits no discharge. Left eye exhibits no discharge. No scleral icterus.   Sclera clear bilat   Neck: Trachea normal, normal range of motion, full passive range of motion without pain and phonation normal. Neck supple.   Cardiovascular: Normal rate, regular rhythm, normal heart sounds, intact distal pulses and normal pulses.   Pulmonary/Chest: Effort normal and breath sounds normal. No respiratory distress.   Abdominal: Soft. Normal appearance and bowel sounds are normal. She exhibits no distension, no pulsatile midline mass and no mass. There is no tenderness.   Musculoskeletal: Normal range of motion. She exhibits no edema or deformity.   Neurological: She is alert  and oriented to person, place, and time. She exhibits normal muscle tone. Coordination normal. GCS eye subscore is 4. GCS verbal subscore is 5. GCS motor subscore is 6.   Skin: Skin is warm, dry and intact. No rash noted. She is not diaphoretic. No pallor.   Psychiatric: She has a normal mood and affect. Her speech is normal and behavior is normal. Judgment and thought content normal. Cognition and memory are normal.   Nursing note and vitals reviewed.      Assessment:       1. Vaginal discharge    2. Acute nasopharyngitis    3. STD exposure        Plan:         UA negative. UPT negative.   After complete evaluation, including thorough history and physical exam, the patient's symptoms are most likely due to viral upper respiratory infection. There are no concerning features on physical exam to suggest bacterial otitis media/externa, sinusitis, pharyngitis, or peritonsillar abscess. Vital signs do not suggest sepsis. Lung sounds are clear and not consistent with pneumonia. There is no neck pain or limited ROM to suggest retropharyngeal abscess or meningitis. The patient will be treated with supportive care. Will provide RX for zyrtec and flonase upon D/C.     Symptoms began today and physical exam was unremarkable, I do not feel the patient needs antibiotics at this time.     After careful evaluation of the patient's physical exam and history of present illness, I believe the patient is at risk for a sexually transmitted disease due to either possible exposure for known exposure.  I will treat the patient with rocephin in the clinic.  I discussed abstaining from sex ×2 weeks, and must inform all sexual partners of possible STD exposure.  Instructed patient to follow up with their primary care, and I provided an STD clinic contact information for continued evaluation.  Patient was allowed to ask questions, and verbalizes understanding    Advised patient:  Complete all your antibiotics. Refrain from having sex for 2  weeks. We will call you with the results of your labs, if anything is positive, your partner(s) must be treated.   You were not tested today for life threatening sexually transmitted diseases, such as HIV and Syphilis.  For further evaluation: Follow up at                      [facility]                    [facility]                    [address] [place] [id number]                    [phone]      Vaginal discharge  -     POCT Urinalysis, Dipstick, Automated, W/O Scope  -     POCT urine pregnancy  -     NuSwab Vaginitis Plus (VG+)    Acute nasopharyngitis    STD exposure    Other orders  -     dexamethasone injection 8 mg  -     cefTRIAXone injection 1 g  -     azithromycin (ZITHROMAX) 250 MG tablet; Take 2 tablets (500 mg) on  Day 1,  followed by 1 tablet (250 mg) once daily on Days 2 through 5.  Dispense: 6 tablet; Refill: 0  -     cetirizine (ZYRTEC) 10 MG tablet; Take 1 tablet (10 mg total) by mouth once daily.  Dispense: 30 tablet; Refill: 0  -     fluticasone propionate (FLONASE) 50 mcg/actuation nasal spray; 2 sprays (100 mcg total) by Each Nare route once daily.  Dispense: 15.8 mL; Refill: 0  -     dexchlorphen-phenylephrine-DM (POLYTUSSIN DM) 1-5-10 mg/5 mL Syrp; Take 5 mLs by mouth every 4 (four) hours as needed.  Dispense: 120 mL; Refill: 0  -     fluconazole (DIFLUCAN) 150 MG Tab; Take 1 tablet (150 mg total) by mouth once a week. for 21 days  Dispense: 3 tablet; Refill: 0

## 2019-05-17 NOTE — PATIENT INSTRUCTIONS
Complete all your antibiotics. Refrain from having sex for 2 weeks. We will call you with the results of your labs, if anything is positive, your partner(s) must be treated.   DO NOT drink alcohol while taking Metronidazole (Flagyl).  You were not tested today for life threatening sexually transmitted diseases, such as HIV and Syphilis.  For further evaluation: Follow up at                      [facility]                    [facility]                    [address] [place] [id number] [phone]      Adult Self-Care for Colds  Colds are caused by viruses. They can't be cured with antibiotics. However, you can ease symptoms and support your body's efforts to heal itself.  No matter which symptoms you have, be sure to:  · Drink plenty of fluids (water or clear soup)  · Stop smoking and drinking alcohol  · Get plenty of rest    Understand a fever  · Take your temperature several times a day. If your fever is 100.4°F (38.0°C) for more than a day, call your healthcare provider.  · Relax, lie down. Go to bed if you want. Just get off your feet and rest. Also, drink plenty of fluids to avoid dehydration.  · Take acetaminophen or a nonsteroidal anti-inflammatory agent (NSAID), such as ibuprofen.  Treat a troubled nose kindly  · Breathe steam or heated humidified air to open blocked nasal passages.  a hot shower or use a vaporizer. Be careful not to get burned by the steam.  · Saline nasal sprays and decongestant tablets help open a stuffy nose. Antihistamines can also help, but they can cause side effects such as drowsiness and drying of the eyes, nose, and mouth.  Soothe a sore throat and cough  · Gargle every 2 hours with 1/4 teaspoon of salt dissolved in 1/2 cup of warm water. Suck on throat lozenges and cough drops to moisten your throat.  · Cough medicines are available but it is unclear how well they  actually work.  · Take acetaminophen or an NSAID, such as ibuprofen, to ease throat pain  Ease digestive problems  · Put fluids back into your body. Take frequent sips of clear liquids such as water or broth. Avoid drinks that have a lot of sugar in them, such as juices and sodas. These can make diarrhea worse. Older children and adults can drink sports drinks.  · As your appetite returns, you can resume your normal diet. Ask your healthcare provider if there are any foods you should avoid.  When to seek medical care  When you first notice symptoms, ask your healthcare provider if antiviral medicines are appropriate. Antibiotics should not be taken for colds or flu. Also, call your healthcare provider if you have any of the following symptoms or if you aren't feeling better after 7 days:  · Shortness of breath  · Pain or pressure in the chest or belly (abdomen)  · Worsening symptoms, especially after a period of improvement  · Fever of 100.4°F  (38.0°C) or higher, or fever that doesn't go down with medicine  · Sudden dizziness or confusion  · Severe or continued vomiting  · Signs of dehydration, including extreme thirst, dark urine, infrequent urination, dry mouth  · Spotted, red, or very sore throat   Date Last Reviewed: 12/1/2016  © 1264-7334 [employer]. [address] [place] [address], [place] [address]. All rights reserved. This information is not intended as a substitute for professional medical care. Always follow your healthcare professional's instructions.        Viral Upper Respiratory Illness (Adult)  You have a viral upper respiratory illness (URI), which is another term for the common cold. This illness is contagious during the first few days. It is spread through the air by coughing and sneezing. It may also be spread by direct contact (touching the sick person and then touching your own eyes, nose, or mouth). Frequent handwashing will decrease risk of spread. Most viral illnesses go away within 7  to 10 days with rest and simple home remedies. Sometimes the illness may last for several weeks. Antibiotics will not kill a virus, and they are generally not prescribed for this condition.    Home care  · If symptoms are severe, rest at home for the first 2 to 3 days. When you resume activity, don't let yourself get too tired.  · Avoid being exposed to cigarette smoke (yours or others).  · You may use acetaminophen or ibuprofen to control pain and fever, unless another medicine was prescribed. (Note: If you have chronic liver or kidney disease, have ever had a stomach ulcer or gastrointestinal bleeding, or are taking blood-thinning medicines, talk with your healthcare provider before using these medicines.) Aspirin should never be given to anyone under 18 years of age who is ill with a viral infection or fever. It may cause severe liver or brain damage.  · Your appetite may be poor, so a light diet is fine. Avoid dehydration by drinking 6 to 8 glasses of fluids per day (water, soft drinks, juices, tea, or soup). Extra fluids will help loosen secretions in the nose and lungs.  · Over-the-counter cold medicines will not shorten the length of time youre sick, but they may be helpful for the following symptoms: cough, sore throat, and nasal and sinus congestion. (Note: Do not use decongestants if you have high blood pressure.)  Follow-up care  Follow up with your healthcare provider, or as advised.  When to seek medical advice  Call your healthcare provider right away if any of these occur:  · Cough with lots of colored sputum (mucus)  · Severe headache; face, neck, or ear pain  · Difficulty swallowing due to throat pain  · Fever of 100.4°F (38°C)  Call 911, or get immediate medical care  Call emergency services right away if any of these occur:  · Chest pain, shortness of breath, wheezing, or difficulty breathing  · Coughing up blood  · Inability to swallow due to throat pain  Date Last Reviewed: 9/13/2015  ©  4690-1033 [employer]. [address] [place] [address], [place] [address]. All rights reserved. This information is not intended as a substitute for professional medical care. Always follow your healthcare professional's instructions.        Understanding STDs    When it comes to sex, nothing is risk-free. Any sexual contact with the penis, vagina, anus, or mouth can spread a sexually transmitted disease (STD). The only sure way to prevent STDs is abstinence (not having sex). But there are ways to make sex safer. Use a latex condom each time you have sex. And choose your partner wisely.  Use condoms for safer sex  If you have sex, latex condoms provide the best protection against STDs. Latex condoms stop the exchange of body fluids that carry certain STDs. They also limit contact with affected skin. Be aware though, a condom doesnt cover all skin. So, affected skin that is not covered can still transfer disease. But youre safer with a condom than without one. Use a condom even if you use other birth control. While birth control methods like the pill or IUD help prevent pregnancy, they do not protect against STDs.  Choose the right condom  Condoms made of latex prevent disease best. If youre allergic to latex, use polyurethane condoms instead. Male condoms fit over the penis. Female condoms line the vagina. Before buying a condom, read the label to be sure it prevents disease. Some novelty condoms dont.  The right lubricant helps  Buy lubricated condoms or use lubricant. This provides greater comfort and reduces the risk of condom breakage. Use only water-based lubricants. Dont use oil, lotion, or petroleum jelly. They can weaken the condom, causing breakage. Also, you may want to choose lubricants without nonoxynol-9. Its now known that this spermicide does not prevent disease and may cause irritation.  Use condoms correctly  For condoms to work, they must be used the right way. Keep these tips in  mind:  · Use a new latex condom each time you have sex. Slip the condom on the penis before any contact is made.  · When ready to withdraw, hold the rim of the condom as the penis pulls out. This prevents the condom from slipping off.  · Check the expiration date before using a condom.  · Dont store condoms in places that can get hot, such as a car or a wallet that is carried in a back pocket.  Get to know your partner  Safer sex is a process. It involves getting to know your partner and making informed choices. Ask each other how many partners you have had in the past, and how many you have now. Find out if either of you has an STD. If you decide to have sex, use a condom each time. Dont stop using condoms unless youre sure neither of you has other partners and youve both been tested to confirm you dont have STDs. Then stay free of disease by having sex only with each other (monogamy).  Keep your cool  Dont let alcohol or drugs cloud your judgment. They could lead you to have sex with someone you wouldnt have chosen if you were sober. Or, you might forget to use a condom. If you do plan to have sex, keep a latex condom with you. Dont wait until youre in the heat of passion to try to find one.  Consider abstinence  The only way to be sure you wont get an STD is to abstain from sex. Abstinence is a choice that many people make at some point in their lives. Maybe you want to wait until you are sure youre ready before you have sex. Maybe youd like a break from the responsibilities of sex for a while. Or maybe you just want to know your partner better before taking the next step. Abstinence is a choice you can make now to protect your future.  Date Last Reviewed: 12/1/2016  © 9065-4896 [employer]. [address] [place] [address], [place] [address]. All rights reserved. This information is not intended as a substitute for professional medical care. Always follow your healthcare professional's  instructions.

## 2019-05-21 LAB
A VAGINAE DNA VAG QL NAA+PROBE: ABNORMAL SCORE
BVAB2 DNA VAG QL NAA+PROBE: ABNORMAL SCORE
C ALBICANS DNA VAG QL NAA+PROBE: NEGATIVE
C GLABRATA DNA VAG QL NAA+PROBE: NEGATIVE
C TRACH RRNA SPEC QL NAA+PROBE: NEGATIVE
MEGA1 DNA VAG QL NAA+PROBE: ABNORMAL SCORE
N GONORRHOEA RRNA SPEC QL NAA+PROBE: NEGATIVE
T VAGINALIS RRNA SPEC QL NAA+PROBE: POSITIVE

## 2019-05-22 ENCOUNTER — TELEPHONE (OUTPATIENT)
Dept: URGENT CARE | Facility: CLINIC | Age: 34
End: 2019-05-22

## 2019-05-22 NOTE — TELEPHONE ENCOUNTER
----- Message from Deric Menezes NP sent at 5/21/2019 10:56 AM CDT -----  Needs flagyl 500mg po bid for 7 days for BV and Trich. TCmarichuy NP

## 2019-05-22 NOTE — TELEPHONE ENCOUNTER
Pt notified regarding lab result.  Phoned in Flagyl 500mg to [facility] [address] .  Pt expressed understanding.

## 2019-08-19 ENCOUNTER — CLINICAL SUPPORT (OUTPATIENT)
Dept: URGENT CARE | Facility: CLINIC | Age: 34
End: 2019-08-19
Payer: MEDICAID

## 2019-08-19 DIAGNOSIS — J32.9 SINUSITIS, UNSPECIFIED CHRONICITY, UNSPECIFIED LOCATION: Primary | ICD-10-CM

## 2019-08-19 DIAGNOSIS — R50.9 FEVER, UNSPECIFIED FEVER CAUSE: ICD-10-CM

## 2019-08-19 PROCEDURE — 99213 OFFICE O/P EST LOW 20 MIN: CPT | Mod: 25,S$GLB,, | Performed by: NURSE PRACTITIONER

## 2019-08-19 PROCEDURE — 99213 PR OFFICE/OUTPT VISIT, EST, LEVL III, 20-29 MIN: ICD-10-PCS | Mod: 25,S$GLB,, | Performed by: NURSE PRACTITIONER

## 2019-08-19 RX ORDER — AMOXICILLIN 875 MG/1
875 TABLET, FILM COATED ORAL 2 TIMES DAILY
Qty: 14 TABLET | Refills: 0 | Status: SHIPPED | OUTPATIENT
Start: 2019-08-19 | End: 2019-08-26

## 2019-08-19 RX ORDER — DEXAMETHASONE SODIUM PHOSPHATE 4 MG/ML
8 INJECTION, SOLUTION INTRA-ARTICULAR; INTRALESIONAL; INTRAMUSCULAR; INTRAVENOUS; SOFT TISSUE
Status: COMPLETED | OUTPATIENT
Start: 2019-08-19 | End: 2019-08-19

## 2019-08-19 RX ORDER — FLUTICASONE PROPIONATE 50 MCG
2 SPRAY, SUSPENSION (ML) NASAL DAILY
Qty: 15.8 ML | Refills: 0 | Status: SHIPPED | OUTPATIENT
Start: 2019-08-19 | End: 2020-11-23

## 2019-08-19 RX ORDER — CETIRIZINE HYDROCHLORIDE 10 MG/1
10 TABLET ORAL DAILY
Qty: 30 TABLET | Refills: 11 | Status: SHIPPED | OUTPATIENT
Start: 2019-08-19 | End: 2020-08-18

## 2019-08-19 RX ADMIN — DEXAMETHASONE SODIUM PHOSPHATE 8 MG: 4 INJECTION, SOLUTION INTRA-ARTICULAR; INTRALESIONAL; INTRAMUSCULAR; INTRAVENOUS; SOFT TISSUE at 11:08

## 2019-09-12 ENCOUNTER — TELEPHONE (OUTPATIENT)
Dept: ENDOCRINOLOGY | Facility: CLINIC | Age: 34
End: 2019-09-12

## 2019-09-12 NOTE — TELEPHONE ENCOUNTER
----- Message from Kennedy Lutz sent at 9/12/2019 11:48 AM CDT -----  Contact: Corina [phone]  Type: Patient Call Back    Who called: Corina     What is the request in detail: The patient is requesting an appointment with Dr. Pelayo. She states that she has been of the thyroid medication for a year    Can the clinic reply by [facility]?no    Would the patient rather a call back or a response via [facility]? Call back    Best call back number:[phone]

## 2019-09-12 NOTE — TELEPHONE ENCOUNTER
No future appts available  Pt placed on waiting list  Pt given resource phone number for alternative options ()  Advised to get in with PCP for immediate thyroid management

## 2019-11-11 ENCOUNTER — OFFICE VISIT (OUTPATIENT)
Dept: URGENT CARE | Facility: CLINIC | Age: 34
End: 2019-11-11
Payer: MEDICAID

## 2019-11-11 VITALS
SYSTOLIC BLOOD PRESSURE: 119 MMHG | HEART RATE: 87 BPM | OXYGEN SATURATION: 99 % | TEMPERATURE: 98 F | DIASTOLIC BLOOD PRESSURE: 77 MMHG

## 2019-11-11 DIAGNOSIS — J02.9 SORE THROAT: Primary | ICD-10-CM

## 2019-11-11 DIAGNOSIS — R53.83 FATIGUE, UNSPECIFIED TYPE: ICD-10-CM

## 2019-11-11 LAB
CTP QC/QA: YES
CTP QC/QA: YES
FLUAV AG NPH QL: NEGATIVE
FLUBV AG NPH QL: NEGATIVE
S PYO RRNA THROAT QL PROBE: NEGATIVE

## 2019-11-11 PROCEDURE — 99214 OFFICE O/P EST MOD 30 MIN: CPT | Mod: S$GLB,,, | Performed by: FAMILY MEDICINE

## 2019-11-11 PROCEDURE — 99214 PR OFFICE/OUTPT VISIT, EST, LEVL IV, 30-39 MIN: ICD-10-PCS | Mod: S$GLB,,, | Performed by: FAMILY MEDICINE

## 2019-11-11 PROCEDURE — 87804 POCT INFLUENZA A/B: ICD-10-PCS | Mod: QW,S$GLB,, | Performed by: FAMILY MEDICINE

## 2019-11-11 PROCEDURE — 87804 INFLUENZA ASSAY W/OPTIC: CPT | Mod: QW,S$GLB,, | Performed by: FAMILY MEDICINE

## 2019-11-11 PROCEDURE — 87880 POCT RAPID STREP A: ICD-10-PCS | Mod: QW,S$GLB,, | Performed by: FAMILY MEDICINE

## 2019-11-11 PROCEDURE — 87880 STREP A ASSAY W/OPTIC: CPT | Mod: QW,S$GLB,, | Performed by: FAMILY MEDICINE

## 2019-11-11 RX ORDER — DEXAMETHASONE SODIUM PHOSPHATE 100 MG/10ML
10 INJECTION INTRAMUSCULAR; INTRAVENOUS
Status: COMPLETED | OUTPATIENT
Start: 2019-11-11 | End: 2019-11-11

## 2019-11-11 RX ORDER — ALBUTEROL SULFATE 90 UG/1
2 AEROSOL, METERED RESPIRATORY (INHALATION) EVERY 6 HOURS PRN
Qty: 1 INHALER | Refills: 2 | Status: SHIPPED | OUTPATIENT
Start: 2019-11-11 | End: 2020-11-23

## 2019-11-11 RX ORDER — LEVOTHYROXINE SODIUM 137 UG/1
137 TABLET ORAL
Qty: 90 TABLET | Refills: 0 | Status: SHIPPED | OUTPATIENT
Start: 2019-11-11 | End: 2020-12-04 | Stop reason: SDUPTHER

## 2019-11-11 RX ADMIN — DEXAMETHASONE SODIUM PHOSPHATE 10 MG: 100 INJECTION INTRAMUSCULAR; INTRAVENOUS at 09:11

## 2019-11-11 NOTE — PROGRESS NOTES
Subjective:       Patient ID: Corina Liu is a 34 y.o. female.    Vitals:  tympanic temperature is 97.9 °F (36.6 °C). Her blood pressure is 119/77 and her pulse is 87. Her oxygen saturation is 99%.     Chief Complaint: Cough; Otalgia; and Sore Throat    Pt states that her ears hurt, her throat hurts, neck pain, congestion, fatigue, cough and pressure in her chest.  X 2 days.     Otalgia    There is pain in both ears. This is a new problem. The current episode started in the past 7 days. The problem occurs constantly. The problem has been gradually worsening. Associated symptoms include headaches, neck pain and a sore throat. Pertinent negatives include no coughing, diarrhea, rash or vomiting. She has tried nothing for the symptoms. The treatment provided no relief.       Constitution: Negative for chills, fatigue and fever.   HENT: Positive for ear pain and sore throat. Negative for congestion.    Neck: Positive for neck pain. Negative for painful lymph nodes.   Cardiovascular: Negative for chest pain and leg swelling.   Eyes: Negative for double vision and blurred vision.   Respiratory: Negative for cough and shortness of breath.    Gastrointestinal: Negative for nausea, vomiting and diarrhea.   Genitourinary: Negative for dysuria, frequency, urgency and history of kidney stones.   Musculoskeletal: Negative for joint pain, joint swelling, muscle cramps and muscle ache.   Skin: Negative for color change, pale, rash and bruising.   Allergic/Immunologic: Negative for seasonal allergies.   Neurological: Positive for headaches. Negative for dizziness, history of vertigo, light-headedness and passing out.   Hematologic/Lymphatic: Negative for swollen lymph nodes.   Psychiatric/Behavioral: Negative for nervous/anxious, sleep disturbance and depression. The patient is not nervous/anxious.        Objective:      Physical Exam   Constitutional: She is oriented to person, place, and time. She appears well-developed  and well-nourished. She is cooperative.  Non-toxic appearance. She does not have a sickly appearance. She appears ill. No distress.   HENT:   Head: Normocephalic and atraumatic.   Right Ear: Hearing, tympanic membrane, external ear and ear canal normal.   Left Ear: Hearing, tympanic membrane, external ear and ear canal normal.   Nose: Nose normal. No mucosal edema, rhinorrhea or nasal deformity. No epistaxis. Right sinus exhibits no maxillary sinus tenderness and no frontal sinus tenderness. Left sinus exhibits no maxillary sinus tenderness and no frontal sinus tenderness.   Mouth/Throat: Uvula is midline, oropharynx is clear and moist and mucous membranes are normal. No trismus in the jaw. Normal dentition. No uvula swelling. No oropharyngeal exudate, posterior oropharyngeal edema or posterior oropharyngeal erythema.   Eyes: Conjunctivae and lids are normal. No scleral icterus.   Neck: Trachea normal, full passive range of motion without pain and phonation normal. Neck supple. No neck rigidity. No edema and no erythema present.   Cardiovascular: Normal rate, regular rhythm, normal heart sounds, intact distal pulses and normal pulses.   Pulmonary/Chest: Effort normal and breath sounds normal. No respiratory distress. She has no decreased breath sounds. She has no rhonchi.   Abdominal: Normal appearance.   Musculoskeletal: Normal range of motion. She exhibits no edema or deformity.   Neurological: She is alert and oriented to person, place, and time. She exhibits normal muscle tone. Coordination normal.   Skin: Skin is warm, dry, intact, not diaphoretic and not pale.   Psychiatric: She has a normal mood and affect. Her speech is normal and behavior is normal. Judgment and thought content normal. Cognition and memory are normal.   Nursing note and vitals reviewed.        Assessment:       1. Sore throat    2. Fatigue, unspecified type        Plan:         Sore throat  -     POCT rapid strep A    Fatigue, unspecified  type  -     POCT Influenza A/B    Other orders  -     dexamethasone injection 10 mg  -     levothyroxine (SYNTHROID) 137 MCG Tab tablet; Take 1 tablet (137 mcg total) by mouth before breakfast.  Dispense: 90 tablet; Refill: 0        pt states that out of thyroid medication fr several days. She has thyroid removed. Nevin roque number given.

## 2019-11-14 ENCOUNTER — CLINICAL SUPPORT (OUTPATIENT)
Dept: URGENT CARE | Facility: CLINIC | Age: 34
End: 2019-11-14
Payer: MEDICAID

## 2019-11-14 VITALS
OXYGEN SATURATION: 98 % | SYSTOLIC BLOOD PRESSURE: 104 MMHG | HEART RATE: 95 BPM | WEIGHT: 221.19 LBS | TEMPERATURE: 97 F | RESPIRATION RATE: 14 BRPM | BODY MASS INDEX: 41.8 KG/M2 | DIASTOLIC BLOOD PRESSURE: 65 MMHG

## 2019-11-14 DIAGNOSIS — J40 BRONCHITIS: ICD-10-CM

## 2019-11-14 DIAGNOSIS — J01.90 ACUTE NON-RECURRENT SINUSITIS, UNSPECIFIED LOCATION: ICD-10-CM

## 2019-11-14 DIAGNOSIS — J02.9 PHARYNGITIS, UNSPECIFIED ETIOLOGY: Primary | ICD-10-CM

## 2019-11-14 PROCEDURE — 87880 POCT RAPID STREP A: ICD-10-PCS | Mod: QW,,, | Performed by: NURSE PRACTITIONER

## 2019-11-14 PROCEDURE — 87880 STREP A ASSAY W/OPTIC: CPT | Mod: QW,,, | Performed by: NURSE PRACTITIONER

## 2019-11-14 PROCEDURE — 99214 OFFICE O/P EST MOD 30 MIN: CPT | Mod: 25,S$GLB,, | Performed by: NURSE PRACTITIONER

## 2019-11-14 PROCEDURE — 87804 INFLUENZA ASSAY W/OPTIC: CPT | Mod: QW,,, | Performed by: NURSE PRACTITIONER

## 2019-11-14 PROCEDURE — 99214 PR OFFICE/OUTPT VISIT, EST, LEVL IV, 30-39 MIN: ICD-10-PCS | Mod: 25,S$GLB,, | Performed by: NURSE PRACTITIONER

## 2019-11-14 PROCEDURE — 87804 POCT INFLUENZA A/B: ICD-10-PCS | Mod: QW,,, | Performed by: NURSE PRACTITIONER

## 2019-11-14 RX ORDER — FLUTICASONE PROPIONATE 50 MCG
2 SPRAY, SUSPENSION (ML) NASAL DAILY
Qty: 15.8 ML | Refills: 0 | Status: SHIPPED | OUTPATIENT
Start: 2019-11-14 | End: 2020-11-23

## 2019-11-14 RX ORDER — AMOXICILLIN 875 MG/1
875 TABLET, FILM COATED ORAL 2 TIMES DAILY
Qty: 14 TABLET | Refills: 0 | Status: SHIPPED | OUTPATIENT
Start: 2019-11-14 | End: 2019-11-21

## 2019-11-14 RX ORDER — DEXAMETHASONE SODIUM PHOSPHATE 4 MG/ML
8 INJECTION, SOLUTION INTRA-ARTICULAR; INTRALESIONAL; INTRAMUSCULAR; INTRAVENOUS; SOFT TISSUE
Status: COMPLETED | OUTPATIENT
Start: 2019-11-14 | End: 2019-11-14

## 2019-11-14 RX ORDER — PREDNISONE 20 MG/1
40 TABLET ORAL DAILY
Qty: 10 TABLET | Refills: 0 | Status: SHIPPED | OUTPATIENT
Start: 2019-11-14 | End: 2019-11-19

## 2019-11-14 RX ORDER — CETIRIZINE HYDROCHLORIDE 10 MG/1
10 TABLET ORAL DAILY
Qty: 30 TABLET | Refills: 11 | Status: SHIPPED | OUTPATIENT
Start: 2019-11-14 | End: 2020-11-23

## 2019-11-14 RX ADMIN — DEXAMETHASONE SODIUM PHOSPHATE 8 MG: 4 INJECTION, SOLUTION INTRA-ARTICULAR; INTRALESIONAL; INTRAMUSCULAR; INTRAVENOUS; SOFT TISSUE at 04:11

## 2019-11-14 NOTE — PROGRESS NOTES
Subjective:       Patient ID: Corina Liu is a 34 y.o. female.    Vitals:  weight is 100.3 kg (221 lb 3.2 oz). Her temperature is 97.4 °F (36.3 °C). Her blood pressure is 104/65 and her pulse is 95. Her respiration is 14 and oxygen saturation is 98%.     Chief Complaint: Sinusitis    Patient complains of sinus congestion, sore throat, productive cough, chills, and posterior neck pain that began 8 days ago. Patient was seen at another urgent care, diagnosed with sore throat and fatigue, was prescribed a inhaler and was a given a steriod shot. patient reports she was swabbed for strep and flu, both came back negative. Patient reports she has had no improvement and is getting worse. Denies shortness of breath, nausea, vomiting, diarrhea, chest pain.     Sinusitis   This is a new problem. The current episode started in the past 7 days (11/8/19). The problem has been gradually worsening since onset. Maximum temperature: unmeasured  Her pain is at a severity of 8/10. She is experiencing no pain. Associated symptoms include congestion, coughing, headaches, sinus pressure and a sore throat. Pertinent negatives include no chills or shortness of breath. (PND, nasal and chest congestion,  can feel all in the chest and SOB) Treatments tried:  inhaler, dayquil amnd nyquil ( last dosage was tis morning )  The treatment provided no relief.       Constitution: Negative for chills, fatigue and fever.   HENT: Positive for congestion, sinus pressure and sore throat.    Neck: Negative for painful lymph nodes.   Cardiovascular: Negative for chest pain and leg swelling.   Eyes: Negative for double vision and blurred vision.   Respiratory: Positive for cough. Negative for shortness of breath.    Gastrointestinal: Negative for abdominal pain, nausea, vomiting and diarrhea.   Endocrine: negative.   Genitourinary: Negative for dysuria, frequency, urgency and history of kidney stones.   Musculoskeletal: Negative for joint pain,  joint swelling, muscle cramps and muscle ache.   Skin: Negative for color change, pale, rash and bruising.   Allergic/Immunologic: Negative for seasonal allergies.   Neurological: Positive for headaches. Negative for dizziness, history of vertigo, light-headedness and passing out.   Hematologic/Lymphatic: Negative for swollen lymph nodes.   Psychiatric/Behavioral: Negative for nervous/anxious, sleep disturbance and depression. The patient is not nervous/anxious.        Objective:      Physical Exam   Constitutional: She is oriented to person, place, and time. Vital signs are normal. She appears well-developed and well-nourished. She is cooperative.  Non-toxic appearance. She does not have a sickly appearance. She does not appear ill. No distress.   HENT:   Head: Normocephalic and atraumatic.   Right Ear: Hearing, tympanic membrane, external ear and ear canal normal.   Left Ear: Hearing, tympanic membrane, external ear and ear canal normal.   Nose: Mucosal edema and rhinorrhea present. No nasal deformity. No epistaxis. Right sinus exhibits maxillary sinus tenderness and frontal sinus tenderness. Left sinus exhibits maxillary sinus tenderness and frontal sinus tenderness.   Mouth/Throat: Uvula is midline and mucous membranes are normal. No trismus in the jaw. Normal dentition. No uvula swelling. Posterior oropharyngeal erythema present. No oropharyngeal exudate or posterior oropharyngeal edema.   Eyes: Conjunctivae and lids are normal. Right eye exhibits no discharge. Left eye exhibits no discharge. No scleral icterus.   Neck: Trachea normal, normal range of motion, full passive range of motion without pain and phonation normal. Neck supple.   Cardiovascular: Normal rate, regular rhythm, normal heart sounds, intact distal pulses and normal pulses.   Pulmonary/Chest: Effort normal and breath sounds normal. No respiratory distress.   Abdominal: Soft. Normal appearance and bowel sounds are normal. She exhibits no  distension, no pulsatile midline mass and no mass. There is no tenderness.   Musculoskeletal: Normal range of motion. She exhibits no edema or deformity.   Neurological: She is alert and oriented to person, place, and time. She exhibits normal muscle tone. Coordination normal. GCS eye subscore is 4. GCS verbal subscore is 5. GCS motor subscore is 6.   Skin: Skin is warm, dry, intact, not diaphoretic and not pale.   Psychiatric: She has a normal mood and affect. Her speech is normal and behavior is normal. Judgment and thought content normal. Cognition and memory are normal.   Nursing note and vitals reviewed.        Assessment:       1. Pharyngitis, unspecified etiology    2. Acute non-recurrent sinusitis, unspecified location    3. Bronchitis        Plan:       Influenza negative. Rapid strep negative.       Pharyngitis, unspecified etiology  -     POCT rapid strep A  -     POCT Influenza A/B    Acute non-recurrent sinusitis, unspecified location    Bronchitis    Other orders  -     dexamethasone injection 8 mg  -     cetirizine (ZYRTEC) 10 MG tablet; Take 1 tablet (10 mg total) by mouth once daily.  Dispense: 30 tablet; Refill: 11  -     fluticasone propionate (FLONASE) 50 mcg/actuation nasal spray; 2 sprays (100 mcg total) by Each Nostril route once daily.  Dispense: 15.8 mL; Refill: 0  -     dexchlorphen-phenylephrine-DM (POLYTUSSIN DM) 1-5-10 mg/5 mL Syrp; Take 5 mLs by mouth every 4 (four) hours as needed.  Dispense: 120 mL; Refill: 0  -     predniSONE (DELTASONE) 20 MG tablet; Take 2 tablets (40 mg total) by mouth once daily. for 5 days  Dispense: 10 tablet; Refill: 0  -     amoxicillin (AMOXIL) 875 MG tablet; Take 1 tablet (875 mg total) by mouth 2 (two) times daily. for 7 days  Dispense: 14 tablet; Refill: 0

## 2019-11-14 NOTE — PATIENT INSTRUCTIONS
What Is Acute Bronchitis?  Acute bronchitis is when the airways in your lungs (bronchial tubes) become red and swollen (inflamed). It is usually caused by a viral infection. But it can also occur because of a bacteria or allergen. Symptoms include a cough that produces yellow or greenish mucus and can last for days or sometimes weeks.  Inside healthy lungs    Air travels in and out of the lungs through the airways. The linings of these airways produce sticky mucus. This mucus traps particles that enter the lungs. Tiny structures called cilia then sweep the particles out of the airways.     Healthy airway: Airways are normally open. Air moves in and out easily.      Healthy cilia: Tiny, hairlike cilia sweep mucus and particles up and out of the airways.   Lungs with bronchitis  Bronchitis often occurs with a cold or the flu virus. The airways become inflamed (red and swollen). There is a deep hacking cough from the extra mucus. Other symptoms may include:  · Wheezing  · Chest discomfort  · Shortness of breath  · Mild fever  A second infection, this time due to bacteria, may then occur. And airways irritated by allergens or smoke are more likely to get infected.        Inflamed airway: Inflammation and extra mucus narrow the airway, causing shortness of breath.      Impaired cilia: Extra mucus impairs cilia, causing congestion and wheezing. Smoking makes the problem worse.   Making a diagnosis  A physical exam, health history, and certain tests help your healthcare provider make the diagnosis.  Health history  Your healthcare provider will ask you about your symptoms.  The exam  Your provider listens to your chest for signs of congestion. He or she may also check your ears, nose, and throat.  Possible tests  · A sputum test for bacteria. This requires a sample of mucus from your lungs.  · A nasal or throat swab. This tests to see if you have a bacterial infection.  · A chest X-ray. This is done if your healthcare  provider thinks you have pneumonia.  · Tests to check for an underlying condition. Other tests may be done to check for things such as allergies, asthma, or COPD (chronic obstructive pulmonary disease). You may need to see a specialist for more lung function testing.  Treating a cough  The main treatment for bronchitis is easing symptoms. Avoiding smoke, allergens, and other things that trigger coughing can often help. If the infection is bacterial, you may be given antibiotics. During the illness, it's important to get plenty of sleep. To ease symptoms:  · Dont smoke. Also avoid secondhand smoke.  · Use a humidifier. Or try breathing in steam from a hot shower. This may help loosen mucus.  · Drink a lot of water and juice. They can soothe the throat and may help thin mucus.  · Sit up or use extra pillows when in bed. This helps to lessen coughing and congestion.  · Ask your provider about using medicine. Ask about using cough medicine, pain and fever medicine, or a decongestant.  Antibiotics  Most cases of bronchitis are caused by cold or flu viruses. They dont need antibiotics to treat them, even if your mucus is thick and green or yellow. Antibiotics dont treat viral illness and antibiotics have not been shown to have any benefit in cases of acute bronchitis. Taking antibiotics when they are not needed increases your risk of getting an infection later that is antibiotic-resistant. Antibiotics can also cause severe cases of diarrhea that require other antibiotics to treat.  It is important that you accept your healthcare provider's opinion to not use antibiotics. Your provider will prescribe antibiotics if the infection is caused by bacteria. If they are prescribed:  · Take all of the medicine. Take the medicine until it is used up, even if symptoms have improved. If you dont, the bronchitis may come back.  · Take the medicines as directed. For instance, some medicines should be taken with food.  · Ask about  side effects. Ask your provider or pharmacist what side effects are common, and what to do about them.  Follow-up care  You should see your provider again in 2 to 3 weeks. By this time, symptoms should have improved. An infection that lasts longer may mean you have a more serious problem.  Prevention  · Avoid tobacco smoke. If you smoke, quit. Stay away from smoky places. Ask friends and family not to smoke around you, or in your home or car.  · Get checked for allergies.  · Ask your provider about getting a yearly flu shot. Also ask about pneumococcal or pneumonia shots.  · Wash your hands often. This helps reduce the chance of picking up viruses that cause colds and flu.  Call your healthcare provider if:  · Symptoms worsen, or you have new symptoms  · Breathing problems worsen or  become severe  · Symptoms dont get better within a week, or within 3 days of taking antibiotics   Date Last Reviewed: 2/1/2017  © 9850-9523 [employer]. [address] [place] [address]. All rights reserved. This information is not intended as a substitute for professional medical care. Always follow your healthcare professional's instructions.        Acute Sinusitis    Acute sinusitis is irritation and swelling of the sinuses. It is usually caused by a viral infection after a common cold. Your doctor can help you find relief.  What is acute sinusitis?  Sinuses are air-filled spaces in the skull behind the face. They are kept moist and clean by a lining of mucosa. Things such as pollen, smoke, and chemical fumes can irritate the mucosa. It can then swell up. As a response to irritation, the mucosa makes more mucus and other fluids. Tiny hairlike cilia cover the mucosa. Cilia help carry mucus toward the opening of the sinus. Too much mucus may cause the cilia to stop working. This blocks the sinus opening. A buildup of fluid in the sinuses then causes pain and pressure. It can also encourage bacteria to grow in  the sinuses.  Common symptoms of acute sinusitis  You may have:  · Facial soreness pain  · Headache  · Fever  · Fluid draining in the back of the throat (postnasal drip)  · Congestion  · Drainage that is thick and colored, instead of clear  · Cough  Diagnosing acute sinusitis  Your doctor will ask about your symptoms and health history. He or she will look at your ear, nose, and throat. You usually won't need to have X-rays taken.    The doctor may take a sample of mucus to check for bacteria. If you have sinusitis that keeps coming back, you may need imaging tests such as X-rays or CAT scans. This will help your doctor check for a structural problem that may be causing the infection.  Treating acute sinusitis  Treatment is aimed at unblocking the sinus opening and helping the cilia work again. You may need to take antihistamine and decongestant medicine. These can reduce inflammation and decrease the amount of fluid your sinuses make. If you have a bacterial infection, you will need to take antibiotic medicine for 10 to 14 days. Take this medicine until it is gone, even if you feel better.  Date Last Reviewed: 10/1/2016  © 3186-8282 [employer]. [address] [place], [place] [id number]. All rights reserved. This information is not intended as a substitute for professional medical care. Always follow your healthcare professional's instructions.        When You Have a Sore Throat    A sore throat can be painful. There are many reasons why you may have a sore throat. Your healthcare provider will work with you to find the cause of your sore throat. He or she will also find the best treatment for you.  What causes a sore throat?  Sore throats can be caused or worsened by:  · Cold or flu viruses  · Bacteria  · Irritants such as tobacco smoke or air pollution  · Acid reflux  A healthy throat  The tonsils are on the sides of the throat near the base of the tongue. They collect viruses and bacteria and help  fight infection. The throat (pharynx) is the passage for air. Mucus from the nasal cavity also moves down the passage.  An inflamed throat  The tonsils and pharynx can become inflamed due to a cold or flu virus. Postnasal drip (excess mucus draining from the nasal cavity) can irritate the throat. It can also make the throat or tonsils more likely to be infected by bacteria. Severe, untreated tonsillitis in children or adults can cause a pocket of pus (abscess) to form near the tonsil.  Your evaluation  A medical evaluation can help find the cause of your sore throat. It can also help your healthcare provider choose the best treatment for you. The evaluation may include a health history, physical exam, and diagnostic tests.  Health history  Your healthcare provider may ask you the following:  · How long has the sore throat lasted and how have you been treating it?  · Do you have any other symptoms, such as body aches, fever, or cough?  · Does your sore throat recur? If so, how often? How many days of school or work have you missed because of a sore throat?  · Do you have trouble eating or swallowing?  · Have you been told that you snore or have other sleep problems?  · Do you have bad breath?  · Do you cough up bad-tasting mucus?  Physical exam  During the exam, your healthcare provider checks your ears, nose, and throat for problems. He or she also checks for swelling in the neck, and may listen to your chest.  Possible tests  Other tests your healthcare provider may perform include:  · A throat swab to check for bacteria such as streptococcus (the bacteria that causes strep throat)  · A blood test to check for mononucleosis (a viral infection)  · A chest X-ray to rule out pneumonia, especially if you have a cough  Treating a sore throat  Treatment depends on many factors. What is the likely cause? Is the problem recent? Does it keep coming back? In many cases, the best thing to do is to treat the symptoms, rest,  "and let the problem heal itself. Antibiotics may help clear up some bacterial infections. For cases of severe or recurring tonsillitis, the tonsils may need to be removed.  Relieving your symptoms  · Dont smoke, and avoid secondhand smoke.  · For children, try throat sprays or Popsicles. Adults and older children may try lozenges.  · Drink warm liquids to soothe the throat and help thin mucus. Avoid alcohol, spicy foods, and acidic drinks such as orange juice. These can irritate the throat.  · Gargle with warm saltwater (1 teaspoon of salt to 8 ounces of warm water).  · Use a humidifier to keep air moist and relieve throat dryness.  · Try over-the-counter pain relievers such as acetaminophen or ibuprofen. Use as directed, and dont exceed the recommended dose. Dont give aspirin to children.   Are antibiotics needed?  If your sore throat is due to a bacterial infection, antibiotics may speed healing and prevent complications. Although group A streptococcus ("strep throat" or GAS) is the major treatable infection for a sore throat, GAS causes only 5% to 15% of sore throats in adults who seek medical care. Most sore throats are caused by cold or flu viruses. And antibiotics dont treat viral illness. In fact, using antibiotics when theyre not needed may produce bacteria that are harder to kill. Your healthcare provider will prescribe antibiotics only if he or she thinks they are likely to help.  If antibiotics are prescribed  Take the medicine exactly as directed. Be sure to finish your prescription even if youre feeling better. And be sure to ask your healthcare provider or pharmacist what side effects are common and what to do about them.  Is surgery needed?  In some cases, tonsils need to be removed. This is often done as outpatient (same-day) surgery. Your healthcare provider may advise removing the tonsils in cases of:  · Several severe bouts of tonsillitis in a year. Severe episodes include those that lead " to missed days of school or work, or that need to be treated with antibiotics.  · Tonsillitis that causes breathing problems during sleep  · Tonsillitis caused by food particles collecting in pouches in the tonsils (cryptic tonsillitis)  Call your healthcare provider if any of the following occur:  · Symptoms worsen, or new symptoms develop.  · Swollen tonsils make breathing difficult.  · The pain is severe enough to keep you from drinking liquids.  · A skin rash, hives, or wheezing develops. Any of these could signal an allergic reaction to antibiotics.  · Symptoms dont improve within a week.  · Symptoms dont improve within 2 to 3 days of starting antibiotics.   Date Last Reviewed: 10/1/2016  © 0695-9772 [employer]. [address] [place] [address], [place] [address]. All rights reserved. This information is not intended as a substitute for professional medical care. Always follow your healthcare professional's instructions.

## 2019-11-23 ENCOUNTER — HOSPITAL ENCOUNTER (EMERGENCY)
Facility: HOSPITAL | Age: 34
Discharge: HOME | End: 2019-11-23
Attending: EMERGENCY MEDICINE
Payer: MEDICAID

## 2019-11-23 VITALS
DIASTOLIC BLOOD PRESSURE: 67 MMHG | TEMPERATURE: 98 F | RESPIRATION RATE: 18 BRPM | WEIGHT: 220 LBS | HEART RATE: 77 BPM | HEIGHT: 60 IN | BODY MASS INDEX: 43.19 KG/M2 | SYSTOLIC BLOOD PRESSURE: 116 MMHG | OXYGEN SATURATION: 97 %

## 2019-11-23 DIAGNOSIS — R07.9 CHEST PAIN: ICD-10-CM

## 2019-11-23 DIAGNOSIS — R07.89 ATYPICAL CHEST PAIN: Primary | ICD-10-CM

## 2019-11-23 LAB
ALBUMIN SERPL BCP-MCNC: 4.1 G/DL (ref 3.5–5.2)
ALP SERPL-CCNC: 46 U/L (ref 55–135)
ALT SERPL W/O P-5'-P-CCNC: 23 U/L (ref 10–44)
ANION GAP SERPL CALC-SCNC: 8 MMOL/L (ref 8–16)
AST SERPL-CCNC: 17 U/L (ref 10–40)
BASOPHILS # BLD AUTO: 0.04 K/UL (ref 0–0.2)
BASOPHILS NFR BLD: 0.3 % (ref 0–1.9)
BILIRUB SERPL-MCNC: 0.7 MG/DL (ref 0.1–1)
BNP SERPL-MCNC: <10 PG/ML (ref 0–99)
BUN SERPL-MCNC: 17 MG/DL (ref 6–20)
CALCIUM SERPL-MCNC: 9.8 MG/DL (ref 8.7–10.5)
CHLORIDE SERPL-SCNC: 101 MMOL/L (ref 95–110)
CO2 SERPL-SCNC: 30 MMOL/L (ref 23–29)
CREAT SERPL-MCNC: 0.9 MG/DL (ref 0.5–1.4)
DIFFERENTIAL METHOD: ABNORMAL
EOSINOPHIL # BLD AUTO: 0.3 K/UL (ref 0–0.5)
EOSINOPHIL NFR BLD: 2.4 % (ref 0–8)
ERYTHROCYTE [DISTWIDTH] IN BLOOD BY AUTOMATED COUNT: 13.8 % (ref 11.5–14.5)
EST. GFR  (AFRICAN AMERICAN): >60 ML/MIN/1.73 M^2
EST. GFR  (NON AFRICAN AMERICAN): >60 ML/MIN/1.73 M^2
GLUCOSE SERPL-MCNC: 109 MG/DL (ref 70–110)
HCT VFR BLD AUTO: 42.5 % (ref 37–48.5)
HGB BLD-MCNC: 14.4 G/DL (ref 12–16)
IMM GRANULOCYTES # BLD AUTO: 0.12 K/UL (ref 0–0.04)
LYMPHOCYTES # BLD AUTO: 3.4 K/UL (ref 1–4.8)
LYMPHOCYTES NFR BLD: 25.1 % (ref 18–48)
MCH RBC QN AUTO: 32.2 PG (ref 27–31)
MCHC RBC AUTO-ENTMCNC: 33.9 G/DL (ref 32–36)
MCV RBC AUTO: 95 FL (ref 82–98)
MONOCYTES # BLD AUTO: 0.7 K/UL (ref 0.3–1)
MONOCYTES NFR BLD: 5.1 % (ref 4–15)
NEUTROPHILS # BLD AUTO: 8.9 K/UL (ref 1.8–7.7)
NEUTROPHILS NFR BLD: 66.2 % (ref 38–73)
NRBC BLD-RTO: 0 /100 WBC
PLATELET # BLD AUTO: 261 K/UL (ref 150–350)
PMV BLD AUTO: 10.4 FL (ref 9.2–12.9)
POTASSIUM SERPL-SCNC: 4.1 MMOL/L (ref 3.5–5.1)
PROT SERPL-MCNC: 7 G/DL (ref 6–8.4)
RBC # BLD AUTO: 4.47 M/UL (ref 4–5.4)
SODIUM SERPL-SCNC: 139 MMOL/L (ref 136–145)
TROPONIN I SERPL DL<=0.01 NG/ML-MCNC: 0.01 NG/ML (ref 0–0.03)
TROPONIN I SERPL DL<=0.01 NG/ML-MCNC: <0.006 NG/ML (ref 0–0.03)
WBC # BLD AUTO: 13.44 K/UL (ref 3.9–12.7)

## 2019-11-23 PROCEDURE — 36000 PLACE NEEDLE IN VEIN: CPT

## 2019-11-23 PROCEDURE — 80053 COMPREHEN METABOLIC PANEL: CPT

## 2019-11-23 PROCEDURE — 83880 ASSAY OF NATRIURETIC PEPTIDE: CPT

## 2019-11-23 PROCEDURE — 25000003 PHARM REV CODE 250: Performed by: EMERGENCY MEDICINE

## 2019-11-23 PROCEDURE — 85025 COMPLETE CBC W/AUTO DIFF WBC: CPT

## 2019-11-23 PROCEDURE — 99285 EMERGENCY DEPT VISIT HI MDM: CPT | Mod: 25

## 2019-11-23 PROCEDURE — 93005 ELECTROCARDIOGRAM TRACING: CPT

## 2019-11-23 PROCEDURE — 36415 COLL VENOUS BLD VENIPUNCTURE: CPT

## 2019-11-23 PROCEDURE — 84484 ASSAY OF TROPONIN QUANT: CPT | Mod: 91

## 2019-11-23 RX ORDER — ASPIRIN 325 MG
325 TABLET ORAL
Status: COMPLETED | OUTPATIENT
Start: 2019-11-23 | End: 2019-11-23

## 2019-11-23 RX ORDER — DICLOFENAC SODIUM 50 MG/1
50 TABLET, DELAYED RELEASE ORAL 3 TIMES DAILY PRN
Qty: 15 TABLET | Refills: 0 | Status: SHIPPED | OUTPATIENT
Start: 2019-11-23 | End: 2020-11-23

## 2019-11-23 RX ADMIN — ASPIRIN 325 MG ORAL TABLET 325 MG: 325 PILL ORAL at 05:11

## 2019-11-23 NOTE — ED PROVIDER NOTES
Encounter Date: 2019    SCRIBE #1 NOTE: I, Vu Lux, am scribing for, and in the presence of, Luis Burnham MD.       History     Chief Complaint   Patient presents with    Chest Pain     started last night       Time seen by provider: 5:14 PM on 2019    Corina Liu is a 34 y.o. female with PMHx of hypoglycemia, tachycardia, cancer, thyroid disease, and hepititis C of who presents to the ED with an onset of pain in both arms secondary to chest pain that radiates to her neck and back beginning x1 evening ago. Other associated symptoms include headache, SOB, cough, diarrhea, nausea, and swelling in her arms and legs. The patient endorses getting off amoxacillin two days ago, and using an inhaler to help with her cough. The patient denies vomiting or any other symptoms at this time. No pertinent PSHx.       The history is provided by the patient.     Review of patient's allergies indicates:   Allergen Reactions    Levaquin [levofloxacin] Swelling and Rash     Joint pain, abdominal pain     Past Medical History:   Diagnosis Date    Cancer     Thyroid    Gastro-esophageal reflux     Hepatitis C 2016    History of PCOS     Hyperthyroidism     Hypoglycemia     Tachycardia     Thyroid disease     hypothyroid, pappilary carcinoma     Past Surgical History:   Procedure Laterality Date     SECTION      x3    HYSTERECTOMY       partical     lymthnodes      from the right arm     thyroid nodule biopsy      THYROIDECTOMY      10/17/13    TONSILLECTOMY, ADENOIDECTOMY      TUBAL LIGATION       Family History   Problem Relation Age of Onset    Breast cancer Maternal Grandmother     Ovarian cancer Maternal Grandmother     Cataracts Maternal Grandmother     Cancer Mother         VULVAR AND CERVICAL    Ovarian cancer Mother     Colon cancer Neg Hx      Social History     Tobacco Use    Smoking status: Current Every Day Smoker     Packs/day: 1.00     Types: Cigarettes     Smokeless tobacco: Never Used   Substance Use Topics    Alcohol use: Yes     Comment: socialy     Drug use: No     Comment: Prescription Methadone     Review of Systems   Constitutional: Negative for fever.   HENT: Negative for sore throat.    Respiratory: Positive for cough and shortness of breath.    Cardiovascular: Positive for chest pain and leg swelling.        +arm swelling   Gastrointestinal: Positive for diarrhea and nausea. Negative for vomiting.   Genitourinary: Negative for dysuria.   Musculoskeletal: Positive for arthralgias (BUE), back pain and neck pain.   Skin: Negative for rash.   Neurological: Positive for headaches. Negative for weakness.   Hematological: Does not bruise/bleed easily.       Physical Exam     Initial Vitals [11/23/19 1650]   BP Pulse Resp Temp SpO2   129/78 98 18 98.7 °F (37.1 °C) 100 %      MAP       --         Physical Exam    Nursing note and vitals reviewed.  Constitutional: She appears well-developed and well-nourished. She is not diaphoretic. No distress.   HENT:   Head: Normocephalic and atraumatic.   Eyes: EOM are normal. Pupils are equal, round, and reactive to light.   Neck: Normal range of motion. Neck supple.   Cardiovascular: Normal rate, regular rhythm, normal heart sounds and intact distal pulses. Exam reveals no gallop and no friction rub.    No murmur heard.  Pulmonary/Chest: Breath sounds normal. No respiratory distress. She has no wheezes. She has no rhonchi. She has no rales.   Abdominal: Soft. Bowel sounds are normal. There is no tenderness. There is no rebound and no guarding.   Musculoskeletal: Normal range of motion.   Neurological: She is alert and oriented to person, place, and time.   Skin: Skin is warm and dry.   Psychiatric: She has a normal mood and affect. Her behavior is normal. Judgment and thought content normal.         ED Course   Procedures  Labs Reviewed - No data to display  EKG Readings: (Independently Interpreted)   Heart Rate: 89.   Sinus  rhythm of 89 bpm. Normal axis and intervals. No acute ST segment changes. No STEMI.        Imaging Results    None          Medical Decision Making:   Initial Assessment:   34-year-old female presented with chest pain.  Differential Diagnosis:   My differential diagnosis includes atypical MI, myocarditis, and viral illness.    Clinical Tests:   Lab Tests: Ordered and Reviewed  Radiological Study: Ordered and Reviewed  Medical Tests: Ordered and Reviewed  ED Management:  The patient was emergently evaluated in the emergency department, her evaluation was significant for a young female with a normal lung exam.  The patient's EKG showed no acute abnormalities per my independent interpretation.  The patient's x-ray and labs, including multiple serial troponins, showed no acute abnormalities.  The patient likely has chest wall pain.  She is stable for discharge to home.  She will be discharged home p.o. diclofenac and she is to follow up with her PCP for further care.            Scribe Attestation:   Scribe #1: I performed the above scribed service and the documentation accurately describes the services I performed. I attest to the accuracy of the note.                   I, Dr. Luis Burnham, personally performed the services described in this documentation. All medical record entries made by the scribe were at my direction and in my presence.  I have reviewed the chart and agree that the record reflects my personal performance and is accurate and complete. Luis Burnham MD.  10:52 PM 11/23/2019          Clinical Impression:       ICD-10-CM ICD-9-CM   1. Atypical chest pain R07.89 786.59   2. Chest pain R07.9 786.50         Disposition:   Disposition: Discharged  Condition: Stable                     Luis Burnham MD  11/23/19 3370

## 2020-11-23 ENCOUNTER — OFFICE VISIT (OUTPATIENT)
Dept: FAMILY MEDICINE | Facility: CLINIC | Age: 35
End: 2020-11-23
Payer: MEDICAID

## 2020-11-23 VITALS
HEART RATE: 90 BPM | DIASTOLIC BLOOD PRESSURE: 66 MMHG | BODY MASS INDEX: 36.44 KG/M2 | OXYGEN SATURATION: 96 % | TEMPERATURE: 98 F | RESPIRATION RATE: 16 BRPM | SYSTOLIC BLOOD PRESSURE: 100 MMHG | HEIGHT: 60 IN | WEIGHT: 185.63 LBS

## 2020-11-23 DIAGNOSIS — Z23: ICD-10-CM

## 2020-11-23 DIAGNOSIS — Z71.6 ENCOUNTER FOR SMOKING CESSATION COUNSELING: ICD-10-CM

## 2020-11-23 DIAGNOSIS — Z11.4 ENCOUNTER FOR SCREENING FOR HIV: ICD-10-CM

## 2020-11-23 DIAGNOSIS — M67.431 GANGLION OF RIGHT WRIST: ICD-10-CM

## 2020-11-23 DIAGNOSIS — R76.8 POSITIVE HEPATITIS C ANTIBODY TEST: ICD-10-CM

## 2020-11-23 DIAGNOSIS — Z11.59 ENCOUNTER FOR HEPATITIS C SCREENING TEST FOR LOW RISK PATIENT: ICD-10-CM

## 2020-11-23 DIAGNOSIS — Z23 VACCINE FOR STREPTOCOCCUS PNEUMONIAE AND INFLUENZA: ICD-10-CM

## 2020-11-23 DIAGNOSIS — E89.0 H/O THYROIDECTOMY: ICD-10-CM

## 2020-11-23 DIAGNOSIS — E89.0 POSTOPERATIVE HYPOTHYROIDISM: ICD-10-CM

## 2020-11-23 DIAGNOSIS — F50.81 BINGE EATING DISORDER: ICD-10-CM

## 2020-11-23 DIAGNOSIS — Z23 INFLUENZA VACCINE NEEDED: ICD-10-CM

## 2020-11-23 DIAGNOSIS — N39.41 URGE INCONTINENCE OF URINE: Primary | ICD-10-CM

## 2020-11-23 DIAGNOSIS — G62.9 NEUROPATHY: ICD-10-CM

## 2020-11-23 PROCEDURE — 99204 OFFICE O/P NEW MOD 45 MIN: CPT | Mod: 25,S$GLB,, | Performed by: NURSE PRACTITIONER

## 2020-11-23 PROCEDURE — 90472 IMMUNIZATION ADMIN EACH ADD: CPT | Mod: S$GLB,,, | Performed by: NURSE PRACTITIONER

## 2020-11-23 PROCEDURE — 90471 FLU VACCINE (QUAD) GREATER THAN OR EQUAL TO 3YO PRESERVATIVE FREE IM: ICD-10-PCS | Mod: S$GLB,,, | Performed by: NURSE PRACTITIONER

## 2020-11-23 PROCEDURE — 90472 PNEUMOCOCCAL POLYSACCHARIDE VACCINE 23-VALENT =>2YO SQ IM: ICD-10-PCS | Mod: S$GLB,,, | Performed by: NURSE PRACTITIONER

## 2020-11-23 PROCEDURE — 90714 TD VACCINE GREATER THAN OR EQUAL TO 7YO WITH PRESERVATIVE IM: ICD-10-PCS | Mod: S$GLB,,, | Performed by: NURSE PRACTITIONER

## 2020-11-23 PROCEDURE — 99204 PR OFFICE/OUTPT VISIT, NEW, LEVL IV, 45-59 MIN: ICD-10-PCS | Mod: 25,S$GLB,, | Performed by: NURSE PRACTITIONER

## 2020-11-23 PROCEDURE — 90686 FLU VACCINE (QUAD) GREATER THAN OR EQUAL TO 3YO PRESERVATIVE FREE IM: ICD-10-PCS | Mod: S$GLB,,, | Performed by: NURSE PRACTITIONER

## 2020-11-23 PROCEDURE — 90686 IIV4 VACC NO PRSV 0.5 ML IM: CPT | Mod: S$GLB,,, | Performed by: NURSE PRACTITIONER

## 2020-11-23 PROCEDURE — 90732 PPSV23 VACC 2 YRS+ SUBQ/IM: CPT | Mod: S$GLB,,, | Performed by: NURSE PRACTITIONER

## 2020-11-23 PROCEDURE — 90732 PNEUMOCOCCAL POLYSACCHARIDE VACCINE 23-VALENT =>2YO SQ IM: ICD-10-PCS | Mod: S$GLB,,, | Performed by: NURSE PRACTITIONER

## 2020-11-23 PROCEDURE — 90714 TD VACC NO PRESV 7 YRS+ IM: CPT | Mod: S$GLB,,, | Performed by: NURSE PRACTITIONER

## 2020-11-23 PROCEDURE — 90471 IMMUNIZATION ADMIN: CPT | Mod: S$GLB,,, | Performed by: NURSE PRACTITIONER

## 2020-11-23 RX ORDER — ALBUTEROL SULFATE 90 UG/1
2 AEROSOL, METERED RESPIRATORY (INHALATION) EVERY 6 HOURS PRN
Qty: 18 G | Refills: 5 | Status: SHIPPED | OUTPATIENT
Start: 2020-11-23 | End: 2021-05-31 | Stop reason: SDUPTHER

## 2020-11-23 RX ORDER — LISDEXAMFETAMINE DIMESYLATE 50 MG/1
50 CAPSULE ORAL EVERY MORNING
Qty: 30 CAPSULE | Refills: 0 | Status: SHIPPED | OUTPATIENT
Start: 2020-11-23 | End: 2020-12-21 | Stop reason: SDUPTHER

## 2020-11-23 NOTE — PROGRESS NOTES
Subjective:       Patient ID: Corina Liu is a 35 y.o. female.    Chief Complaint: Establish Care (history of chronic back pain, and joint swelling, pt requesting flu vaccine, frequent urination, uncontrollable bladder) and Joint Swelling (possible growth)    Pt is a 36 yo female that presents to the clinic today for establishment of care.  She states that she needs to get back on her thyroid medicine as she has been off of it for a couple of years.  She had a thyroidectomy in 2013.  She believes her last dose of synthroid was 175 mcg and that she was on brand synthroid.    Pt also c/o of pain on her R wrist.  She states that this cyst has been present x 2 years where it started as a pea-sized spot and that it has increased in size and has caused pain in her R hand that can also shoot up her right arm.    She is also complaining of neuropathy in her L foot and toes.  She does any traumatic events to site.    Pt is also c/o of urgency with urination.  States that she has to wear pads when she is working because she can't hold her bladder.  UA completed negative.  Encouraged kegal exercises.    Pt is also requesting information on quitting smoking.  She was hoping to try chantix but concern that it is not covered under her insurance, thus will refer her to [facility] smoking cessation program.    She is also asking if she can be on vyvanse for binge eating.  She states that her sister has been on this medication as prescribed for binge eating and has lost weight as a result.    Health maintenance discussed with patient.  She is agreeable to flu shot today.    Past Medical History:   Diagnosis Date    Cancer     Thyroid    Gastro-esophageal reflux     Hepatitis C 08/2016    History of PCOS     Hyperthyroidism     Hypoglycemia     PTSD (post-traumatic stress disorder) 2020    Tachycardia     Thyroid disease     hypothyroid, pappilary carcinoma       Past Surgical History:   Procedure Laterality Date      SECTION      x3    HYSTERECTOMY       partical     lymthnodes      from the right arm     thyroid nodule biopsy      THYROIDECTOMY      10/17/13    TONSILLECTOMY, ADENOIDECTOMY      TUBAL LIGATION          Social History     Socioeconomic History    Marital status:      Spouse name: Not on file    Number of children: Not on file    Years of education: Not on file    Highest education level: Not on file   Occupational History    Not on file   Social Needs    Financial resource strain: Not on file    Food insecurity     Worry: Not on file     Inability: Not on file    Transportation needs     Medical: Not on file     Non-medical: Not on file   Tobacco Use    Smoking status: Current Every Day Smoker     Packs/day: 1.00     Types: Cigarettes    Smokeless tobacco: Never Used   Substance and Sexual Activity    Alcohol use: Yes     Comment: socialy     Drug use: No     Comment: Prescription Methadone    Sexual activity: Yes     Partners: Male     Birth control/protection: Surgical, None   Lifestyle    Physical activity     Days per week: Not on file     Minutes per session: Not on file    Stress: Not on file   Relationships    Social connections     Talks on phone: Not on file     Gets together: Not on file     Attends Congregation service: Not on file     Active member of club or organization: Not on file     Attends meetings of clubs or organizations: Not on file     Relationship status: Not on file   Other Topics Concern    Not on file   Social History Narrative    Not on file       Family History   Problem Relation Age of Onset    Breast cancer Maternal Grandmother     Ovarian cancer Maternal Grandmother     Cataracts Maternal Grandmother     Cancer Mother         VULVAR AND CERVICAL    Ovarian cancer Mother     Colon cancer Neg Hx        Review of patient's allergies indicates:   Allergen Reactions    Levaquin [levofloxacin] Swelling and Rash     Joint pain, abdominal  pain          Current Outpatient Medications:     albuterol (PROVENTIL/VENTOLIN HFA) 90 mcg/actuation inhaler, Inhale 2 puffs into the lungs every 6 (six) hours as needed for Wheezing. Rescue (Patient not taking: Reported on 11/14/2019), Disp: 1 Inhaler, Rfl: 2    albuterol-ipratropium 2.5mg-0.5mg/3mL (DUO-NEB) 0.5 mg-3 mg(2.5 mg base)/3 mL nebulizer solution, Take 3 mLs by nebulization every 4 (four) hours. Rescue, Disp: 50 vial, Rfl: 2    ALPRAZolam (XANAX) 1 MG tablet, Take 1 tablet (1 mg total) by mouth nightly as needed for Anxiety. (Patient not taking: Reported on 11/11/2019), Disp: 30 tablet, Rfl: 0    azithromycin (ZITHROMAX) 250 MG tablet, Take 2 tablets (500 mg) on  Day 1,  followed by 1 tablet (250 mg) once daily on Days 2 through 5. (Patient not taking: Reported on 11/11/2019), Disp: 6 tablet, Rfl: 0    cetirizine (ZYRTEC) 10 MG tablet, Take 1 tablet (10 mg total) by mouth once daily., Disp: 30 tablet, Rfl: 11    dexchlorphen-phenylephrine-DM (POLYTUSSIN DM) 1-5-10 mg/5 mL Syrp, Take 5 mLs by mouth every 4 (four) hours as needed. (Patient not taking: Reported on 11/11/2019), Disp: 120 mL, Rfl: 0    dexchlorphen-phenylephrine-DM (POLYTUSSIN DM) 1-5-10 mg/5 mL Syrp, Take 5 mLs by mouth every 4 (four) hours as needed. (Patient not taking: Reported on 11/11/2019), Disp: 120 mL, Rfl: 0    dexchlorphen-phenylephrine-DM (POLYTUSSIN DM) 1-5-10 mg/5 mL Syrp, Take 5 mLs by mouth every 4 (four) hours as needed. (Patient not taking: Reported on 11/23/2020), Disp: 120 mL, Rfl: 0    diclofenac (VOLTAREN) 50 MG EC tablet, Take 1 tablet (50 mg total) by mouth 3 (three) times daily as needed (pain). (Patient not taking: Reported on 11/23/2020), Disp: 15 tablet, Rfl: 0    fluticasone propionate (FLONASE) 50 mcg/actuation nasal spray, 2 sprays (100 mcg total) by Each Nare route once daily. (Patient not taking: Reported on 11/11/2019), Disp: 15.8 mL, Rfl: 0    fluticasone propionate (FLONASE) 50 mcg/actuation  nasal spray, 2 sprays (100 mcg total) by Each Nostril route once daily. (Patient not taking: Reported on 11/11/2019), Disp: 15.8 mL, Rfl: 0    fluticasone propionate (FLONASE) 50 mcg/actuation nasal spray, 2 sprays (100 mcg total) by Each Nostril route once daily. (Patient not taking: Reported on 11/23/2020), Disp: 15.8 mL, Rfl: 0    glucagon (human recombinant) inj 1mg/mL kit, Inject as per directions IM (Patient not taking: Reported on 11/11/2019), Disp: 1 kit, Rfl: 3    ibuprofen (ADVIL,MOTRIN) 800 MG tablet, Take 1 tablet (800 mg total) by mouth every 6 (six) hours as needed for Pain. (Patient not taking: Reported on 11/11/2019), Disp: 20 tablet, Rfl: 0    levothyroxine (SYNTHROID) 137 MCG Tab tablet, Take 1 tablet (137 mcg total) by mouth before breakfast., Disp: 90 tablet, Rfl: 0    lidocaine (LIDODERM) 5 %, Place 1 patch onto the skin once daily. Remove & Discard patch within 12 hours or as directed by MD (Patient not taking: Reported on 11/11/2019), Disp: 30 patch, Rfl: 0    potassium chloride SA (K-DUR,KLOR-CON) 20 MEQ tablet, Take 1 tablet (20 mEq total) by mouth once daily. (Patient not taking: Reported on 11/11/2019), Disp: 7 tablet, Rfl: 0    QUEtiapine (SEROQUEL) 200 MG Tab, Take by mouth., Disp: , Rfl:     trazodone (DESYREL) 150 MG tablet, Take 150 mg by mouth every evening., Disp: , Rfl:     HPI  Review of Systems   Constitutional: Negative.    HENT: Negative.    Eyes: Negative.    Respiratory: Negative.    Cardiovascular: Negative.    Gastrointestinal: Negative.    Endocrine: Negative.    Genitourinary: Positive for frequency and urgency. Negative for decreased urine volume, difficulty urinating and enuresis.        Urinary incontinence   Musculoskeletal: Positive for back pain and joint swelling.        R wrist   Skin: Negative.    Allergic/Immunologic: Negative.    Neurological: Positive for numbness.        L foot   Hematological: Negative.    Psychiatric/Behavioral: The patient is  hyperactive.        Objective:      Physical Exam  Musculoskeletal:      Right shoulder: She exhibits pain. She exhibits normal range of motion, no tenderness, no swelling and no effusion.        Arms:    Skin:     Findings: No abrasion, abscess, acne, bruising, burn, ecchymosis, erythema, signs of injury, laceration, lesion, petechiae, rash or wound.             Comments: Ganglion cyst observed         Assessment:       1. Urge incontinence of urine    2. Ganglion of right wrist    3. Postoperative hypothyroidism    4. Encounter for smoking cessation counseling    5. Encounter for screening for HIV    6. Encounter for hepatitis C screening test for low risk patient    7. H/O thyroidectomy    8. Neuropathy    9. Influenza vaccine needed    10. Vaccine for streptococcus pneumoniae and influenza    11. Diphtheria, tetanus, acellular pertussis, and inactivated poliovirus vaccine (DTaP-IPV) administered        Plan:     1.  UA completed in clinic negative  2.  Follow-up with ganglion of cyst.  Pt acknowledges to continue to monitor site and when pain is no longer manageable we will refer for removal  3.  US of neck ordered d/t hx of thyroidectomy  4.  Lab work ordered  5.  Referral to smoking cessation program  6.  Influenza vaccine, td and ppsv23 in clinic today  7.  vyvanse ordered for binge eating  8.  RTC in 1 month to discuss all results    Urge incontinence of urine    Ganglion of right wrist    Postoperative hypothyroidism    Encounter for smoking cessation counseling    Encounter for screening for HIV    Encounter for hepatitis C screening test for low risk patient    H/O thyroidectomy    Neuropathy    Influenza vaccine needed    Vaccine for streptococcus pneumoniae and influenza    Diphtheria, tetanus, acellular pertussis, and inactivated poliovirus vaccine (DTaP-IPV) administered        Risks, benefits, and side effects were discussed with the patient. All questions were answered to the fullest satisfaction of  the patient, and pt verbalized understanding and agreement to treatment plan. Pt was to call with any new or worsening symptoms, or present to the ER.    ..I have seen the patient, reviewed the nurse practitioner student assessment.  I have personally interviewed and examined the patient and agree with the findings.

## 2020-11-30 ENCOUNTER — CLINICAL SUPPORT (OUTPATIENT)
Dept: SMOKING CESSATION | Facility: CLINIC | Age: 35
End: 2020-11-30
Payer: MEDICAID

## 2020-11-30 DIAGNOSIS — F17.210 HEAVY SMOKER (MORE THAN 20 CIGARETTES PER DAY): Primary | ICD-10-CM

## 2020-11-30 PROCEDURE — 99404 PR PREVENT COUNSEL,INDIV,60 MIN: ICD-10-PCS | Mod: S$GLB,,, | Performed by: NURSE PRACTITIONER

## 2020-11-30 PROCEDURE — 99404 PREV MED CNSL INDIV APPRX 60: CPT | Mod: S$GLB,,, | Performed by: NURSE PRACTITIONER

## 2020-11-30 RX ORDER — VARENICLINE TARTRATE 0.5 (11)-1
KIT ORAL
Qty: 53 TABLET | Refills: 0 | Status: SHIPPED | OUTPATIENT
Start: 2020-11-30 | End: 2020-12-21

## 2020-12-01 ENCOUNTER — TELEPHONE (OUTPATIENT)
Dept: FAMILY MEDICINE | Facility: CLINIC | Age: 35
End: 2020-12-01

## 2020-12-01 ENCOUNTER — HOSPITAL ENCOUNTER (OUTPATIENT)
Dept: RADIOLOGY | Facility: HOSPITAL | Age: 35
Discharge: HOME | End: 2020-12-01
Attending: NURSE PRACTITIONER
Payer: MEDICAID

## 2020-12-01 DIAGNOSIS — E89.0 H/O THYROIDECTOMY: ICD-10-CM

## 2020-12-01 DIAGNOSIS — R94.6 ABNORMAL THYROID SCAN: Primary | ICD-10-CM

## 2020-12-01 PROCEDURE — 76536 US EXAM OF HEAD AND NECK: CPT | Mod: TC

## 2020-12-01 PROCEDURE — 76536 US SOFT TISSUE HEAD NECK THYROID: ICD-10-PCS | Mod: 26,,, | Performed by: RADIOLOGY

## 2020-12-01 PROCEDURE — 76536 US EXAM OF HEAD AND NECK: CPT | Mod: 26,,, | Performed by: RADIOLOGY

## 2020-12-01 NOTE — TELEPHONE ENCOUNTER
Please notify pt her thyroid scan came back abnormal questioning either recurrent cancer or benign residual thyroid left. PET scan was recommended thus I ordered it.  Her labs are still pending results.

## 2020-12-01 NOTE — TELEPHONE ENCOUNTER
No, just make sure she understands they do not know what it is. If she wants to talk to me that is fine too.

## 2020-12-01 NOTE — TELEPHONE ENCOUNTER
Pt contacted office and was notified of abnormal results and the need to schedule a PET scan.  Advised PET scan will be completed in [place] or [facility] as we don't have one here.  Pt understood and will call scheduling to schedule test.

## 2020-12-02 ENCOUNTER — TELEPHONE (OUTPATIENT)
Dept: FAMILY MEDICINE | Facility: CLINIC | Age: 35
End: 2020-12-02

## 2020-12-02 DIAGNOSIS — E89.0 H/O TOTAL THYROIDECTOMY: ICD-10-CM

## 2020-12-02 DIAGNOSIS — Z85.850 HX OF THYROID CANCER: ICD-10-CM

## 2020-12-02 DIAGNOSIS — E07.9 THYROID GLAND DISEASE: Primary | ICD-10-CM

## 2020-12-03 NOTE — TELEPHONE ENCOUNTER
----- Message from Lorenza Bermudez MA sent at 12/1/2020  6:13 PM CST -----  Regarding: FW: advice  Contact: stephen with [facility] pet scan  Can you talk to Rachel about this please.   ----- Message -----  From: Donna Mcnally, Patient Care Assistant  Sent: 12/1/2020   3:53 PM CST  To: Herberth Guadarrama  Subject: advice                                           Type: Needs Medical Advice  Who Called:  stephen with [facility] pet scan  Best Call Back Number:  press 1 for scheduling   Additional Information:  stephen with [facility] pet scan states she would like a callback regarding a pay code for pet scan. Thanks!

## 2020-12-03 NOTE — TELEPHONE ENCOUNTER
I called Radiology at [facility] and received a paper Rachel filled out to have pts [place] PET scan done there . I sent everything over on 12/2/@ 4:31.  I was told they would schedule pt at 7:45 on 12/10

## 2020-12-03 NOTE — TELEPHONE ENCOUNTER
----- Message from Lise Whitehead sent at 12/3/2020 10:22 AM CST -----  Regarding: rx  Contact: self  Type:  RX Refill Request    Who Called:  self Refill or New Rx:  new rx   RX Name and Strength:    How is the patient currently taking it? (ex. 1XDay):   Is this a 30 day or 90 day RX:  30 day supply  Preferred Pharmacy with phone number:  [facility] [phone] (Phone)   Local or Mail Order:  local  Ordering Provider:  Alisa Woodall Call Back Number:  [phone]  Additional Information:  Patient had taken antibiotics, she now has a yeast infection, requesting a rx

## 2020-12-03 NOTE — TELEPHONE ENCOUNTER
Patient was placed on bactrim and keflex in the [facility] ED following a reaction to vaccines on 11/23. Patient states that she is now experiencing itching and irritation and is requesting meds for candida. Please advise.

## 2020-12-04 ENCOUNTER — OFFICE VISIT (OUTPATIENT)
Dept: FAMILY MEDICINE | Facility: CLINIC | Age: 35
End: 2020-12-04
Payer: MEDICAID

## 2020-12-04 ENCOUNTER — LAB VISIT (OUTPATIENT)
Dept: LAB | Facility: HOSPITAL | Age: 35
End: 2020-12-04
Attending: NURSE PRACTITIONER
Payer: MEDICAID

## 2020-12-04 VITALS
WEIGHT: 182 LBS | BODY MASS INDEX: 35.73 KG/M2 | HEART RATE: 90 BPM | HEIGHT: 60 IN | RESPIRATION RATE: 14 BRPM | DIASTOLIC BLOOD PRESSURE: 74 MMHG | SYSTOLIC BLOOD PRESSURE: 109 MMHG | TEMPERATURE: 98 F | OXYGEN SATURATION: 96 %

## 2020-12-04 DIAGNOSIS — R20.0 NUMBNESS OF LEFT FOOT: ICD-10-CM

## 2020-12-04 DIAGNOSIS — G57.92 NEUROPATHY OF FOOT, LEFT: ICD-10-CM

## 2020-12-04 DIAGNOSIS — Z11.59 NEED FOR HEPATITIS C SCREENING TEST: ICD-10-CM

## 2020-12-04 DIAGNOSIS — E83.39 ALKALINE PHOSPHATASE DEFICIENCY: ICD-10-CM

## 2020-12-04 DIAGNOSIS — E83.39 ALKALINE PHOSPHATASE DEFICIENCY: Primary | ICD-10-CM

## 2020-12-04 DIAGNOSIS — R93.1 ABNORMAL ECHOCARDIOGRAM: ICD-10-CM

## 2020-12-04 LAB — PHOSPHATE SERPL-MCNC: 2.7 MG/DL (ref 2.7–4.5)

## 2020-12-04 PROCEDURE — 84100 ASSAY OF PHOSPHORUS: CPT

## 2020-12-04 PROCEDURE — 99214 OFFICE O/P EST MOD 30 MIN: CPT | Mod: S$GLB,,, | Performed by: NURSE PRACTITIONER

## 2020-12-04 PROCEDURE — 84630 ASSAY OF ZINC: CPT

## 2020-12-04 PROCEDURE — 36415 COLL VENOUS BLD VENIPUNCTURE: CPT

## 2020-12-04 PROCEDURE — 99214 PR OFFICE/OUTPT VISIT, EST, LEVL IV, 30-39 MIN: ICD-10-PCS | Mod: S$GLB,,, | Performed by: NURSE PRACTITIONER

## 2020-12-04 PROCEDURE — 86803 HEPATITIS C AB TEST: CPT

## 2020-12-04 RX ORDER — LEVOTHYROXINE SODIUM 137 UG/1
137 TABLET ORAL
Qty: 90 TABLET | Refills: 0 | Status: SHIPPED | OUTPATIENT
Start: 2020-12-04 | End: 2021-03-22 | Stop reason: SDUPTHER

## 2020-12-04 RX ORDER — FLUCONAZOLE 150 MG/1
150 TABLET ORAL DAILY
Qty: 1 TABLET | Refills: 0 | Status: SHIPPED | OUTPATIENT
Start: 2020-12-04 | End: 2020-12-05

## 2020-12-04 NOTE — LETTER
Fax Transmission                                                                                                                                                       Date: December 4, 2020       To:  Joyce Light MD From: Rachel NgThomasmariumIRMA jacob   Fax:  [phone] Fax: [phone]   Phone:  [phone] Phone: [phone]     Special Instructions:     For questions or issues, please contact department listed on attached report.                            IF THERE ARE ANY PROBLEMS WITH THIS TRANSMISSION, PLEASE CALL IMMEDIATELY. THANK YOU    CONFIDENTIALITY NOTICE: The accompanying facsimile is intended solely for the use of the recipient designated above. Document(s) transmitted herewith may contain information that is confidential and privileged. Delivery, distribution of dissemination of this communication other than to the intended recipient is strictly prohibited. If you are another healthcare provider and have received this facsimile in error, please properly dispose and notify the sender. If you are NOT a healthcare provider and have received this facsimile in error, please notify [facility]s Compliance & Privacy Department immediately by email at [email or web address]

## 2020-12-04 NOTE — PROGRESS NOTES
"Subjective:       Patient ID: Corina Liu is a 35 y.o. female.    Chief Complaint: possible yeast infection    36 y/o female presents with c/o vaginal itching since she started on antibiotics, denies discharge.  She reports being here and receiving 2 injections in her right arm then a couple days later arm became red, hot and swollen. She was seen at [facility] ER prescribed abx.    In 2019 reports possible lateral infarct abnormal EKG and abnormal echo however she was also inpatient for being septic thus explained to her it may have been abnormal because of her condition yet, she is requesting a referral to Cardiology for general check up. All labs discussed as normal.     She is also requesting a referral to neurology for left foot numbness and when touched it feels like an electrical shock has gone through her.  She was reported to have an essentially normal lumbar CT in 2014 and degenerative changes noted on an abd CT 2019.     She is asking for a phosphors level due to brittle teeth and back pain and questions if she has a bone disease. She has not had health care in a while and states "I'm trying to get caught up on everything". She did not receive her synthroid yet thus re sent to another pharmacy as her TSH was in the 50's. She had a thyroidectomy and has not been on medication for 2 years. Discussed abnormal thyroid scan with recommended PET for findings concerning for residual thyroid parenchyma vs recurrent malignancy in thyroid bed  Past Medical History:   Diagnosis Date    Cancer     Thyroid    Gastro-esophageal reflux     Hepatitis C 2016    antibody positive only    History of PCOS     Hypoglycemia     Hypothyroidism (acquired)     Myocardial infarction     PTSD (post-traumatic stress disorder) 2020    Tachycardia     Thyroid disease     hypothyroid, pappilary carcinoma       Past Surgical History:   Procedure Laterality Date     SECTION      x3    HYSTERECTOMY      "  partical     lymthnodes      from the right arm     thyroid nodule biopsy      THYROIDECTOMY      10/17/13    TONSILLECTOMY, ADENOIDECTOMY      TUBAL LIGATION          Social History     Socioeconomic History    Marital status:      Spouse name: Not on file    Number of children: Not on file    Years of education: Not on file    Highest education level: Not on file   Occupational History    Not on file   Social Needs    Financial resource strain: Not on file    Food insecurity     Worry: Not on file     Inability: Not on file    Transportation needs     Medical: Not on file     Non-medical: Not on file   Tobacco Use    Smoking status: Current Every Day Smoker     Packs/day: 2.00     Years: 21.00     Pack years: 42.00     Types: Cigarettes    Smokeless tobacco: Never Used   Substance and Sexual Activity    Alcohol use: Yes     Comment: socialy     Drug use: No     Comment: Prescription Methadone    Sexual activity: Yes     Partners: Male     Birth control/protection: Surgical, None   Lifestyle    Physical activity     Days per week: Not on file     Minutes per session: Not on file    Stress: Not on file   Relationships    Social connections     Talks on phone: Not on file     Gets together: Not on file     Attends Sikh service: Not on file     Active member of club or organization: Not on file     Attends meetings of clubs or organizations: Not on file     Relationship status: Not on file   Other Topics Concern    Not on file   Social History Narrative    Not on file       Family History   Problem Relation Age of Onset    Breast cancer Maternal Grandmother     Ovarian cancer Maternal Grandmother     Cataracts Maternal Grandmother     Cancer Mother         VULVAR AND CERVICAL    Ovarian cancer Mother     Colon cancer Neg Hx        Review of patient's allergies indicates:   Allergen Reactions    Levaquin [levofloxacin] Swelling and Rash     Joint pain, abdominal pain           Current Outpatient Medications:     albuterol (PROVENTIL/VENTOLIN HFA) 90 mcg/actuation inhaler, Inhale 2 puffs into the lungs every 6 (six) hours as needed for Wheezing. Rescue, Disp: 18 g, Rfl: 5    lisdexamfetamine (VYVANSE) 50 MG capsule, Take 1 capsule (50 mg total) by mouth every morning., Disp: 30 capsule, Rfl: 0    varenicline (CHANTIX STARTING MONTH BOX) 0.5 mg (11)- 1 mg (42) tablet, Take one 0.5mg tablet by mouth once daily for 3 days,then increase to one 0.5mg tablet twice daily for 4 days,then increase to one 1mg tablet twice daily, Disp: 53 tablet, Rfl: 0    fluconazole (DIFLUCAN) 150 MG Tab, Take 1 tablet (150 mg total) by mouth once daily. for 1 day, Disp: 1 tablet, Rfl: 0    levothyroxine (SYNTHROID) 137 MCG Tab tablet, Take 1 tablet (137 mcg total) by mouth before breakfast., Disp: 90 tablet, Rfl: 0    HPI  Review of Systems   Constitutional: Negative.    HENT: Negative.    Eyes: Negative.    Respiratory: Negative.    Cardiovascular: Negative.    Gastrointestinal: Negative.    Endocrine: Negative.    Genitourinary: Negative.         Vaginal itching   Musculoskeletal: Positive for back pain.   Skin: Negative.    Allergic/Immunologic: Negative.    Neurological: Negative.         Numbness of left foot   Hematological: Negative.    Psychiatric/Behavioral: Negative.        Objective:      Physical Exam  Vitals signs reviewed.   Constitutional:       Appearance: Normal appearance. She is well-developed. She is obese.   HENT:      Head: Normocephalic.   Eyes:      Conjunctiva/sclera: Conjunctivae normal.      Pupils: Pupils are equal, round, and reactive to light.   Neck:      Musculoskeletal: Normal range of motion and neck supple.      Thyroid: No thyromegaly.   Cardiovascular:      Rate and Rhythm: Normal rate and regular rhythm.      Heart sounds: Normal heart sounds. No murmur.   Pulmonary:      Effort: Pulmonary effort is normal.      Breath sounds: Normal breath sounds. No wheezing or  rales.   Musculoskeletal: Normal range of motion.   Skin:     General: Skin is warm and dry.      Capillary Refill: Capillary refill takes less than 2 seconds.   Neurological:      Mental Status: She is alert and oriented to person, place, and time.   Psychiatric:         Mood and Affect: Mood normal.         Behavior: Behavior normal.         Thought Content: Thought content normal.         Judgment: Judgment normal.         Assessment:       1. Alkaline phosphatase deficiency    2. Need for hepatitis C screening test    3. Abnormal echocardiogram    4. Numbness of left foot    5. Neuropathy of foot, left        Plan:       1- refer to cardiology  2- non fasting labs  3- diflucan sent to the pharmacy  4- [place] [facility] [place]  5- synthroid re sent to the pharmacy.   -  Alkaline phosphatase deficiency  -     PHOSPHORUS; Future; Expected date: 12/04/2020  -     Zinc; Future; Expected date: 12/04/2020    Need for hepatitis C screening test  -     Hepatitis C Antibody; Future; Expected date: 12/04/2020    Abnormal echocardiogram  -     Ambulatory referral/consult to Cardiology; Future; Expected date: 12/11/2020    Numbness of left foot  -     Ambulatory referral/consult to Neurology; Future; Expected date: 12/11/2020    Neuropathy of foot, left  -     Ambulatory referral/consult to Neurology; Future; Expected date: 12/11/2020    Other orders  -     levothyroxine (SYNTHROID) 137 MCG Tab tablet; Take 1 tablet (137 mcg total) by mouth before breakfast.  Dispense: 90 tablet; Refill: 0  -     fluconazole (DIFLUCAN) 150 MG Tab; Take 1 tablet (150 mg total) by mouth once daily. for 1 day  Dispense: 1 tablet; Refill: 0        Risks, benefits, and side effects were discussed with the patient. All questions were answered to the fullest satisfaction of the patient, and pt verbalized understanding and agreement to treatment plan. Pt was to call with any new or worsening symptoms, or present to the ER.

## 2020-12-07 ENCOUNTER — CLINICAL SUPPORT (OUTPATIENT)
Dept: SMOKING CESSATION | Facility: CLINIC | Age: 35
End: 2020-12-07
Payer: MEDICAID

## 2020-12-07 DIAGNOSIS — F17.210 HEAVY SMOKER (MORE THAN 20 CIGARETTES PER DAY): Primary | ICD-10-CM

## 2020-12-07 LAB
HCV AB SERPL QL IA: POSITIVE
ZINC SERPL-MCNC: 70 UG/DL (ref 60–130)

## 2020-12-07 PROCEDURE — 99404 PREV MED CNSL INDIV APPRX 60: CPT | Mod: S$GLB,,, | Performed by: NURSE PRACTITIONER

## 2020-12-07 PROCEDURE — 99404 PR PREVENT COUNSEL,INDIV,60 MIN: ICD-10-PCS | Mod: S$GLB,,, | Performed by: NURSE PRACTITIONER

## 2020-12-07 NOTE — Clinical Note
Patient reports decreasing cigarette smoking from 2 packs per day for 21 years to a 1/2 pack per day. Patient blew into the Smokerlyzer and had a 64 CO2 with 0 to 5 CO2 being a normal range of a non smoker. She surely is smoking more than a 1/2 pack of cigarettes per day. We discussed and reviewed strategies, cues, and triggers. Introduced the negative impact of tobacco on health, the health advantages of discontinuing the use of tobacco, time line improved health changes after a quit, withdrawal issues to expect from nicotine and habit, and ways to achieve the goal of a quit. The patient remains on the prescribed tobacco cessation medication regimen of 1 mg Chantix BID without any negative side effects at this time. The patient denies any abnormal behavioral or mental changes at this time. The patient will continue with group therapy sessions and medication monitoring by CTTS. Prescribed medication management will be by physician.

## 2020-12-08 ENCOUNTER — TELEPHONE (OUTPATIENT)
Dept: FAMILY MEDICINE | Facility: CLINIC | Age: 35
End: 2020-12-08

## 2020-12-08 NOTE — TELEPHONE ENCOUNTER
Explained to pt that our pre-service team is working on the authorization for her appointment. They will fax the response directly to [facility]. Pt verbalized understanding.

## 2020-12-09 ENCOUNTER — TELEPHONE (OUTPATIENT)
Dept: FAMILY MEDICINE | Facility: CLINIC | Age: 35
End: 2020-12-09

## 2020-12-09 NOTE — TELEPHONE ENCOUNTER
12/09/2020  Spoke with Joan with [facility] PET scheduling. She will need the PA approval along with the orders faxed to [facility] scheduling. Fax to [facility] scheduling [phone]. Patient aware      ----- Message from Mini Mccray sent at 12/9/2020  8:34 AM CST -----  Regarding: order auth  Contact: [facility]  Type:     Who Called: Jael [facility]    Best Call Back Number: [phone] - direct to Jael    Additional Information:     Calling to find out status of authorization for pet scan scheduled for tomorrow. States [facility] needs to know by this am.

## 2020-12-09 NOTE — PROGRESS NOTES
Individual Follow-Up Form    12/7/20    Quit Date: none    Clinical Status of Patient: Outpatient    Length of Service: 60 minutes    Continuing Medication: yes  Chantix    Other Medications: none    Target Symptoms: Withdrawal and medication side effects. The following were  rated moderate (3) to severe (4) on TCRS:  · Moderate (3): none  · Severe (4): none    Comments: #2 Patient reports decreasing cigarette smoking from 2 packs per day for 21 years to a 1/2 pack per day. Patient blew into the Smokerlyzer and had a 64 CO2 with 0 to 5 CO2 being a normal range of a non smoker. She surely is smoking more than a 1/2 pack of cigarettes per day. We discussed and reviewed strategies, cues, and triggers. Introduced the negative impact of tobacco on health, the health advantages of discontinuing the use of tobacco, time line improved health changes after a quit, withdrawal issues to expect from nicotine and habit, and ways to achieve the goal of a quit. The patient remains on the prescribed tobacco cessation medication regimen of 1 mg Chantix BID without any negative side effects at this time. The patient denies any abnormal behavioral or mental changes at this time. The patient will continue with group therapy sessions and medication monitoring by CTTS. Prescribed medication management will be by physician.       Diagnosis: F17.210    Next Visit: 1 week    
29-Dec-2018 08:41

## 2020-12-14 ENCOUNTER — CLINICAL SUPPORT (OUTPATIENT)
Dept: SMOKING CESSATION | Facility: CLINIC | Age: 35
End: 2020-12-14

## 2020-12-14 DIAGNOSIS — F17.210 MODERATE CIGARETTE SMOKER (10-19 PER DAY): Primary | ICD-10-CM

## 2020-12-14 PROCEDURE — 99404 PREV MED CNSL INDIV APPRX 60: CPT | Mod: S$GLB,,, | Performed by: NURSE PRACTITIONER

## 2020-12-14 PROCEDURE — 99404 PR PREVENT COUNSEL,INDIV,60 MIN: ICD-10-PCS | Mod: S$GLB,,, | Performed by: NURSE PRACTITIONER

## 2020-12-14 NOTE — PROGRESS NOTES
Individual Follow-Up Form    12/14/2020    Quit Date: none    Clinical Status of Patient: Outpatient    Length of Service: 60 minutes    Continuing Medication: yes  Chantix    Other Medications: none     Target Symptoms: Withdrawal and medication side effects. The following were rated moderate (3) to severe (4) on TCRS:  · Moderate (3): none  · Severe (4): none    Comments: #3 Patient reports decreasing cigarette smoking from 2 packs per day for 21 years to <1 pack per day. We discussed and reviewed strategies, controlling environment, cues, triggers, new goals set. Introduced high risk situations with preparation interventions, caffeine similarities with withdrawal issues of habit and nicotine, alcohol, understanding urges, cravings, stress and relaxation. Open discussion with intervention discussion. The patient remains on the prescribed tobacco cessation medication regimen of 1 mg Chantix BID without any negative side effects at this time. The patient denies any abnormal behavioral or mental changes at this time. The patient will continue with group therapy sessions and medication monitoring by CTTS. Prescribed medication management will be by physician.       Diagnosis: F17.210    Next Visit: 1 week

## 2020-12-14 NOTE — Clinical Note
Patient reports decreasing cigarette smoking from 2 packs per day for 21 years to <1 pack per day. We discussed and reviewed strategies, controlling environment, cues, triggers, new goals set. Introduced high risk situations with preparation interventions, caffeine similarities with withdrawal issues of habit and nicotine, alcohol, understanding urges, cravings, stress and relaxation. Open discussion with intervention discussion. The patient remains on the prescribed tobacco cessation medication regimen of 1 mg Chantix BID without any negative side effects at this time. The patient denies any abnormal behavioral or mental changes at this time. The patient will continue with group therapy sessions and medication monitoring by CTTS. Prescribed medication management will be by physician.

## 2020-12-16 ENCOUNTER — TELEPHONE (OUTPATIENT)
Dept: FAMILY MEDICINE | Facility: CLINIC | Age: 35
End: 2020-12-16

## 2020-12-16 DIAGNOSIS — R76.8 POSITIVE HEPATITIS C ANTIBODY TEST: Primary | ICD-10-CM

## 2020-12-17 ENCOUNTER — TELEPHONE (OUTPATIENT)
Dept: FAMILY MEDICINE | Facility: CLINIC | Age: 35
End: 2020-12-17

## 2020-12-17 NOTE — TELEPHONE ENCOUNTER
----- Message from Deric Culp sent at 12/17/2020  3:12 PM CST -----  Regarding: F/U on order for  NM PET CT Routine Skull to Mid Thigh  Type: Needs Medical Advice    Who Called:  Ptnt  [phone]    Symptoms (please be specific): Thyroid gland disease [E07.9]  H/O total thyroidectomy [E89.0]  Hx of thyroid cancer [Z85.850]      Additional Information:     Advised needs to speak with the nurse about her PET order status. Please call.

## 2020-12-17 NOTE — TELEPHONE ENCOUNTER
Contacted patient, informed her that paperwork had been faxed to [facility] [place] and that she would have to contact them to find out status of appointment and insurance patient verbalized understanding

## 2020-12-18 ENCOUNTER — PATIENT MESSAGE (OUTPATIENT)
Dept: FAMILY MEDICINE | Facility: CLINIC | Age: 35
End: 2020-12-18

## 2020-12-21 ENCOUNTER — OFFICE VISIT (OUTPATIENT)
Dept: FAMILY MEDICINE | Facility: CLINIC | Age: 35
End: 2020-12-21
Payer: MEDICAID

## 2020-12-21 ENCOUNTER — CLINICAL SUPPORT (OUTPATIENT)
Dept: SMOKING CESSATION | Facility: CLINIC | Age: 35
End: 2020-12-21

## 2020-12-21 VITALS
SYSTOLIC BLOOD PRESSURE: 120 MMHG | RESPIRATION RATE: 14 BRPM | OXYGEN SATURATION: 95 % | HEART RATE: 97 BPM | WEIGHT: 182 LBS | HEIGHT: 60 IN | DIASTOLIC BLOOD PRESSURE: 82 MMHG | TEMPERATURE: 97 F | BODY MASS INDEX: 35.73 KG/M2

## 2020-12-21 DIAGNOSIS — Z01.419 ENCOUNTER FOR CERVICAL PAP SMEAR WITH PELVIC EXAM: Primary | ICD-10-CM

## 2020-12-21 DIAGNOSIS — Z11.3 SCREEN FOR STD (SEXUALLY TRANSMITTED DISEASE): ICD-10-CM

## 2020-12-21 DIAGNOSIS — Z85.850 HX OF THYROID CANCER: ICD-10-CM

## 2020-12-21 DIAGNOSIS — N63.13 LUMP IN LOWER OUTER QUADRANT OF RIGHT BREAST: ICD-10-CM

## 2020-12-21 DIAGNOSIS — F17.210 HEAVY SMOKER (MORE THAN 20 CIGARETTES PER DAY): Primary | ICD-10-CM

## 2020-12-21 DIAGNOSIS — N64.4 PAIN OF BOTH BREASTS: ICD-10-CM

## 2020-12-21 DIAGNOSIS — F50.81 BINGE EATING DISORDER: ICD-10-CM

## 2020-12-21 LAB
BILIRUB UR QL STRIP: NEGATIVE
CLARITY UR: CLEAR
COLOR UR: YELLOW
GLUCOSE UR QL STRIP: NEGATIVE
HGB UR QL STRIP: NEGATIVE
KETONES UR QL STRIP: NEGATIVE
LEUKOCYTE ESTERASE UR QL STRIP: NEGATIVE
NITRITE UR QL STRIP: NEGATIVE
PH UR STRIP: 6 [PH] (ref 5–8)
PROT UR QL STRIP: NEGATIVE
SP GR UR STRIP: 1.02 (ref 1–1.03)
URN SPEC COLLECT METH UR: NORMAL
UROBILINOGEN UR STRIP-ACNC: NEGATIVE EU/DL

## 2020-12-21 PROCEDURE — 99395 PR PREVENTIVE VISIT,EST,18-39: ICD-10-PCS | Mod: S$GLB,,, | Performed by: NURSE PRACTITIONER

## 2020-12-21 PROCEDURE — 99406 PR TOBACCO USE CESSATION INTERMEDIATE 3-10 MINUTES: ICD-10-PCS | Mod: S$GLB,,, | Performed by: NURSE PRACTITIONER

## 2020-12-21 PROCEDURE — 99395 PREV VISIT EST AGE 18-39: CPT | Mod: S$GLB,,, | Performed by: NURSE PRACTITIONER

## 2020-12-21 PROCEDURE — 81003 URINALYSIS AUTO W/O SCOPE: CPT

## 2020-12-21 PROCEDURE — 88175 CYTOPATH C/V AUTO FLUID REDO: CPT

## 2020-12-21 PROCEDURE — 99406 BEHAV CHNG SMOKING 3-10 MIN: CPT | Mod: S$GLB,,, | Performed by: NURSE PRACTITIONER

## 2020-12-21 RX ORDER — LISDEXAMFETAMINE DIMESYLATE 50 MG/1
50 CAPSULE ORAL EVERY MORNING
Qty: 30 CAPSULE | Refills: 0 | Status: SHIPPED | OUTPATIENT
Start: 2020-12-21 | End: 2021-01-22 | Stop reason: SDUPTHER

## 2020-12-21 NOTE — PROGRESS NOTES
"Individual Follow-Up Form    12/21/2020    Quit Date: none    Clinical Status of Patient: Outpatient    Length of Service: 15 minutes    Continuing Medication: no    Other Medications: none. She is no longer using Chantix.     Target Symptoms: Withdrawal and medication side effects. The following were rated moderate (3) to severe (4) on TCRS:  · Moderate (3): none  · Severe (4): none    Comments: Patient reports seeing her PCP today and discontinued Chantix. Patient shared after drinking alcohol with Chantix she had "awful headaches." She stated with the holiday she will be socializing to include consuming alcohol. The patient denies any abnormal behavioral or mental changes at this time.   The patient will continue with therapy sessions after the 1st of the year to see if she remains interested in resuming the smoking cessation program.     Diagnosis: F17.210    Next Visit: 1 week    "

## 2020-12-21 NOTE — Clinical Note
"Patient reports seeing her PCP today and discontinued Chantix. Patient shared after drinking alcohol with Chantix she had "awful headaches." She stated with the holiday she will be socializing to include consuming alcohol. The patient denies any abnormal behavioral or mental changes at this time. The patient will continue with therapy sessions after the 1st of the year to see if she remains interested in resuming the smoking cessation program.     "

## 2020-12-21 NOTE — PROGRESS NOTES
Subjective:       Patient ID: Corina Liu is a 35 y.o. female.    Chief Complaint: Gynecologic Exam  36 y/o female presents for one month follow up and a pap smear. She endorses having a hysterectomy  and not sure if the cervix was left. She denies Gyn c/o.     She did not undergo the pet scan as it was denied by insurance due to diagnosis code thus will order again due to recent thyroid scan with questionable residual thyroid parenchyma or recurrent neoplasm.     C/o right breast lump at 7 o'clock and left breast pain at 5 o'clock thus will order mammogram.    She tested positive for hep c antibody but the RNA was negative. She has uploaded the results into the portal.      Labs discussed as essentially normal. Her thyroid is abnormal yet she has only been on the synthroid for 2 weeks and has not taken synthroid in 2 years. Request for Vyvanse for binge eating disorder. Denies jitteriness, palpations or insomnia. Reports 4 pound weight loss.         Past Medical History:   Diagnosis Date    Cancer     Thyroid    Gastro-esophageal reflux     Hepatitis C 2016    antibody positive only    History of PCOS     Hypoglycemia     Hypothyroidism (acquired)     Myocardial infarction     PTSD (post-traumatic stress disorder) 2020    Tachycardia     Thyroid disease     hypothyroid, pappilary carcinoma       Past Surgical History:   Procedure Laterality Date     SECTION      x3    HYSTERECTOMY       partical     lymthnodes      from the right arm     thyroid nodule biopsy      THYROIDECTOMY      10/17/13    TONSILLECTOMY, ADENOIDECTOMY      TUBAL LIGATION          Social History     Socioeconomic History    Marital status:      Spouse name: Not on file    Number of children: Not on file    Years of education: Not on file    Highest education level: Not on file   Occupational History    Not on file   Social Needs    Financial resource strain: Not on file    Food  insecurity     Worry: Not on file     Inability: Not on file    Transportation needs     Medical: Not on file     Non-medical: Not on file   Tobacco Use    Smoking status: Current Every Day Smoker     Packs/day: 2.00     Years: 21.00     Pack years: 42.00     Types: Cigarettes    Smokeless tobacco: Never Used   Substance and Sexual Activity    Alcohol use: Yes     Comment: socialy     Drug use: No     Comment: Prescription Methadone    Sexual activity: Yes     Partners: Male     Birth control/protection: Surgical, None   Lifestyle    Physical activity     Days per week: Not on file     Minutes per session: Not on file    Stress: Not on file   Relationships    Social connections     Talks on phone: Not on file     Gets together: Not on file     Attends [employer] service: Not on file     Active member of club or organization: Not on file     Attends meetings of clubs or organizations: Not on file     Relationship status: Not on file   Other Topics Concern    Not on file   Social History Narrative    Not on file       Family History   Problem Relation Age of Onset    Breast cancer Maternal Grandmother     Ovarian cancer Maternal Grandmother     Cataracts Maternal Grandmother     Cancer Mother         VULVAR AND CERVICAL    Ovarian cancer Mother     Colon cancer Neg Hx        Review of patient's allergies indicates:   Allergen Reactions    Levaquin [levofloxacin] Swelling and Rash     Joint pain, abdominal pain          Current Outpatient Medications:     albuterol (PROVENTIL/VENTOLIN HFA) 90 mcg/actuation inhaler, Inhale 2 puffs into the lungs every 6 (six) hours as needed for Wheezing. Rescue, Disp: 18 g, Rfl: 5    levothyroxine (SYNTHROID) 137 MCG Tab tablet, Take 1 tablet (137 mcg total) by mouth before breakfast., Disp: 90 tablet, Rfl: 0    lisdexamfetamine (VYVANSE) 50 MG capsule, Take 1 capsule (50 mg total) by mouth every morning., Disp: 30 capsule, Rfl: 0    HPI  Review of Systems    Constitutional: Negative.         Breast pain and lump   HENT: Negative.    Eyes: Negative.    Respiratory: Negative.    Cardiovascular: Negative.    Gastrointestinal: Negative.    Endocrine: Negative.    Genitourinary: Negative.    Musculoskeletal: Negative.    Skin: Negative.    Allergic/Immunologic: Negative.    Neurological: Negative.    Hematological: Negative.    Psychiatric/Behavioral: Negative.        Objective:      Physical Exam  Exam conducted with a chaperone present (preethi ji MA).   Chest:      Breasts:         Right: Normal. No inverted nipple, mass, nipple discharge, skin change or tenderness.         Left: Normal. No inverted nipple, mass, nipple discharge, skin change or tenderness.          Comments: Normal breast exam. Lump felt at 7 o'clock by patient only.   Abdominal:      Hernia: There is no hernia in the left inguinal area.   Genitourinary:     Exam position: Lithotomy position.      Pubic Area: No rash or pubic lice.       Labia:         Right: No rash, tenderness, lesion or injury.         Left: No rash, tenderness, lesion or injury.       Urethra: No prolapse, urethral pain, urethral swelling or urethral lesion.      Vagina: Normal.      Cervix: Normal.      Uterus: Normal.       Adnexa: Right adnexa normal and left adnexa normal.      Rectum: Normal.   Lymphadenopathy:      Lower Body: No right inguinal adenopathy. No left inguinal adenopathy.         Assessment:       1. Encounter for cervical Pap smear with pelvic exam    2. Screen for STD (sexually transmitted disease)    3. Hx of thyroid cancer    4. Pain of both breasts    5. Lump in lower outer quadrant of right breast    6. Binge eating disorder        Plan:       1- PET reordered  2- mammogram for pain of left breast and suspected lump felt in right breast   3- urinalysis to check for trichomonas  4- RTC PRN  5- Vyyanse refill   -  Encounter for cervical Pap smear with pelvic exam  -     Liquid-Based Pap Smear, Screening  -      Cancel: HPV High Risk Genotypes, PCR    Screen for STD (sexually transmitted disease)  -     URINALYSIS    Hx of thyroid cancer  -     NM PET CT Routine Skull to Mid Thigh; Future; Expected date: 12/21/2020    Pain of both breasts  -     Mammo Digital Screening Bilat w/ Roberto; Future; Expected date: 01/21/2021    Lump in lower outer quadrant of right breast  -     Mammo Digital Screening Bilat w/ Roberto; Future; Expected date: 01/21/2021    Binge eating disorder  -     lisdexamfetamine (VYVANSE) 50 MG capsule; Take 1 capsule (50 mg total) by mouth every morning.  Dispense: 30 capsule; Refill: 0        Risks, benefits, and side effects were discussed with the patient. All questions were answered to the fullest satisfaction of the patient, and pt verbalized understanding and agreement to treatment plan. Pt was to call with any new or worsening symptoms, or present to the ER.

## 2020-12-24 LAB
FINAL PATHOLOGIC DIAGNOSIS: NORMAL
Lab: NORMAL

## 2021-01-05 ENCOUNTER — TELEPHONE (OUTPATIENT)
Dept: FAMILY MEDICINE | Facility: CLINIC | Age: 36
End: 2021-01-05

## 2021-01-05 DIAGNOSIS — R92.8 ABNORMAL MAMMOGRAM OF BOTH BREASTS: Primary | ICD-10-CM

## 2021-01-12 ENCOUNTER — TELEPHONE (OUTPATIENT)
Dept: SMOKING CESSATION | Facility: CLINIC | Age: 36
End: 2021-01-12

## 2021-01-25 ENCOUNTER — PATIENT MESSAGE (OUTPATIENT)
Dept: FAMILY MEDICINE | Facility: CLINIC | Age: 36
End: 2021-01-25

## 2021-01-25 ENCOUNTER — TELEPHONE (OUTPATIENT)
Dept: FAMILY MEDICINE | Facility: CLINIC | Age: 36
End: 2021-01-25

## 2021-01-25 DIAGNOSIS — F50.81 BINGE EATING DISORDER: ICD-10-CM

## 2021-01-25 RX ORDER — LISDEXAMFETAMINE DIMESYLATE 50 MG/1
50 CAPSULE ORAL EVERY MORNING
Qty: 30 CAPSULE | Refills: 0 | OUTPATIENT
Start: 2021-01-25

## 2021-01-25 RX ORDER — CYCLOBENZAPRINE HCL 10 MG
10 TABLET ORAL 2 TIMES DAILY PRN
COMMUNITY
Start: 2020-12-28 | End: 2021-04-13 | Stop reason: SINTOL

## 2021-01-25 RX ORDER — TRAMADOL HYDROCHLORIDE 50 MG/1
50 TABLET ORAL EVERY 6 HOURS PRN
COMMUNITY
Start: 2020-12-28 | End: 2021-08-12 | Stop reason: ALTCHOICE

## 2021-01-26 ENCOUNTER — PATIENT MESSAGE (OUTPATIENT)
Dept: FAMILY MEDICINE | Facility: CLINIC | Age: 36
End: 2021-01-26

## 2021-01-26 RX ORDER — LISDEXAMFETAMINE DIMESYLATE 50 MG/1
50 CAPSULE ORAL EVERY MORNING
Qty: 30 CAPSULE | Refills: 0 | OUTPATIENT
Start: 2021-01-26

## 2021-01-28 ENCOUNTER — HOSPITAL ENCOUNTER (OUTPATIENT)
Dept: RADIOLOGY | Facility: HOSPITAL | Age: 36
Discharge: HOME | End: 2021-01-28
Attending: FAMILY MEDICINE
Payer: MEDICAID

## 2021-01-28 ENCOUNTER — OFFICE VISIT (OUTPATIENT)
Dept: FAMILY MEDICINE | Facility: CLINIC | Age: 36
End: 2021-01-28
Payer: MEDICAID

## 2021-01-28 VITALS
OXYGEN SATURATION: 99 % | SYSTOLIC BLOOD PRESSURE: 109 MMHG | RESPIRATION RATE: 15 BRPM | HEIGHT: 60 IN | BODY MASS INDEX: 33.98 KG/M2 | HEART RATE: 80 BPM | DIASTOLIC BLOOD PRESSURE: 70 MMHG | WEIGHT: 173.06 LBS

## 2021-01-28 DIAGNOSIS — R05.8 PRODUCTIVE COUGH: Primary | ICD-10-CM

## 2021-01-28 DIAGNOSIS — R07.81 RIB PAIN ON RIGHT SIDE: ICD-10-CM

## 2021-01-28 DIAGNOSIS — R06.02 SHORTNESS OF BREATH: ICD-10-CM

## 2021-01-28 DIAGNOSIS — M79.10 MUSCLE PAIN: ICD-10-CM

## 2021-01-28 DIAGNOSIS — Z20.822 EXPOSURE TO COVID-19 VIRUS: ICD-10-CM

## 2021-01-28 DIAGNOSIS — R05.9 COUGH: ICD-10-CM

## 2021-01-28 DIAGNOSIS — R05.8 PRODUCTIVE COUGH: ICD-10-CM

## 2021-01-28 PROCEDURE — 99214 OFFICE O/P EST MOD 30 MIN: CPT | Mod: PBBFAC,25,PN | Performed by: FAMILY MEDICINE

## 2021-01-28 PROCEDURE — 99999 PR PBB SHADOW E&M-EST. PATIENT-LVL IV: CPT | Mod: PBBFAC,,, | Performed by: FAMILY MEDICINE

## 2021-01-28 PROCEDURE — 99215 OFFICE O/P EST HI 40 MIN: CPT | Mod: S$PBB,,, | Performed by: FAMILY MEDICINE

## 2021-01-28 PROCEDURE — 71100 XR RIBS 2 VIEW RIGHT: ICD-10-PCS | Mod: 26,RT,, | Performed by: RADIOLOGY

## 2021-01-28 PROCEDURE — U0003 INFECTIOUS AGENT DETECTION BY NUCLEIC ACID (DNA OR RNA); SEVERE ACUTE RESPIRATORY SYNDROME CORONAVIRUS 2 (SARS-COV-2) (CORONAVIRUS DISEASE [COVID-19]), AMPLIFIED PROBE TECHNIQUE, MAKING USE OF HIGH THROUGHPUT TECHNOLOGIES AS DESCRIBED BY CMS-2020-01-R: HCPCS

## 2021-01-28 PROCEDURE — 71100 X-RAY EXAM RIBS UNI 2 VIEWS: CPT | Mod: 26,RT,, | Performed by: RADIOLOGY

## 2021-01-28 PROCEDURE — 96372 THER/PROPH/DIAG INJ SC/IM: CPT | Mod: PBBFAC,PN

## 2021-01-28 PROCEDURE — 99215 PR OFFICE/OUTPT VISIT, EST, LEVL V, 40-54 MIN: ICD-10-PCS | Mod: S$PBB,,, | Performed by: FAMILY MEDICINE

## 2021-01-28 PROCEDURE — 71100 X-RAY EXAM RIBS UNI 2 VIEWS: CPT | Mod: TC,RT

## 2021-01-28 PROCEDURE — 71046 X-RAY EXAM CHEST 2 VIEWS: CPT | Mod: TC

## 2021-01-28 PROCEDURE — 71046 XR CHEST PA AND LATERAL: ICD-10-PCS | Mod: 26,,, | Performed by: RADIOLOGY

## 2021-01-28 PROCEDURE — 99999 PR PBB SHADOW E&M-EST. PATIENT-LVL IV: ICD-10-PCS | Mod: PBBFAC,,, | Performed by: FAMILY MEDICINE

## 2021-01-28 PROCEDURE — 71046 X-RAY EXAM CHEST 2 VIEWS: CPT | Mod: 26,,, | Performed by: RADIOLOGY

## 2021-01-28 RX ORDER — AZITHROMYCIN 250 MG/1
TABLET, FILM COATED ORAL
Qty: 6 TABLET | Refills: 0 | Status: SHIPPED | OUTPATIENT
Start: 2021-01-28 | End: 2021-02-02

## 2021-01-28 RX ORDER — METHOCARBAMOL 500 MG/1
500 TABLET, FILM COATED ORAL 4 TIMES DAILY PRN
Qty: 40 TABLET | Refills: 0 | Status: SHIPPED | OUTPATIENT
Start: 2021-01-28 | End: 2021-02-07

## 2021-01-28 RX ORDER — CEFTRIAXONE 1 G/1
1 INJECTION, POWDER, FOR SOLUTION INTRAMUSCULAR; INTRAVENOUS
Status: COMPLETED | OUTPATIENT
Start: 2021-01-28 | End: 2021-01-28

## 2021-01-28 RX ORDER — KETOROLAC TROMETHAMINE 30 MG/ML
60 INJECTION, SOLUTION INTRAMUSCULAR; INTRAVENOUS
Status: COMPLETED | OUTPATIENT
Start: 2021-01-28 | End: 2021-01-28

## 2021-01-28 RX ORDER — METHYLPREDNISOLONE 4 MG/1
TABLET ORAL
Qty: 1 PACKAGE | Refills: 0 | Status: SHIPPED | OUTPATIENT
Start: 2021-01-28 | End: 2021-02-15

## 2021-01-28 RX ADMIN — KETOROLAC TROMETHAMINE 60 MG: 30 INJECTION, SOLUTION INTRAMUSCULAR; INTRAVENOUS at 01:01

## 2021-01-28 RX ADMIN — CEFTRIAXONE SODIUM 1 G: 500 INJECTION, POWDER, FOR SOLUTION INTRAMUSCULAR; INTRAVENOUS at 01:01

## 2021-01-29 LAB — SARS-COV-2 RNA RESP QL NAA+PROBE: NOT DETECTED

## 2021-02-01 ENCOUNTER — OFFICE VISIT (OUTPATIENT)
Dept: CARDIOLOGY | Facility: CLINIC | Age: 36
End: 2021-02-01
Payer: MEDICAID

## 2021-02-01 VITALS
DIASTOLIC BLOOD PRESSURE: 79 MMHG | TEMPERATURE: 98 F | HEIGHT: 60 IN | WEIGHT: 171 LBS | OXYGEN SATURATION: 96 % | SYSTOLIC BLOOD PRESSURE: 120 MMHG | RESPIRATION RATE: 17 BRPM | HEART RATE: 100 BPM | BODY MASS INDEX: 33.57 KG/M2

## 2021-02-01 DIAGNOSIS — Z78.9 EXCESSIVE CAFFEINE INTAKE: ICD-10-CM

## 2021-02-01 DIAGNOSIS — F17.210 CIGARETTE NICOTINE DEPENDENCE WITHOUT COMPLICATION: ICD-10-CM

## 2021-02-01 DIAGNOSIS — R79.89 ELEVATED TSH: ICD-10-CM

## 2021-02-01 DIAGNOSIS — E78.5 DYSLIPIDEMIA (HIGH LDL; LOW HDL): ICD-10-CM

## 2021-02-01 DIAGNOSIS — E65 ABDOMINAL OBESITY: ICD-10-CM

## 2021-02-01 DIAGNOSIS — R93.1 ABNORMAL ECHOCARDIOGRAM: Primary | ICD-10-CM

## 2021-02-01 DIAGNOSIS — F12.20 MARIJUANA DEPENDENCE: ICD-10-CM

## 2021-02-01 DIAGNOSIS — Z82.49 FAMILY HISTORY OF PREMATURE CAD: ICD-10-CM

## 2021-02-01 PROCEDURE — 99214 OFFICE O/P EST MOD 30 MIN: CPT | Mod: PBBFAC,25 | Performed by: INTERNAL MEDICINE

## 2021-02-01 PROCEDURE — 99205 OFFICE O/P NEW HI 60 MIN: CPT | Mod: S$PBB,25,, | Performed by: INTERNAL MEDICINE

## 2021-02-01 PROCEDURE — 99999 PR PBB SHADOW E&M-EST. PATIENT-LVL IV: ICD-10-PCS | Mod: PBBFAC,,, | Performed by: INTERNAL MEDICINE

## 2021-02-01 PROCEDURE — 93010 ELECTROCARDIOGRAM REPORT: CPT | Mod: ,,, | Performed by: INTERNAL MEDICINE

## 2021-02-01 PROCEDURE — 99999 PR PBB SHADOW E&M-EST. PATIENT-LVL IV: CPT | Mod: PBBFAC,,, | Performed by: INTERNAL MEDICINE

## 2021-02-01 PROCEDURE — 93010 EKG 12-LEAD: ICD-10-PCS | Mod: ,,, | Performed by: INTERNAL MEDICINE

## 2021-02-01 PROCEDURE — 99205 PR OFFICE/OUTPT VISIT, NEW, LEVL V, 60-74 MIN: ICD-10-PCS | Mod: S$PBB,25,, | Performed by: INTERNAL MEDICINE

## 2021-02-02 ENCOUNTER — HOSPITAL ENCOUNTER (OUTPATIENT)
Dept: RADIOLOGY | Facility: HOSPITAL | Age: 36
Discharge: HOME | End: 2021-02-02
Attending: NURSE PRACTITIONER
Payer: MEDICAID

## 2021-02-02 DIAGNOSIS — R92.8 ABNORMAL MAMMOGRAM OF BOTH BREASTS: ICD-10-CM

## 2021-02-02 DIAGNOSIS — N63.10 BREAST MASS, RIGHT: ICD-10-CM

## 2021-02-02 DIAGNOSIS — N63.0 PALPABLE MASS OF BREAST: ICD-10-CM

## 2021-02-02 PROCEDURE — 76642 ULTRASOUND BREAST LIMITED: CPT | Mod: TC,RT

## 2021-02-02 PROCEDURE — 77066 DX MAMMO INCL CAD BI: CPT | Mod: 26,,, | Performed by: RADIOLOGY

## 2021-02-02 PROCEDURE — 77066 MAMMO DIGITAL DIAGNOSTIC BILAT WITH TOMO: ICD-10-PCS | Mod: 26,,, | Performed by: RADIOLOGY

## 2021-02-02 PROCEDURE — 76642 ULTRASOUND BREAST LIMITED: CPT | Mod: 26,RT,, | Performed by: RADIOLOGY

## 2021-02-02 PROCEDURE — 76642 US BREAST RIGHT LIMITED: ICD-10-PCS | Mod: 26,RT,, | Performed by: RADIOLOGY

## 2021-02-02 PROCEDURE — 77062 MAMMO DIGITAL DIAGNOSTIC BILAT WITH TOMO: ICD-10-PCS | Mod: 26,,, | Performed by: RADIOLOGY

## 2021-02-02 PROCEDURE — 77062 BREAST TOMOSYNTHESIS BI: CPT | Mod: 26,,, | Performed by: RADIOLOGY

## 2021-02-02 PROCEDURE — 77066 DX MAMMO INCL CAD BI: CPT | Mod: TC

## 2021-02-15 ENCOUNTER — TELEPHONE (OUTPATIENT)
Dept: FAMILY MEDICINE | Facility: CLINIC | Age: 36
End: 2021-02-15

## 2021-02-15 ENCOUNTER — OFFICE VISIT (OUTPATIENT)
Dept: FAMILY MEDICINE | Facility: CLINIC | Age: 36
End: 2021-02-15
Payer: MEDICAID

## 2021-02-15 VITALS
DIASTOLIC BLOOD PRESSURE: 77 MMHG | HEART RATE: 85 BPM | RESPIRATION RATE: 16 BRPM | TEMPERATURE: 97 F | WEIGHT: 166.81 LBS | HEIGHT: 60 IN | BODY MASS INDEX: 32.75 KG/M2 | SYSTOLIC BLOOD PRESSURE: 109 MMHG | OXYGEN SATURATION: 96 %
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
## 2021-02-15 DIAGNOSIS — R52 GENERALIZED PAIN: ICD-10-CM

## 2021-02-15 RX ORDER — PREDNISONE 20 MG/1
TABLET ORAL
Qty: 22 TABLET | Refills: 0 | Status: SHIPPED | OUTPATIENT
Start: 2021-02-15 | End: 2021-03-11

## 2021-02-15 RX ORDER — IBUPROFEN 800 MG/1
800 TABLET ORAL 3 TIMES DAILY
Qty: 30 TABLET | Refills: 1 | Status: SHIPPED | OUTPATIENT
Start: 2021-02-15 | End: 2021-02-25

## 2021-02-24 DIAGNOSIS — F50.81 BINGE EATING DISORDER: ICD-10-CM

## 2021-02-26 DIAGNOSIS — F50.81 BINGE EATING DISORDER: ICD-10-CM

## 2021-02-27 RX ORDER — LISDEXAMFETAMINE DIMESYLATE 50 MG/1
50 CAPSULE ORAL EVERY MORNING
Qty: 30 CAPSULE | Refills: 0 | OUTPATIENT
Start: 2021-02-27

## 2021-02-27 RX ORDER — LISDEXAMFETAMINE DIMESYLATE 50 MG/1
50 CAPSULE ORAL EVERY MORNING
Qty: 30 CAPSULE | Refills: 0 | Status: SHIPPED | OUTPATIENT
Start: 2021-02-27 | End: 2021-03-31 | Stop reason: SDUPTHER

## 2021-03-01 ENCOUNTER — TELEPHONE (OUTPATIENT)
Dept: FAMILY MEDICINE | Facility: CLINIC | Age: 36
End: 2021-03-01

## 2021-03-02 ENCOUNTER — PATIENT MESSAGE (OUTPATIENT)
Dept: FAMILY MEDICINE | Facility: CLINIC | Age: 36
End: 2021-03-02

## 2021-03-02 ENCOUNTER — TELEPHONE (OUTPATIENT)
Dept: FAMILY MEDICINE | Facility: CLINIC | Age: 36
End: 2021-03-02

## 2021-03-04 ENCOUNTER — PATIENT MESSAGE (OUTPATIENT)
Dept: FAMILY MEDICINE | Facility: CLINIC | Age: 36
End: 2021-03-04

## 2021-03-11 ENCOUNTER — DOCUMENTATION ONLY (OUTPATIENT)
Dept: FAMILY MEDICINE | Facility: CLINIC | Age: 36
End: 2021-03-11

## 2021-03-11 ENCOUNTER — OFFICE VISIT (OUTPATIENT)
Dept: FAMILY MEDICINE | Facility: CLINIC | Age: 36
End: 2021-03-11
Payer: MEDICAID

## 2021-03-11 VITALS
WEIGHT: 174.81 LBS | HEIGHT: 60 IN | TEMPERATURE: 98 F | HEART RATE: 104 BPM | DIASTOLIC BLOOD PRESSURE: 81 MMHG | BODY MASS INDEX: 34.32 KG/M2 | RESPIRATION RATE: 16 BRPM | OXYGEN SATURATION: 96 % | SYSTOLIC BLOOD PRESSURE: 129 MMHG

## 2021-03-11 DIAGNOSIS — N39.46 MIXED STRESS AND URGE URINARY INCONTINENCE: Primary | Chronic | ICD-10-CM

## 2021-03-11 DIAGNOSIS — K64.9 HEMORRHOIDS, UNSPECIFIED HEMORRHOID TYPE: ICD-10-CM

## 2021-03-11 PROCEDURE — 99214 PR OFFICE/OUTPT VISIT, EST, LEVL IV, 30-39 MIN: ICD-10-PCS | Mod: S$PBB,,, | Performed by: FAMILY MEDICINE

## 2021-03-11 PROCEDURE — 99214 OFFICE O/P EST MOD 30 MIN: CPT | Mod: PBBFAC,PN | Performed by: FAMILY MEDICINE

## 2021-03-11 PROCEDURE — 99999 PR PBB SHADOW E&M-EST. PATIENT-LVL IV: ICD-10-PCS | Mod: PBBFAC,,, | Performed by: FAMILY MEDICINE

## 2021-03-11 PROCEDURE — 99214 OFFICE O/P EST MOD 30 MIN: CPT | Mod: S$PBB,,, | Performed by: FAMILY MEDICINE

## 2021-03-11 PROCEDURE — 99999 PR PBB SHADOW E&M-EST. PATIENT-LVL IV: CPT | Mod: PBBFAC,,, | Performed by: FAMILY MEDICINE

## 2021-03-11 RX ORDER — GABAPENTIN 300 MG/1
CAPSULE ORAL
COMMUNITY
Start: 2021-03-01 | End: 2021-04-13 | Stop reason: SDUPTHER

## 2021-03-11 RX ORDER — OXYBUTYNIN CHLORIDE 15 MG/1
15 TABLET, EXTENDED RELEASE ORAL DAILY
Qty: 30 TABLET | Refills: 3 | Status: SHIPPED | OUTPATIENT
Start: 2021-03-11 | End: 2021-04-13 | Stop reason: SDUPTHER

## 2021-03-11 RX ORDER — DICYCLOMINE HYDROCHLORIDE 10 MG/1
10 CAPSULE ORAL 2 TIMES DAILY
Qty: 60 CAPSULE | Refills: 1 | Status: SHIPPED | OUTPATIENT
Start: 2021-03-11 | End: 2021-04-10

## 2021-03-11 RX ORDER — HYDROCODONE BITARTRATE AND ACETAMINOPHEN 7.5; 325 MG/1; MG/1
1 TABLET ORAL
COMMUNITY
Start: 2021-02-23 | End: 2021-08-12 | Stop reason: ALTCHOICE

## 2021-03-11 RX ORDER — DICLOFENAC SODIUM 75 MG/1
75 TABLET, DELAYED RELEASE ORAL 2 TIMES DAILY
COMMUNITY
Start: 2021-02-23 | End: 2022-08-03

## 2021-03-22 ENCOUNTER — PATIENT MESSAGE (OUTPATIENT)
Dept: FAMILY MEDICINE | Facility: CLINIC | Age: 36
End: 2021-03-22

## 2021-03-22 ENCOUNTER — LAB VISIT (OUTPATIENT)
Dept: LAB | Facility: HOSPITAL | Age: 36
End: 2021-03-22
Attending: NURSE PRACTITIONER
Payer: MEDICAID

## 2021-03-22 ENCOUNTER — OFFICE VISIT (OUTPATIENT)
Dept: FAMILY MEDICINE | Facility: CLINIC | Age: 36
End: 2021-03-22
Payer: MEDICAID

## 2021-03-22 VITALS
TEMPERATURE: 99 F | BODY MASS INDEX: 33.14 KG/M2 | OXYGEN SATURATION: 98 % | HEIGHT: 60 IN | RESPIRATION RATE: 16 BRPM | DIASTOLIC BLOOD PRESSURE: 73 MMHG | SYSTOLIC BLOOD PRESSURE: 106 MMHG | WEIGHT: 168.81 LBS | HEART RATE: 78 BPM

## 2021-03-22 DIAGNOSIS — R94.6 ABNORMAL THYROID FUNCTION TEST: Primary | ICD-10-CM

## 2021-03-22 DIAGNOSIS — E07.9 THYROID DISEASE: ICD-10-CM

## 2021-03-22 DIAGNOSIS — Z08 ENCOUNTER FOR FOLLOW-UP SURVEILLANCE OF THYROID CANCER: ICD-10-CM

## 2021-03-22 DIAGNOSIS — R19.8 RECTAL PRESSURE: ICD-10-CM

## 2021-03-22 DIAGNOSIS — C73 THYROID CANCER: ICD-10-CM

## 2021-03-22 DIAGNOSIS — Z85.850 ENCOUNTER FOR FOLLOW-UP SURVEILLANCE OF THYROID CANCER: ICD-10-CM

## 2021-03-22 DIAGNOSIS — R94.6 ABNORMAL THYROID FUNCTION TEST: ICD-10-CM

## 2021-03-22 DIAGNOSIS — R94.6 ABNORMAL THYROID SCAN: ICD-10-CM

## 2021-03-22 LAB
ALBUMIN SERPL BCP-MCNC: 4.4 G/DL (ref 3.5–5.2)
ALP SERPL-CCNC: 61 U/L (ref 55–135)
ALT SERPL W/O P-5'-P-CCNC: 22 U/L (ref 10–44)
ANION GAP SERPL CALC-SCNC: 11 MMOL/L (ref 8–16)
AST SERPL-CCNC: 22 U/L (ref 10–40)
BASOPHILS # BLD AUTO: 0.07 K/UL (ref 0–0.2)
BASOPHILS NFR BLD: 0.8 % (ref 0–1.9)
BILIRUB SERPL-MCNC: 0.9 MG/DL (ref 0.1–1)
BUN SERPL-MCNC: 11 MG/DL (ref 6–20)
CALCIUM SERPL-MCNC: 9.6 MG/DL (ref 8.7–10.5)
CHLORIDE SERPL-SCNC: 103 MMOL/L (ref 95–110)
CO2 SERPL-SCNC: 25 MMOL/L (ref 23–29)
CREAT SERPL-MCNC: 0.8 MG/DL (ref 0.5–1.4)
DIFFERENTIAL METHOD: ABNORMAL
EOSINOPHIL # BLD AUTO: 0.3 K/UL (ref 0–0.5)
EOSINOPHIL NFR BLD: 3.7 % (ref 0–8)
ERYTHROCYTE [DISTWIDTH] IN BLOOD BY AUTOMATED COUNT: 13 % (ref 11.5–14.5)
EST. GFR  (AFRICAN AMERICAN): >60 ML/MIN/1.73 M^2
EST. GFR  (NON AFRICAN AMERICAN): >60 ML/MIN/1.73 M^2
GLUCOSE SERPL-MCNC: 100 MG/DL (ref 70–110)
HCT VFR BLD AUTO: 42.3 % (ref 37–48.5)
HGB BLD-MCNC: 14.6 G/DL (ref 12–16)
IMM GRANULOCYTES # BLD AUTO: 0.02 K/UL (ref 0–0.04)
IMM GRANULOCYTES NFR BLD AUTO: 0.2 % (ref 0–0.5)
LYMPHOCYTES # BLD AUTO: 2.9 K/UL (ref 1–4.8)
LYMPHOCYTES NFR BLD: 34.9 % (ref 18–48)
MCH RBC QN AUTO: 31.5 PG (ref 27–31)
MCHC RBC AUTO-ENTMCNC: 34.5 G/DL (ref 32–36)
MCV RBC AUTO: 91 FL (ref 82–98)
MONOCYTES # BLD AUTO: 0.6 K/UL (ref 0.3–1)
MONOCYTES NFR BLD: 7.1 % (ref 4–15)
NEUTROPHILS # BLD AUTO: 4.4 K/UL (ref 1.8–7.7)
NEUTROPHILS NFR BLD: 53.3 % (ref 38–73)
NRBC BLD-RTO: 0 /100 WBC
PLATELET # BLD AUTO: 321 K/UL (ref 150–350)
PMV BLD AUTO: 11.1 FL (ref 9.2–12.9)
POTASSIUM SERPL-SCNC: 4.1 MMOL/L (ref 3.5–5.1)
PROT SERPL-MCNC: 7.3 G/DL (ref 6–8.4)
RBC # BLD AUTO: 4.64 M/UL (ref 4–5.4)
SODIUM SERPL-SCNC: 139 MMOL/L (ref 136–145)
T4 FREE SERPL-MCNC: 0.87 NG/DL (ref 0.71–1.51)
TSH SERPL DL<=0.005 MIU/L-ACNC: 0.22 UIU/ML (ref 0.34–5.6)
WBC # BLD AUTO: 8.29 K/UL (ref 3.9–12.7)

## 2021-03-22 PROCEDURE — 84439 ASSAY OF FREE THYROXINE: CPT | Performed by: NURSE PRACTITIONER

## 2021-03-22 PROCEDURE — 99214 OFFICE O/P EST MOD 30 MIN: CPT | Mod: S$GLB,,, | Performed by: NURSE PRACTITIONER

## 2021-03-22 PROCEDURE — 84443 ASSAY THYROID STIM HORMONE: CPT | Performed by: NURSE PRACTITIONER

## 2021-03-22 PROCEDURE — 36415 COLL VENOUS BLD VENIPUNCTURE: CPT | Performed by: NURSE PRACTITIONER

## 2021-03-22 PROCEDURE — 85025 COMPLETE CBC W/AUTO DIFF WBC: CPT | Performed by: NURSE PRACTITIONER

## 2021-03-22 PROCEDURE — 80053 COMPREHEN METABOLIC PANEL: CPT | Performed by: NURSE PRACTITIONER

## 2021-03-22 PROCEDURE — 99214 PR OFFICE/OUTPT VISIT, EST, LEVL IV, 30-39 MIN: ICD-10-PCS | Mod: S$GLB,,, | Performed by: NURSE PRACTITIONER

## 2021-03-22 RX ORDER — LEVOTHYROXINE SODIUM 137 UG/1
137 TABLET ORAL
Qty: 90 TABLET | Refills: 0 | Status: SHIPPED | OUTPATIENT
Start: 2021-03-22 | End: 2021-08-09 | Stop reason: SDUPTHER

## 2021-03-28 ENCOUNTER — TELEPHONE (OUTPATIENT)
Dept: URGENT CARE | Facility: CLINIC | Age: 36
End: 2021-03-28

## 2021-03-28 ENCOUNTER — PATIENT MESSAGE (OUTPATIENT)
Dept: FAMILY MEDICINE | Facility: CLINIC | Age: 36
End: 2021-03-28

## 2021-03-28 DIAGNOSIS — C73 MALIGNANT NEOPLASM OF THYROID GLAND: Primary | ICD-10-CM

## 2021-03-31 ENCOUNTER — LAB VISIT (OUTPATIENT)
Dept: LAB | Facility: HOSPITAL | Age: 36
End: 2021-03-31
Attending: NURSE PRACTITIONER
Payer: MEDICAID

## 2021-03-31 DIAGNOSIS — C73 MALIGNANT NEOPLASM OF THYROID GLAND: ICD-10-CM

## 2021-03-31 PROCEDURE — 36415 COLL VENOUS BLD VENIPUNCTURE: CPT | Performed by: NURSE PRACTITIONER

## 2021-03-31 PROCEDURE — 84432 ASSAY OF THYROGLOBULIN: CPT | Performed by: NURSE PRACTITIONER

## 2021-04-05 LAB
CLINICAL BIOCHEMIST REVIEW: ABNORMAL
THYROGLOB SERPL-MCNC: 3.4 NG/ML

## 2021-04-06 ENCOUNTER — OFFICE VISIT (OUTPATIENT)
Dept: SURGERY | Facility: CLINIC | Age: 36
End: 2021-04-06
Payer: MEDICAID

## 2021-04-06 ENCOUNTER — PATIENT MESSAGE (OUTPATIENT)
Dept: FAMILY MEDICINE | Facility: CLINIC | Age: 36
End: 2021-04-06

## 2021-04-06 VITALS
HEIGHT: 60 IN | SYSTOLIC BLOOD PRESSURE: 143 MMHG | DIASTOLIC BLOOD PRESSURE: 82 MMHG | TEMPERATURE: 98 F | BODY MASS INDEX: 33.38 KG/M2 | OXYGEN SATURATION: 96 % | HEART RATE: 96 BPM | WEIGHT: 170 LBS

## 2021-04-06 DIAGNOSIS — R19.8 RECTAL PRESSURE: ICD-10-CM

## 2021-04-06 DIAGNOSIS — M62.89 PELVIC FLOOR DYSFUNCTION: ICD-10-CM

## 2021-04-06 DIAGNOSIS — Z86.010 HISTORY OF COLON POLYPS: Primary | ICD-10-CM

## 2021-04-06 DIAGNOSIS — R79.89 ABNORMAL THYROID BLOOD TEST: Primary | ICD-10-CM

## 2021-04-06 DIAGNOSIS — Z01.818 PRE-OP TESTING: ICD-10-CM

## 2021-04-06 PROCEDURE — 99205 PR OFFICE/OUTPT VISIT, NEW, LEVL V, 60-74 MIN: ICD-10-PCS | Mod: S$GLB,CS,, | Performed by: SURGERY

## 2021-04-06 PROCEDURE — 99205 OFFICE O/P NEW HI 60 MIN: CPT | Mod: S$GLB,CS,, | Performed by: SURGERY

## 2021-04-06 RX ORDER — DIAZEPAM 5 MG/1
5 TABLET ORAL 3 TIMES DAILY
COMMUNITY
Start: 2021-03-02 | End: 2021-08-12 | Stop reason: ALTCHOICE

## 2021-04-06 RX ORDER — SODIUM CHLORIDE 9 MG/ML
INJECTION, SOLUTION INTRAVENOUS CONTINUOUS
Status: CANCELLED | OUTPATIENT
Start: 2021-04-06

## 2021-04-13 ENCOUNTER — TELEPHONE (OUTPATIENT)
Dept: FAMILY MEDICINE | Facility: CLINIC | Age: 36
End: 2021-04-13

## 2021-04-13 ENCOUNTER — OFFICE VISIT (OUTPATIENT)
Dept: FAMILY MEDICINE | Facility: CLINIC | Age: 36
End: 2021-04-13
Payer: MEDICAID

## 2021-04-13 VITALS
HEART RATE: 68 BPM | OXYGEN SATURATION: 97 % | BODY MASS INDEX: 33.63 KG/M2 | SYSTOLIC BLOOD PRESSURE: 115 MMHG | HEIGHT: 60 IN | RESPIRATION RATE: 18 BRPM | WEIGHT: 171.31 LBS | DIASTOLIC BLOOD PRESSURE: 73 MMHG

## 2021-04-13 DIAGNOSIS — S13.4XXS WHIPLASH INJURY TO NECK, SEQUELA: ICD-10-CM

## 2021-04-13 DIAGNOSIS — R77.8 HIGH SERUM THYROGLOBULIN: ICD-10-CM

## 2021-04-13 DIAGNOSIS — L68.0 HIRSUTISM: ICD-10-CM

## 2021-04-13 DIAGNOSIS — G62.9 PERIPHERAL POLYNEUROPATHY: ICD-10-CM

## 2021-04-13 DIAGNOSIS — L72.3 SEBACEOUS CYST: ICD-10-CM

## 2021-04-13 DIAGNOSIS — Z85.850 HISTORY OF THYROID CANCER: ICD-10-CM

## 2021-04-13 DIAGNOSIS — Z76.0 MEDICATION REFILL: Primary | ICD-10-CM

## 2021-04-13 PROBLEM — S13.4XXA WHIPLASH INJURIES: Status: ACTIVE | Noted: 2021-04-13

## 2021-04-13 PROBLEM — N12 PYELONEPHRITIS: Status: RESOLVED | Noted: 2019-03-10 | Resolved: 2021-04-13

## 2021-04-13 PROBLEM — A41.9 SEPSIS: Status: RESOLVED | Noted: 2019-03-10 | Resolved: 2021-04-13

## 2021-04-13 PROBLEM — F19.90 IV DRUG USER: Status: RESOLVED | Noted: 2019-03-10 | Resolved: 2021-04-13

## 2021-04-13 PROCEDURE — 99215 OFFICE O/P EST HI 40 MIN: CPT | Mod: PBBFAC,PN | Performed by: FAMILY MEDICINE

## 2021-04-13 PROCEDURE — 99999 PR PBB SHADOW E&M-EST. PATIENT-LVL V: CPT | Mod: PBBFAC,,, | Performed by: FAMILY MEDICINE

## 2021-04-13 PROCEDURE — 99999 PR PBB SHADOW E&M-EST. PATIENT-LVL V: ICD-10-PCS | Mod: PBBFAC,,, | Performed by: FAMILY MEDICINE

## 2021-04-13 PROCEDURE — 99214 PR OFFICE/OUTPT VISIT, EST, LEVL IV, 30-39 MIN: ICD-10-PCS | Mod: S$PBB,,, | Performed by: FAMILY MEDICINE

## 2021-04-13 PROCEDURE — 99214 OFFICE O/P EST MOD 30 MIN: CPT | Mod: S$PBB,,, | Performed by: FAMILY MEDICINE

## 2021-04-13 RX ORDER — GABAPENTIN 300 MG/1
600 CAPSULE ORAL NIGHTLY
Qty: 180 CAPSULE | Refills: 1 | Status: SHIPPED | OUTPATIENT
Start: 2021-04-13 | End: 2021-05-30 | Stop reason: SDUPTHER

## 2021-04-13 RX ORDER — METHOCARBAMOL 500 MG/1
500 TABLET, FILM COATED ORAL 4 TIMES DAILY
Qty: 56 TABLET | Refills: 0 | Status: SHIPPED | OUTPATIENT
Start: 2021-04-13 | End: 2021-04-27

## 2021-04-13 RX ORDER — OXYBUTYNIN CHLORIDE 15 MG/1
15 TABLET, EXTENDED RELEASE ORAL DAILY
Qty: 30 TABLET | Refills: 3 | Status: SHIPPED | OUTPATIENT
Start: 2021-04-13 | End: 2021-10-04 | Stop reason: SDUPTHER

## 2021-04-13 RX ORDER — SPIRONOLACTONE 25 MG/1
25 TABLET ORAL DAILY
Qty: 30 TABLET | Refills: 3 | Status: SHIPPED | OUTPATIENT
Start: 2021-04-13 | End: 2021-10-04 | Stop reason: SDUPTHER

## 2021-04-19 ENCOUNTER — LAB VISIT (OUTPATIENT)
Dept: LAB | Facility: HOSPITAL | Age: 36
End: 2021-04-19
Attending: FAMILY MEDICINE
Payer: MEDICAID

## 2021-04-19 DIAGNOSIS — G62.9 PERIPHERAL POLYNEUROPATHY: ICD-10-CM

## 2021-04-19 PROCEDURE — 86592 SYPHILIS TEST NON-TREP QUAL: CPT | Performed by: FAMILY MEDICINE

## 2021-04-19 PROCEDURE — 84207 ASSAY OF VITAMIN B-6: CPT | Performed by: FAMILY MEDICINE

## 2021-04-19 PROCEDURE — 82746 ASSAY OF FOLIC ACID SERUM: CPT | Performed by: FAMILY MEDICINE

## 2021-04-19 PROCEDURE — 82607 VITAMIN B-12: CPT | Performed by: FAMILY MEDICINE

## 2021-04-20 LAB
FOLATE SERPL-MCNC: 6.5 NG/ML (ref 4–24)
RPR SER QL: NORMAL
VIT B12 SERPL-MCNC: 185 PG/ML (ref 210–950)

## 2021-04-22 ENCOUNTER — TELEPHONE (OUTPATIENT)
Dept: FAMILY MEDICINE | Facility: CLINIC | Age: 36
End: 2021-04-22

## 2021-04-23 LAB — PYRIDOXAL SERPL-MCNC: 10 UG/L (ref 5–50)

## 2021-04-26 ENCOUNTER — CLINICAL SUPPORT (OUTPATIENT)
Dept: FAMILY MEDICINE | Facility: CLINIC | Age: 36
End: 2021-04-26
Payer: MEDICAID

## 2021-04-26 DIAGNOSIS — F50.81 BINGE EATING DISORDER: ICD-10-CM

## 2021-04-26 DIAGNOSIS — G62.9 PERIPHERAL POLYNEUROPATHY: Primary | ICD-10-CM

## 2021-04-26 PROCEDURE — 99212 OFFICE O/P EST SF 10 MIN: CPT | Mod: PBBFAC,PN

## 2021-04-26 PROCEDURE — 96372 THER/PROPH/DIAG INJ SC/IM: CPT | Mod: PBBFAC,PN

## 2021-04-26 PROCEDURE — 99999 PR PBB SHADOW E&M-EST. PATIENT-LVL II: ICD-10-PCS | Mod: PBBFAC,,,

## 2021-04-26 PROCEDURE — 99999 PR PBB SHADOW E&M-EST. PATIENT-LVL II: CPT | Mod: PBBFAC,,,

## 2021-04-26 RX ORDER — CYANOCOBALAMIN 1000 UG/ML
1000 INJECTION, SOLUTION INTRAMUSCULAR; SUBCUTANEOUS
Status: COMPLETED | OUTPATIENT
Start: 2021-04-26 | End: 2021-04-26

## 2021-04-26 RX ORDER — LISDEXAMFETAMINE DIMESYLATE 50 MG/1
50 CAPSULE ORAL EVERY MORNING
Qty: 30 CAPSULE | Refills: 0 | Status: SHIPPED | OUTPATIENT
Start: 2021-04-26 | End: 2021-05-31 | Stop reason: SDUPTHER

## 2021-04-26 RX ADMIN — CYANOCOBALAMIN 1000 MCG: 1000 INJECTION INTRAMUSCULAR; SUBCUTANEOUS at 08:04

## 2021-05-04 ENCOUNTER — TELEPHONE (OUTPATIENT)
Dept: SURGERY | Facility: CLINIC | Age: 36
End: 2021-05-04

## 2021-05-30 DIAGNOSIS — Z76.0 MEDICATION REFILL: ICD-10-CM

## 2021-05-30 DIAGNOSIS — R06.02 SHORTNESS OF BREATH: Primary | ICD-10-CM

## 2021-05-30 DIAGNOSIS — F50.81 BINGE EATING DISORDER: ICD-10-CM

## 2021-05-31 RX ORDER — ALBUTEROL SULFATE 90 UG/1
2 AEROSOL, METERED RESPIRATORY (INHALATION) EVERY 6 HOURS PRN
Qty: 18 G | Refills: 5 | Status: SHIPPED | OUTPATIENT
Start: 2021-05-31 | End: 2021-10-04 | Stop reason: SDUPTHER

## 2021-05-31 RX ORDER — GABAPENTIN 300 MG/1
600 CAPSULE ORAL NIGHTLY
Qty: 180 CAPSULE | Refills: 1 | Status: SHIPPED | OUTPATIENT
Start: 2021-05-31 | End: 2021-05-31 | Stop reason: SDUPTHER

## 2021-05-31 RX ORDER — GABAPENTIN 300 MG/1
600 CAPSULE ORAL NIGHTLY
Qty: 180 CAPSULE | Refills: 1 | Status: SHIPPED | OUTPATIENT
Start: 2021-05-31 | End: 2021-08-06 | Stop reason: SDUPTHER

## 2021-05-31 RX ORDER — LISDEXAMFETAMINE DIMESYLATE 50 MG/1
50 CAPSULE ORAL EVERY MORNING
Qty: 30 CAPSULE | Refills: 0 | Status: SHIPPED | OUTPATIENT
Start: 2021-05-31 | End: 2021-07-07 | Stop reason: SDUPTHER

## 2021-06-15 ENCOUNTER — CLINICAL SUPPORT (OUTPATIENT)
Dept: SMOKING CESSATION | Facility: CLINIC | Age: 36
End: 2021-06-15

## 2021-06-15 DIAGNOSIS — F17.210 MODERATE CIGARETTE SMOKER (10-19 PER DAY): Primary | ICD-10-CM

## 2021-06-15 PROCEDURE — 99406 PR TOBACCO USE CESSATION INTERMEDIATE 3-10 MINUTES: ICD-10-PCS | Mod: S$GLB,,,

## 2021-06-15 PROCEDURE — 99406 BEHAV CHNG SMOKING 3-10 MIN: CPT | Mod: S$GLB,,,

## 2021-08-02 ENCOUNTER — TELEPHONE (OUTPATIENT)
Dept: FAMILY MEDICINE | Facility: CLINIC | Age: 36
End: 2021-08-02

## 2021-08-06 DIAGNOSIS — E07.9 THYROID DISEASE: ICD-10-CM

## 2021-08-06 DIAGNOSIS — R94.6 ABNORMAL THYROID FUNCTION TEST: ICD-10-CM

## 2021-08-06 DIAGNOSIS — Z76.0 MEDICATION REFILL: ICD-10-CM

## 2021-08-06 DIAGNOSIS — F50.81 BINGE EATING DISORDER: ICD-10-CM

## 2021-08-06 RX ORDER — GABAPENTIN 300 MG/1
600 CAPSULE ORAL NIGHTLY
Qty: 180 CAPSULE | Refills: 1 | Status: SHIPPED | OUTPATIENT
Start: 2021-08-06 | End: 2021-10-04 | Stop reason: SDUPTHER

## 2021-08-06 RX ORDER — LISDEXAMFETAMINE DIMESYLATE 50 MG/1
50 CAPSULE ORAL EVERY MORNING
Qty: 30 CAPSULE | Refills: 0 | Status: SHIPPED | OUTPATIENT
Start: 2021-08-06 | End: 2021-09-07 | Stop reason: SDUPTHER

## 2021-08-09 RX ORDER — LEVOTHYROXINE SODIUM 137 UG/1
137 TABLET ORAL
Qty: 90 TABLET | Refills: 1 | Status: SHIPPED | OUTPATIENT
Start: 2021-08-09 | End: 2022-03-02 | Stop reason: SDUPTHER

## 2021-08-12 ENCOUNTER — OFFICE VISIT (OUTPATIENT)
Dept: FAMILY MEDICINE | Facility: CLINIC | Age: 36
End: 2021-08-12
Payer: MEDICAID

## 2021-08-12 DIAGNOSIS — E07.9 THYROID DISEASE: Primary | ICD-10-CM

## 2021-08-12 DIAGNOSIS — Z20.2 HPV EXPOSURE: ICD-10-CM

## 2021-08-12 DIAGNOSIS — R05.8 COUGH WITH EXPOSURE TO COVID-19 VIRUS: ICD-10-CM

## 2021-08-12 DIAGNOSIS — Z20.822 COUGH WITH EXPOSURE TO COVID-19 VIRUS: ICD-10-CM

## 2021-08-12 PROCEDURE — 99443 PR PHYSICIAN TELEPHONE EVALUATION 21-30 MIN: CPT | Mod: GT,,, | Performed by: FAMILY MEDICINE

## 2021-08-12 PROCEDURE — 99443 PR PHYSICIAN TELEPHONE EVALUATION 21-30 MIN: ICD-10-PCS | Mod: GT,,, | Performed by: FAMILY MEDICINE

## 2021-08-12 RX ORDER — AZITHROMYCIN 250 MG/1
TABLET, FILM COATED ORAL
Qty: 6 TABLET | Refills: 0 | Status: SHIPPED | OUTPATIENT
Start: 2021-08-12 | End: 2021-08-17

## 2021-09-07 DIAGNOSIS — F50.81 BINGE EATING DISORDER: ICD-10-CM

## 2021-09-08 RX ORDER — LISDEXAMFETAMINE DIMESYLATE 60 MG/1
60 CAPSULE ORAL EVERY MORNING
Qty: 30 CAPSULE | Refills: 0 | Status: SHIPPED | OUTPATIENT
Start: 2021-09-08 | End: 2021-10-04 | Stop reason: SDUPTHER

## 2021-09-14 ENCOUNTER — TELEPHONE (OUTPATIENT)
Dept: FAMILY MEDICINE | Facility: CLINIC | Age: 36
End: 2021-09-14

## 2021-09-22 ENCOUNTER — TELEPHONE (OUTPATIENT)
Dept: FAMILY MEDICINE | Facility: CLINIC | Age: 36
End: 2021-09-22
Payer: MEDICAID

## 2021-09-23 RX ORDER — CYANOCOBALAMIN 1000 UG/ML
1000 INJECTION, SOLUTION INTRAMUSCULAR; SUBCUTANEOUS
Status: SHIPPED | OUTPATIENT
Start: 2021-09-23 | End: 2022-02-20

## 2021-09-27 ENCOUNTER — CLINICAL SUPPORT (OUTPATIENT)
Dept: FAMILY MEDICINE | Facility: CLINIC | Age: 36
End: 2021-09-27
Payer: MEDICAID

## 2021-09-27 PROCEDURE — 96372 THER/PROPH/DIAG INJ SC/IM: CPT | Mod: PBBFAC,PN

## 2021-09-27 RX ADMIN — CYANOCOBALAMIN 1000 MCG: 1000 INJECTION INTRAMUSCULAR; SUBCUTANEOUS at 10:09

## 2021-09-30 ENCOUNTER — OFFICE VISIT (OUTPATIENT)
Dept: FAMILY MEDICINE | Facility: CLINIC | Age: 36
End: 2021-09-30
Payer: MEDICAID

## 2021-09-30 VITALS
WEIGHT: 169.81 LBS | SYSTOLIC BLOOD PRESSURE: 95 MMHG | DIASTOLIC BLOOD PRESSURE: 67 MMHG | HEART RATE: 95 BPM | RESPIRATION RATE: 18 BRPM | HEIGHT: 60 IN | BODY MASS INDEX: 33.34 KG/M2 | OXYGEN SATURATION: 97 %

## 2021-09-30 DIAGNOSIS — Z79.899 ENCOUNTER FOR LONG-TERM (CURRENT) USE OF MEDICATIONS: ICD-10-CM

## 2021-09-30 DIAGNOSIS — J06.9 UPPER RESPIRATORY TRACT INFECTION, UNSPECIFIED TYPE: Primary | ICD-10-CM

## 2021-09-30 PROBLEM — G63 VITAMIN B12 DEFICIENCY NEUROPATHY: Status: ACTIVE | Noted: 2021-09-30

## 2021-09-30 PROBLEM — E53.8 VITAMIN B12 DEFICIENCY NEUROPATHY: Status: ACTIVE | Noted: 2021-09-30

## 2021-09-30 LAB
CTP QC/QA: YES
SARS-COV-2 RDRP RESP QL NAA+PROBE: NEGATIVE

## 2021-09-30 PROCEDURE — 99214 PR OFFICE/OUTPT VISIT, EST, LEVL IV, 30-39 MIN: ICD-10-PCS | Mod: S$PBB,,, | Performed by: FAMILY MEDICINE

## 2021-09-30 PROCEDURE — 99214 OFFICE O/P EST MOD 30 MIN: CPT | Mod: S$PBB,,, | Performed by: FAMILY MEDICINE

## 2021-09-30 PROCEDURE — 99999 PR PBB SHADOW E&M-EST. PATIENT-LVL III: ICD-10-PCS | Mod: PBBFAC,,, | Performed by: FAMILY MEDICINE

## 2021-09-30 PROCEDURE — 99999 PR PBB SHADOW E&M-EST. PATIENT-LVL III: CPT | Mod: PBBFAC,,, | Performed by: FAMILY MEDICINE

## 2021-09-30 PROCEDURE — 99213 OFFICE O/P EST LOW 20 MIN: CPT | Mod: PBBFAC,PN | Performed by: FAMILY MEDICINE

## 2021-09-30 PROCEDURE — U0002 COVID-19 LAB TEST NON-CDC: HCPCS | Mod: PBBFAC,PN | Performed by: FAMILY MEDICINE

## 2021-09-30 PROCEDURE — 80307 DRUG TEST PRSMV CHEM ANLYZR: CPT | Performed by: FAMILY MEDICINE

## 2021-09-30 RX ORDER — AMOXICILLIN AND CLAVULANATE POTASSIUM 875; 125 MG/1; MG/1
1 TABLET, FILM COATED ORAL EVERY 12 HOURS
Qty: 20 TABLET | Refills: 0 | Status: SHIPPED | OUTPATIENT
Start: 2021-09-30 | End: 2021-10-10

## 2021-09-30 RX ORDER — PREDNISONE 20 MG/1
20 TABLET ORAL DAILY
Qty: 5 TABLET | Refills: 0 | Status: SHIPPED | OUTPATIENT
Start: 2021-09-30 | End: 2021-10-05

## 2021-10-04 DIAGNOSIS — R06.02 SHORTNESS OF BREATH: ICD-10-CM

## 2021-10-04 DIAGNOSIS — Z76.0 MEDICATION REFILL: ICD-10-CM

## 2021-10-04 DIAGNOSIS — F50.81 BINGE EATING DISORDER: ICD-10-CM

## 2021-10-04 DIAGNOSIS — L68.0 HIRSUTISM: ICD-10-CM

## 2021-10-05 LAB
6MAM UR QL: NOT DETECTED
7AMINOCLONAZEPAM UR QL: NOT DETECTED
A-OH ALPRAZ UR QL: NOT DETECTED
ALPHA-OH-MIDAZOLAM: NOT DETECTED
ALPRAZ UR QL: NOT DETECTED
AMPHET UR QL SCN: PRESENT
ANNOTATION COMMENT IMP: NORMAL
ANNOTATION COMMENT IMP: NORMAL
BARBITURATES UR QL: NOT DETECTED
BUPRENORPHINE UR QL: NOT DETECTED
BZE UR QL: NOT DETECTED
CARBOXYTHC UR QL: PRESENT
CARISOPRODOL UR QL: NOT DETECTED
CLONAZEPAM UR QL: NOT DETECTED
CODEINE UR QL: NOT DETECTED
CREAT UR-MCNC: 236.7 MG/DL (ref 20–400)
DIAZEPAM UR QL: NOT DETECTED
ETHYL GLUCURONIDE UR QL: NOT DETECTED
FENTANYL UR QL: NOT DETECTED
GABAPENTIN: NOT DETECTED
HYDROCODONE UR QL: NOT DETECTED
HYDROMORPHONE UR QL: NOT DETECTED
LORAZEPAM UR QL: NOT DETECTED
MDA UR QL: NOT DETECTED
MDEA UR QL: NOT DETECTED
MDMA UR QL: NOT DETECTED
ME-PHENIDATE UR QL: NOT DETECTED
METHADONE UR QL: NOT DETECTED
METHAMPHET UR QL: NOT DETECTED
MIDAZOLAM UR QL SCN: NOT DETECTED
MORPHINE UR QL: NOT DETECTED
NALOXONE: NOT DETECTED
NORBUPRENORPHINE UR QL CFM: NOT DETECTED
NORDIAZEPAM UR QL: NOT DETECTED
NORFENTANYL UR QL: NOT DETECTED
NORHYDROCODONE UR QL CFM: NOT DETECTED
NORMEPERIDINE UR QL CFM: NOT DETECTED
NOROXYCODONE UR QL CFM: NOT DETECTED
NOROXYMORPHONE UR QL SCN: NOT DETECTED
OXAZEPAM UR QL: NOT DETECTED
OXYCODONE UR QL: NOT DETECTED
OXYMORPHONE UR QL: NOT DETECTED
PATHOLOGY STUDY: NORMAL
PCP UR QL: NOT DETECTED
PHENTERMINE UR QL: NOT DETECTED
PREGABALIN: NOT DETECTED
SERVICE CMNT-IMP: NORMAL
TAPENTADOL UR QL SCN: NOT DETECTED
TAPENTADOL UR QL SCN: NOT DETECTED
TEMAZEPAM UR QL: NOT DETECTED
TRAMADOL UR QL: NOT DETECTED
ZOLPIDEM METABOLITE: NOT DETECTED
ZOLPIDEM UR QL: NOT DETECTED

## 2021-10-05 RX ORDER — LISDEXAMFETAMINE DIMESYLATE 60 MG/1
60 CAPSULE ORAL EVERY MORNING
Qty: 30 CAPSULE | Refills: 0 | Status: SHIPPED | OUTPATIENT
Start: 2021-10-05 | End: 2021-11-08 | Stop reason: SDUPTHER

## 2021-10-05 RX ORDER — OXYBUTYNIN CHLORIDE 15 MG/1
15 TABLET, EXTENDED RELEASE ORAL DAILY
Qty: 30 TABLET | Refills: 3 | Status: SHIPPED | OUTPATIENT
Start: 2021-10-05 | End: 2022-08-03

## 2021-10-05 RX ORDER — GABAPENTIN 300 MG/1
600 CAPSULE ORAL NIGHTLY
Qty: 180 CAPSULE | Refills: 1 | Status: SHIPPED | OUTPATIENT
Start: 2021-10-05 | End: 2021-11-04 | Stop reason: SDUPTHER

## 2021-10-05 RX ORDER — ALBUTEROL SULFATE 90 UG/1
2 AEROSOL, METERED RESPIRATORY (INHALATION) EVERY 6 HOURS PRN
Qty: 18 G | Refills: 5 | Status: SHIPPED | OUTPATIENT
Start: 2021-10-05 | End: 2022-01-10 | Stop reason: SDUPTHER

## 2021-10-05 RX ORDER — SPIRONOLACTONE 25 MG/1
25 TABLET ORAL DAILY
Qty: 30 TABLET | Refills: 3 | Status: SHIPPED | OUTPATIENT
Start: 2021-10-05 | End: 2022-08-03

## 2021-10-07 ENCOUNTER — TELEPHONE (OUTPATIENT)
Dept: FAMILY MEDICINE | Facility: CLINIC | Age: 36
End: 2021-10-07

## 2021-10-08 ENCOUNTER — TELEPHONE (OUTPATIENT)
Dept: FAMILY MEDICINE | Facility: CLINIC | Age: 36
End: 2021-10-08

## 2021-11-08 DIAGNOSIS — F50.81 BINGE EATING DISORDER: ICD-10-CM

## 2021-11-09 RX ORDER — LISDEXAMFETAMINE DIMESYLATE 60 MG/1
60 CAPSULE ORAL EVERY MORNING
Qty: 30 CAPSULE | Refills: 0 | Status: SHIPPED | OUTPATIENT
Start: 2021-11-09 | End: 2021-12-09 | Stop reason: SDUPTHER

## 2021-12-09 DIAGNOSIS — F50.81 BINGE EATING DISORDER: ICD-10-CM

## 2021-12-10 RX ORDER — LISDEXAMFETAMINE DIMESYLATE 60 MG/1
60 CAPSULE ORAL EVERY MORNING
Qty: 30 CAPSULE | Refills: 0 | Status: SHIPPED | OUTPATIENT
Start: 2021-12-10 | End: 2022-01-13 | Stop reason: SDUPTHER

## 2022-01-10 DIAGNOSIS — R06.02 SHORTNESS OF BREATH: ICD-10-CM

## 2022-01-10 DIAGNOSIS — F50.81 BINGE EATING DISORDER: ICD-10-CM

## 2022-01-10 NOTE — TELEPHONE ENCOUNTER
Requested by: Patient      Date Patient Last Seen: 9/30/2021  This request for medication does not require lab results.  Patient Next Follow up appointment scheduled: NONE    Last Prescription:     Date Prescribed on: 12/10/2021     # Dispensed: 30     Number of Refills:  0 refill(s).     Medication pended to Dr May Sabillon for review.

## 2022-01-11 RX ORDER — LISDEXAMFETAMINE DIMESYLATE 60 MG/1
60 CAPSULE ORAL EVERY MORNING
Qty: 30 CAPSULE | Refills: 0 | OUTPATIENT
Start: 2022-01-11

## 2022-01-11 RX ORDER — ALBUTEROL SULFATE 90 UG/1
2 AEROSOL, METERED RESPIRATORY (INHALATION) EVERY 6 HOURS PRN
Qty: 18 G | Refills: 5 | Status: SHIPPED | OUTPATIENT
Start: 2022-01-11 | End: 2022-06-13 | Stop reason: SDUPTHER

## 2022-01-11 NOTE — TELEPHONE ENCOUNTER
----- Message from Fritz Augustine sent at 1/11/2022  9:23 AM CST -----  Type: Needs Medical Advice  Who Called:  Patient    Pharmacy name and phone #:   [id number] - [place], [place] - [address]  [address]  [place] [address]  Phone: [phone] Fax: [phone]      Best Call Back Number: [phone]  Additional Information:  Patient states that her refill isn't at the pharmacy:  lisdexamfetamine (VYVANSE) 60 MG capsule    Please call to advise

## 2022-01-11 NOTE — TELEPHONE ENCOUNTER
Virtual visit scheduled Thursday afternoon for med refills.   Pt states she is out of this med and is requesting a refill at this time.  Please advise, thank you.

## 2022-01-13 ENCOUNTER — OFFICE VISIT (OUTPATIENT)
Dept: FAMILY MEDICINE | Facility: CLINIC | Age: 37
End: 2022-01-13
Payer: MEDICAID

## 2022-01-13 DIAGNOSIS — F50.81 BINGE EATING DISORDER: ICD-10-CM

## 2022-01-13 DIAGNOSIS — Z79.899 ENCOUNTER FOR LONG-TERM CURRENT USE OF MEDICATION: Primary | ICD-10-CM

## 2022-01-13 PROCEDURE — 1159F MED LIST DOCD IN RCRD: CPT | Mod: CPTII,GT,, | Performed by: FAMILY MEDICINE

## 2022-01-13 PROCEDURE — 99441 PR PHYSICIAN TELEPHONE EVALUATION 5-10 MIN: CPT | Mod: GT,,, | Performed by: FAMILY MEDICINE

## 2022-01-13 PROCEDURE — 1160F PR REVIEW ALL MEDS BY PRESCRIBER/CLIN PHARMACIST DOCUMENTED: ICD-10-PCS | Mod: CPTII,GT,, | Performed by: FAMILY MEDICINE

## 2022-01-13 PROCEDURE — 99441 PR PHYSICIAN TELEPHONE EVALUATION 5-10 MIN: ICD-10-PCS | Mod: GT,,, | Performed by: FAMILY MEDICINE

## 2022-01-13 PROCEDURE — 1159F PR MEDICATION LIST DOCUMENTED IN MEDICAL RECORD: ICD-10-PCS | Mod: CPTII,GT,, | Performed by: FAMILY MEDICINE

## 2022-01-13 PROCEDURE — 1160F RVW MEDS BY RX/DR IN RCRD: CPT | Mod: CPTII,GT,, | Performed by: FAMILY MEDICINE

## 2022-01-13 RX ORDER — TIZANIDINE 4 MG/1
4 TABLET ORAL 3 TIMES DAILY PRN
COMMUNITY
Start: 2022-01-06 | End: 2022-08-03

## 2022-01-13 RX ORDER — ACYCLOVIR 400 MG/1
400 TABLET ORAL 3 TIMES DAILY
COMMUNITY
Start: 2022-01-05 | End: 2022-08-03

## 2022-01-13 RX ORDER — LISDEXAMFETAMINE DIMESYLATE 60 MG/1
60 CAPSULE ORAL EVERY MORNING
Qty: 30 CAPSULE | Refills: 0 | Status: SHIPPED | OUTPATIENT
Start: 2022-01-13 | End: 2022-03-02 | Stop reason: SDUPTHER

## 2022-01-13 RX ORDER — OXYCODONE AND ACETAMINOPHEN 7.5; 325 MG/1; MG/1
1 TABLET ORAL EVERY 6 HOURS PRN
COMMUNITY
Start: 2022-01-11 | End: 2022-09-08

## 2022-01-13 NOTE — Clinical Note
Please contact patient to schedule non-fasting labs (anytime between now and 3 months from now) and a 3 month f/u. Thanks

## 2022-01-15 NOTE — PROGRESS NOTES
The patient location is: home  The chief complaint leading to consultation is: follow-up/med refill    Visit type: audiovisual    Face to Face time with patient: 10  10 minutes of total time spent on the encounter, which includes face to face time and non-face to face time preparing to see the patient (eg, review of tests), Obtaining and/or reviewing separately obtained history, Documenting clinical information in the electronic or other health record, Independently interpreting results (not separately reported) and communicating results to the patient/family/caregiver, or Care coordination (not separately reported).         Each patient to whom he or she provides medical services by telemedicine is:  (1) informed of the relationship between the physician and patient and the respective role of any other health care provider with respect to management of the patient; and (2) notified that he or she may decline to receive medical services by telemedicine and may withdraw from such care at any time.    Notes: Follow-up. Had surgery since last seeing me and is currently at home recovering. Otherwise no new complaints. Doing well with vyvanse for managing binge eating disorder.    Answers for HPI/ROS submitted by the patient on 1/13/2022  activity change: Yes  unexpected weight change: No  neck pain: Yes  hearing loss: No  rhinorrhea: No  trouble swallowing: No  eye discharge: No  visual disturbance: No  chest tightness: No  wheezing: No  chest pain: No  palpitations: No  blood in stool: No  constipation: Yes  vomiting: No  diarrhea: No  polydipsia: Yes  polyuria: No  difficulty urinating: No  hematuria: No  menstrual problem: No  dysuria: No  joint swelling: Yes  arthralgias: Yes  headaches: No  weakness: No  confusion: No  dysphoric mood: No    Physical Exam  Constitutional:       General: She is not in acute distress.     Appearance: She is not toxic-appearing.   HENT:      Head: Normocephalic and atraumatic.    Pulmonary:      Effort: No respiratory distress.   Neurological:      Mental Status: She is alert and oriented to person, place, and time.   Psychiatric:         Mood and Affect: Mood normal.         Behavior: Behavior normal.     Corina was seen today for follow-up.    Diagnoses and all orders for this visit:    Encounter for long-term current use of medication  -     lisdexamfetamine (VYVANSE) 60 MG capsule; Take 1 capsule (60 mg total) by mouth every morning.  -     Vitamin B12; Future  -     CBC Auto Differential; Future  -     Comprehensive Metabolic Panel; Future  -     TSH; Future  -     T4, Free; Future    Binge eating disorder  -     lisdexamfetamine (VYVANSE) 60 MG capsule; Take 1 capsule (60 mg total) by mouth every morning.  -     Vitamin B12; Future  -     CBC Auto Differential; Future  -     Comprehensive Metabolic Panel; Future  -     TSH; Future  -     T4, Free; Future

## 2022-01-19 ENCOUNTER — CLINICAL SUPPORT (OUTPATIENT)
Dept: SMOKING CESSATION | Facility: CLINIC | Age: 37
End: 2022-01-19

## 2022-01-19 DIAGNOSIS — F17.200 NICOTINE DEPENDENCE: Primary | ICD-10-CM

## 2022-01-19 PROCEDURE — 99999 PR PBB SHADOW E&M-EST. PATIENT-LVL I: ICD-10-PCS | Mod: PBBFAC,,,

## 2022-01-19 PROCEDURE — 99406 PR TOBACCO USE CESSATION INTERMEDIATE 3-10 MINUTES: ICD-10-PCS | Mod: S$GLB,,,

## 2022-01-19 PROCEDURE — 99999 PR PBB SHADOW E&M-EST. PATIENT-LVL I: CPT | Mod: PBBFAC,,,

## 2022-01-19 PROCEDURE — 99406 BEHAV CHNG SMOKING 3-10 MIN: CPT | Mod: S$GLB,,,

## 2022-01-19 NOTE — PROGRESS NOTES
Called pt to f/u on her 12 month smoking cessation quit status. Pt stated she is still smoking and not interested. Will complete smart form and resolve quit #1 episode.

## 2022-03-15 ENCOUNTER — TELEPHONE (OUTPATIENT)
Dept: FAMILY MEDICINE | Facility: CLINIC | Age: 37
End: 2022-03-15
Payer: MEDICAID

## 2022-03-15 NOTE — TELEPHONE ENCOUNTER
----- Message from Lise Whitehead sent at 3/15/2022 11:44 AM CDT -----  Regarding: appointment  Contact: self  Type:  Same Day Appointment Request    Caller is requesting a same day appointment.  Caller declined first available appointment listed below.      Name of Caller:  self   When is the first available appointment?  04/19/2022  Symptoms:  shoulder pain, wrist pain  Best Call Back Number:  [phone]  Additional Information:

## 2022-03-15 NOTE — TELEPHONE ENCOUNTER
Spoke with patient. Patient had shoulder surgery 2 months ago. Advised patient she should contact surgeons office that completed the procedure. Verbalized understanding.

## 2022-04-21 ENCOUNTER — CLINICAL SUPPORT (OUTPATIENT)
Dept: FAMILY MEDICINE | Facility: CLINIC | Age: 37
End: 2022-04-21
Payer: MEDICAID

## 2022-04-21 DIAGNOSIS — Z79.899 ENCOUNTER FOR LONG-TERM CURRENT USE OF MEDICATION: Primary | ICD-10-CM

## 2022-04-21 PROCEDURE — 80307 DRUG TEST PRSMV CHEM ANLYZR: CPT | Performed by: FAMILY MEDICINE

## 2022-04-22 NOTE — PROGRESS NOTES
Presents to clinic to present to provide urine specimen for UDS.   Specimen collected via clean catch method.

## 2022-04-26 LAB
6MAM UR QL: NOT DETECTED
7AMINOCLONAZEPAM UR QL: NOT DETECTED
A-OH ALPRAZ UR QL: NOT DETECTED
ALPHA-OH-MIDAZOLAM: NOT DETECTED
ALPRAZ UR QL: NOT DETECTED
AMPHET UR QL SCN: PRESENT
ANNOTATION COMMENT IMP: NORMAL
ANNOTATION COMMENT IMP: NORMAL
BARBITURATES UR QL: NOT DETECTED
BUPRENORPHINE UR QL: NOT DETECTED
BZE UR QL: NOT DETECTED
CARBOXYTHC UR QL: PRESENT
CARISOPRODOL UR QL: NOT DETECTED
CLONAZEPAM UR QL: NOT DETECTED
CODEINE UR QL: NOT DETECTED
CREAT UR-MCNC: 36.8 MG/DL (ref 20–400)
DIAZEPAM UR QL: NOT DETECTED
ETHYL GLUCURONIDE UR QL: NOT DETECTED
FENTANYL UR QL: NOT DETECTED
GABAPENTIN: NOT DETECTED
HYDROCODONE UR QL: NOT DETECTED
HYDROMORPHONE UR QL: NOT DETECTED
LORAZEPAM UR QL: NOT DETECTED
MDA UR QL: NOT DETECTED
MDEA UR QL: NOT DETECTED
MDMA UR QL: NOT DETECTED
ME-PHENIDATE UR QL: NOT DETECTED
METHADONE UR QL: NOT DETECTED
METHAMPHET UR QL: NOT DETECTED
MIDAZOLAM UR QL SCN: NOT DETECTED
MORPHINE UR QL: NOT DETECTED
NALOXONE: NOT DETECTED
NORBUPRENORPHINE UR QL CFM: NOT DETECTED
NORDIAZEPAM UR QL: NOT DETECTED
NORFENTANYL UR QL: NOT DETECTED
NORHYDROCODONE UR QL CFM: NOT DETECTED
NORMEPERIDINE UR QL CFM: NOT DETECTED
NOROXYCODONE UR QL CFM: NOT DETECTED
NOROXYMORPHONE UR QL SCN: NOT DETECTED
OXAZEPAM UR QL: NOT DETECTED
OXYCODONE UR QL: NOT DETECTED
OXYMORPHONE UR QL: PRESENT
PATHOLOGY STUDY: NORMAL
PCP UR QL: NOT DETECTED
PHENTERMINE UR QL: NOT DETECTED
PREGABALIN: NOT DETECTED
SERVICE CMNT-IMP: NORMAL
TAPENTADOL UR QL SCN: NOT DETECTED
TAPENTADOL UR QL SCN: NOT DETECTED
TEMAZEPAM UR QL: NOT DETECTED
TRAMADOL UR QL: NOT DETECTED
ZOLPIDEM METABOLITE: NOT DETECTED
ZOLPIDEM UR QL: NOT DETECTED

## 2022-04-27 ENCOUNTER — OFFICE VISIT (OUTPATIENT)
Dept: FAMILY MEDICINE | Facility: CLINIC | Age: 37
End: 2022-04-27
Payer: MEDICAID

## 2022-04-27 DIAGNOSIS — F50.81 BINGE EATING DISORDER: ICD-10-CM

## 2022-04-27 DIAGNOSIS — M54.50 DORSALGIA OF LUMBAR REGION: ICD-10-CM

## 2022-04-27 DIAGNOSIS — E53.8 VITAMIN B12 DEFICIENCY NEUROPATHY: Primary | ICD-10-CM

## 2022-04-27 DIAGNOSIS — Z79.899 ENCOUNTER FOR LONG-TERM CURRENT USE OF MEDICATION: ICD-10-CM

## 2022-04-27 DIAGNOSIS — G63 VITAMIN B12 DEFICIENCY NEUROPATHY: Primary | ICD-10-CM

## 2022-04-27 DIAGNOSIS — M67.431 GANGLION CYST OF DORSUM OF RIGHT WRIST: ICD-10-CM

## 2022-04-27 PROCEDURE — 99443 PR PHYSICIAN TELEPHONE EVALUATION 21-30 MIN: CPT | Mod: GT,,, | Performed by: FAMILY MEDICINE

## 2022-04-27 PROCEDURE — 99443 PR PHYSICIAN TELEPHONE EVALUATION 21-30 MIN: ICD-10-PCS | Mod: GT,,, | Performed by: FAMILY MEDICINE

## 2022-04-27 PROCEDURE — 1159F PR MEDICATION LIST DOCUMENTED IN MEDICAL RECORD: ICD-10-PCS | Mod: CPTII,GT,, | Performed by: FAMILY MEDICINE

## 2022-04-27 PROCEDURE — 1160F PR REVIEW ALL MEDS BY PRESCRIBER/CLIN PHARMACIST DOCUMENTED: ICD-10-PCS | Mod: CPTII,GT,, | Performed by: FAMILY MEDICINE

## 2022-04-27 PROCEDURE — 1159F MED LIST DOCD IN RCRD: CPT | Mod: CPTII,GT,, | Performed by: FAMILY MEDICINE

## 2022-04-27 PROCEDURE — 1160F RVW MEDS BY RX/DR IN RCRD: CPT | Mod: CPTII,GT,, | Performed by: FAMILY MEDICINE

## 2022-04-27 RX ORDER — AMOXICILLIN 500 MG/1
500 CAPSULE ORAL 3 TIMES DAILY
COMMUNITY
Start: 2022-04-22 | End: 2022-08-03

## 2022-04-27 RX ORDER — LISDEXAMFETAMINE DIMESYLATE 60 MG/1
60 CAPSULE ORAL EVERY MORNING
Qty: 30 CAPSULE | Refills: 0 | Status: SHIPPED | OUTPATIENT
Start: 2022-04-27 | End: 2022-06-16 | Stop reason: SDUPTHER

## 2022-04-27 NOTE — PROGRESS NOTES
The patient location is: her car  The chief complaint leading to consultation is: follow-up    Visit type: audiovisual    Face to Face time with patient: 22 min  23 minutes of total time spent on the encounter, which includes face to face time and non-face to face time preparing to see the patient (eg, review of tests), Obtaining and/or reviewing separately obtained history, Documenting clinical information in the electronic or other health record, Independently interpreting results (not separately reported) and communicating results to the patient/family/caregiver, or Care coordination (not separately reported).         Each patient to whom he or she provides medical services by telemedicine is:  (1) informed of the relationship between the physician and patient and the respective role of any other health care provider with respect to management of the patient; and (2) notified that he or she may decline to receive medical services by telemedicine and may withdraw from such care at any time.    Notes: Follow-up. Has had several oral surgeries since last seeing me. Also has had a cyst pop up on her right wrist. No associated pain, but it's getting bigger. Having low back pain as well. Due for refill of vyvanse.    Answers for HPI/ROS submitted by the patient on 4/27/2022  activity change: No  unexpected weight change: No  neck pain: No  hearing loss: No  rhinorrhea: No  trouble swallowing: No  eye discharge: No  visual disturbance: No  chest tightness: No  wheezing: No  chest pain: No  palpitations: No  blood in stool: No  constipation: No  vomiting: No  diarrhea: No  polydipsia: No  polyuria: No  difficulty urinating: No  hematuria: No  menstrual problem: No  dysuria: No  joint swelling: No  arthralgias: No  headaches: No  weakness: No  confusion: No  dysphoric mood: No    Physical Exam  Constitutional:       General: She is not in acute distress.  Pulmonary:      Effort: No respiratory distress.   Neurological:       Mental Status: She is alert and oriented to person, place, and time.   Psychiatric:         Mood and Affect: Mood normal.         Behavior: Behavior normal.     Corina was seen today for follow-up.    Diagnoses and all orders for this visit:    Vitamin B12 deficiency neuropathy    Binge eating disorder  -     lisdexamfetamine (VYVANSE) 60 MG capsule; Take 1 capsule (60 mg total) by mouth every morning.    Encounter for long-term current use of medication  -     lisdexamfetamine (VYVANSE) 60 MG capsule; Take 1 capsule (60 mg total) by mouth every morning.    Ganglion cyst of dorsum of right wrist  -     Ambulatory referral/consult to Orthopedics; Future    Dorsalgia of lumbar region  -     Ambulatory referral/consult to Physical/Occupational Therapy; Future

## 2022-04-27 NOTE — Clinical Note
Please contact patient to schedule labs at her convenience and a 3 month follow-up with me. Also needs PT referral faxed to [facility] in [place].

## 2022-04-29 ENCOUNTER — DOCUMENTATION ONLY (OUTPATIENT)
Dept: FAMILY MEDICINE | Facility: CLINIC | Age: 37
End: 2022-04-29
Payer: MEDICAID

## 2022-04-29 NOTE — PROGRESS NOTES
"Referral sent to [facility] in [place]      Your fax has been successfully sent to [phone] at [phone].  ------------------------------------------------------------  From: [id number]  ------------------------------------------------------------  4/29/2022 3:32:28 PM Transmission Record          Sent to [phone] with remote ID "[id number]"          Result: (0/339;0/0) Success          Page record: 1 - 16          Elapsed time: 05:32 on channel 9  "

## 2022-07-19 ENCOUNTER — PATIENT MESSAGE (OUTPATIENT)
Dept: FAMILY MEDICINE | Facility: CLINIC | Age: 37
End: 2022-07-19
Payer: MEDICAID

## 2022-08-03 ENCOUNTER — OFFICE VISIT (OUTPATIENT)
Dept: FAMILY MEDICINE | Facility: CLINIC | Age: 37
End: 2022-08-03
Payer: MEDICAID

## 2022-08-03 VITALS
DIASTOLIC BLOOD PRESSURE: 75 MMHG | HEIGHT: 60 IN | WEIGHT: 176.88 LBS | OXYGEN SATURATION: 98 % | HEART RATE: 76 BPM | SYSTOLIC BLOOD PRESSURE: 122 MMHG | BODY MASS INDEX: 34.73 KG/M2

## 2022-08-03 DIAGNOSIS — M54.16 LUMBAR RADICULOPATHY: ICD-10-CM

## 2022-08-03 DIAGNOSIS — Z79.899 HIGH RISK MEDICATION USE: Primary | ICD-10-CM

## 2022-08-03 DIAGNOSIS — R20.2 NUMBNESS AND TINGLING OF FOOT: ICD-10-CM

## 2022-08-03 DIAGNOSIS — M62.562 ATROPHY OF CALF MUSCLES ON LEFT: ICD-10-CM

## 2022-08-03 DIAGNOSIS — R23.2 HOT FLASHES: ICD-10-CM

## 2022-08-03 DIAGNOSIS — R20.0 NUMBNESS AND TINGLING OF FOOT: ICD-10-CM

## 2022-08-03 PROBLEM — M50.90 CERVICAL DISC DISORDER: Status: ACTIVE | Noted: 2022-08-03

## 2022-08-03 PROBLEM — Z85.850 HISTORY OF THYROID CANCER: Status: ACTIVE | Noted: 2022-08-03

## 2022-08-03 PROBLEM — M50.20 DISPLACEMENT OF CERVICAL INTERVERTEBRAL DISC: Status: ACTIVE | Noted: 2022-08-03

## 2022-08-03 PROBLEM — M54.17 LUMBOSACRAL RADICULOPATHY: Status: ACTIVE | Noted: 2022-08-03

## 2022-08-03 PROBLEM — M47.894 THORACIC FACET SYNDROME: Status: ACTIVE | Noted: 2022-08-03

## 2022-08-03 PROBLEM — E89.0 HISTORY OF THYROIDECTOMY: Status: ACTIVE | Noted: 2022-08-03

## 2022-08-03 PROBLEM — M75.110 PARTIAL THICKNESS ROTATOR CUFF TEAR: Status: ACTIVE | Noted: 2022-08-03

## 2022-08-03 PROBLEM — J44.1 COPD EXACERBATION: Status: RESOLVED | Noted: 2017-07-03 | Resolved: 2022-08-03

## 2022-08-03 PROBLEM — K21.9 GASTROESOPHAGEAL REFLUX DISEASE: Status: ACTIVE | Noted: 2022-08-03

## 2022-08-03 PROBLEM — M53.3 DISORDER OF SACRUM: Status: ACTIVE | Noted: 2022-08-03

## 2022-08-03 PROCEDURE — 80355 GABAPENTIN NON-BLOOD: CPT | Performed by: FAMILY MEDICINE

## 2022-08-03 PROCEDURE — 3008F BODY MASS INDEX DOCD: CPT | Mod: CPTII,,, | Performed by: FAMILY MEDICINE

## 2022-08-03 PROCEDURE — 1160F PR REVIEW ALL MEDS BY PRESCRIBER/CLIN PHARMACIST DOCUMENTED: ICD-10-PCS | Mod: CPTII,,, | Performed by: FAMILY MEDICINE

## 2022-08-03 PROCEDURE — 1160F RVW MEDS BY RX/DR IN RCRD: CPT | Mod: CPTII,,, | Performed by: FAMILY MEDICINE

## 2022-08-03 PROCEDURE — 99214 PR OFFICE/OUTPT VISIT, EST, LEVL IV, 30-39 MIN: ICD-10-PCS | Mod: S$PBB,,, | Performed by: FAMILY MEDICINE

## 2022-08-03 PROCEDURE — 1159F MED LIST DOCD IN RCRD: CPT | Mod: CPTII,,, | Performed by: FAMILY MEDICINE

## 2022-08-03 PROCEDURE — 1159F PR MEDICATION LIST DOCUMENTED IN MEDICAL RECORD: ICD-10-PCS | Mod: CPTII,,, | Performed by: FAMILY MEDICINE

## 2022-08-03 PROCEDURE — 99999 PR PBB SHADOW E&M-EST. PATIENT-LVL III: ICD-10-PCS | Mod: PBBFAC,,, | Performed by: FAMILY MEDICINE

## 2022-08-03 PROCEDURE — 80326 AMPHETAMINES 5 OR MORE: CPT | Performed by: FAMILY MEDICINE

## 2022-08-03 PROCEDURE — 3078F PR MOST RECENT DIASTOLIC BLOOD PRESSURE < 80 MM HG: ICD-10-PCS | Mod: CPTII,,, | Performed by: FAMILY MEDICINE

## 2022-08-03 PROCEDURE — 3078F DIAST BP <80 MM HG: CPT | Mod: CPTII,,, | Performed by: FAMILY MEDICINE

## 2022-08-03 PROCEDURE — 3074F SYST BP LT 130 MM HG: CPT | Mod: CPTII,,, | Performed by: FAMILY MEDICINE

## 2022-08-03 PROCEDURE — 3008F PR BODY MASS INDEX (BMI) DOCUMENTED: ICD-10-PCS | Mod: CPTII,,, | Performed by: FAMILY MEDICINE

## 2022-08-03 PROCEDURE — 99214 OFFICE O/P EST MOD 30 MIN: CPT | Mod: S$PBB,,, | Performed by: FAMILY MEDICINE

## 2022-08-03 PROCEDURE — 99999 PR PBB SHADOW E&M-EST. PATIENT-LVL III: CPT | Mod: PBBFAC,,, | Performed by: FAMILY MEDICINE

## 2022-08-03 PROCEDURE — 99213 OFFICE O/P EST LOW 20 MIN: CPT | Mod: PBBFAC,PN | Performed by: FAMILY MEDICINE

## 2022-08-03 PROCEDURE — 3074F PR MOST RECENT SYSTOLIC BLOOD PRESSURE < 130 MM HG: ICD-10-PCS | Mod: CPTII,,, | Performed by: FAMILY MEDICINE

## 2022-08-03 NOTE — PROGRESS NOTES
Subjective:       Patient ID: Corina Liu is a 37 y.o. female.    Chief Complaint: Follow-up (3 mth f/u), Hot Flashes (Pt would like to be checked to see if she has started menopause. Pt states she is having sever hot flashes at night. ), and Tingling (Left leg. Pt states she also notices muscle mass loss. )    HPI   3 month follow-up. Has been having hot flashes, previously infrequent, more frequent over the past 2 months. Also has noticed more vaginal dryness. Is s/p partial hysterectomy in .    Left big toe numbness x 2 years, now has noticed tingling in the second toe and in the proximal metatarsal area of both feet. Also having atrophy of her left calf muscles compared to her right leg. There is associated low back pain.    Review of Systems   Constitutional: Negative for chills and fever.   Musculoskeletal: Positive for back pain.   Neurological: Positive for numbness. Negative for seizures, syncope and weakness.       Past Medical History:   Diagnosis Date    Cancer     Thyroid    Gastro-esophageal reflux     Hepatitis C 2016    antibody positive only    History of PCOS     Hypoglycemia     Hypothyroidism (acquired)     IV drug user 3/10/2019    Myocardial infarction     PTSD (post-traumatic stress disorder)     Pyelonephritis 3/10/2019    Sepsis 3/10/2019    Tachycardia     Thyroid disease     hypothyroid, pappilary carcinoma     Past Surgical History:   Procedure Laterality Date     SECTION      x3    HYSTERECTOMY       partical     lymthnodes      from the right arm     SHOULDER SURGERY Right 2022    thyroid nodule biopsy      THYROIDECTOMY      10/17/13    TONSILLECTOMY, ADENOIDECTOMY      TUBAL LIGATION       Social History     Socioeconomic History    Marital status:    Tobacco Use    Smoking status: Current Every Day Smoker     Packs/day: 2.00     Years: 21.00     Pack years: 42.00     Types: Cigarettes    Smokeless tobacco: Never Used    Substance and Sexual Activity    Alcohol use: Yes     Alcohol/week: 0.0 standard drinks     Comment: socialy     Drug use: No     Comment: Prescription Methadone    Sexual activity: Yes     Partners: Male     Birth control/protection: None     Family History   Problem Relation Age of Onset    Breast cancer Maternal Grandmother     Ovarian cancer Maternal Grandmother     Cataracts Maternal Grandmother     Cancer Mother         VULVAR AND CERVICAL    Ovarian cancer Mother     Colon cancer Neg Hx        Objective:      /75 (BP Location: Left arm, Patient Position: Sitting, BP Method: Large (Automatic))   Pulse 76   Ht 5' (1.524 m)   Wt 80.2 kg (176 lb 14.4 oz)   LMP 08/27/2013   SpO2 98%   BMI 34.55 kg/m²   Physical Exam  Vitals reviewed.   Constitutional:       General: She is not in acute distress.     Appearance: She is obese. She is not toxic-appearing.   HENT:      Head: Normocephalic and atraumatic.   Cardiovascular:      Rate and Rhythm: Normal rate.   Pulmonary:      Effort: Pulmonary effort is normal. No respiratory distress.   Musculoskeletal:         General: Deformity (visible atrophy of left calf muscles) present.   Skin:     Coloration: Skin is not jaundiced.   Neurological:      Mental Status: She is alert and oriented to person, place, and time.      Sensory: Sensory deficit present.      Motor: No weakness.      Coordination: Coordination normal.      Deep Tendon Reflexes: Reflexes normal.      Comments: decr sensation left lateral thigh and calf   Psychiatric:         Mood and Affect: Mood normal.         Behavior: Behavior normal.         Assessment:       1. High risk medication use    2. Hot flashes    3. Numbness and tingling of foot    4. Lumbar radiculopathy    5. Atrophy of calf muscles on left        Plan:       Update bloodwork, MRI ordered for further evaluate of worsening back pain and left calf atrophy.    High risk medication use  -     [facility] Drug Screen  -      TSH; Future; Expected date: 08/03/2022  -     T4, Free; Future; Expected date: 08/03/2022  -     Comprehensive Metabolic Panel; Future; Expected date: 08/03/2022  -     CBC Auto Differential; Future; Expected date: 08/03/2022  -     Vitamin B12; Future; Expected date: 08/03/2022  -     Vitamin B6; Future; Expected date: 08/03/2022  -     Folate; Future; Expected date: 08/03/2022    Hot flashes  -     Luteinizing hormone; Future; Expected date: 08/03/2022  -     Follicle stimulating hormone; Future; Expected date: 08/03/2022  -     Estradiol; Future; Expected date: 08/03/2022  -     Testosterone; Future; Expected date: 08/03/2022  -     Progesterone; Future; Expected date: 08/03/2022  -     TSH; Future; Expected date: 08/03/2022  -     T4, Free; Future; Expected date: 08/03/2022  -     Comprehensive Metabolic Panel; Future; Expected date: 08/03/2022  -     CBC Auto Differential; Future; Expected date: 08/03/2022  -     Vitamin B12; Future; Expected date: 08/03/2022    Numbness and tingling of foot  -     Vitamin B6; Future; Expected date: 08/03/2022  -     Folate; Future; Expected date: 08/03/2022  -     MRI Lumbar Spine Without Contrast; Future; Expected date: 08/03/2022    Lumbar radiculopathy  -     MRI Lumbar Spine Without Contrast; Future; Expected date: 08/03/2022    Atrophy of calf muscles on left  -     MRI Lumbar Spine Without Contrast; Future; Expected date: 08/03/2022            Risks, benefits, and side effects were discussed with the patient. All questions were answered to the fullest satisfaction of the patient, and pt verbalized understanding and agreement to treatment plan. Pt was to call with any new or worsening symptoms, or present to the ER.

## 2022-08-08 LAB
6MAM UR QL: NOT DETECTED
7AMINOCLONAZEPAM UR QL: NOT DETECTED
A-OH ALPRAZ UR QL: NOT DETECTED
ALPHA-OH-MIDAZOLAM: NOT DETECTED
ALPRAZ UR QL: NOT DETECTED
AMPHET UR QL SCN: PRESENT
ANNOTATION COMMENT IMP: NORMAL
ANNOTATION COMMENT IMP: NORMAL
BARBITURATES UR QL: NOT DETECTED
BUPRENORPHINE UR QL: NOT DETECTED
BZE UR QL: NOT DETECTED
CARBOXYTHC UR QL: PRESENT
CARISOPRODOL UR QL: NOT DETECTED
CLONAZEPAM UR QL: NOT DETECTED
CODEINE UR QL: NOT DETECTED
CREAT UR-MCNC: 68.6 MG/DL (ref 20–400)
DIAZEPAM UR QL: NOT DETECTED
ETHYL GLUCURONIDE UR QL: NOT DETECTED
FENTANYL UR QL: NOT DETECTED
GABAPENTIN: NOT DETECTED
HYDROCODONE UR QL: NOT DETECTED
HYDROMORPHONE UR QL: NOT DETECTED
LORAZEPAM UR QL: NOT DETECTED
MDA UR QL: NOT DETECTED
MDEA UR QL: NOT DETECTED
MDMA UR QL: NOT DETECTED
ME-PHENIDATE UR QL: NOT DETECTED
METHADONE UR QL: NOT DETECTED
METHAMPHET UR QL: NOT DETECTED
MIDAZOLAM UR QL SCN: NOT DETECTED
MORPHINE UR QL: NOT DETECTED
NALOXONE: NOT DETECTED
NORBUPRENORPHINE UR QL CFM: NOT DETECTED
NORDIAZEPAM UR QL: NOT DETECTED
NORFENTANYL UR QL: NOT DETECTED
NORHYDROCODONE UR QL CFM: NOT DETECTED
NORMEPERIDINE UR QL CFM: NOT DETECTED
NOROXYCODONE UR QL CFM: NOT DETECTED
NOROXYMORPHONE UR QL SCN: NOT DETECTED
OXAZEPAM UR QL: NOT DETECTED
OXYCODONE UR QL: NOT DETECTED
OXYMORPHONE UR QL: NOT DETECTED
PATHOLOGY STUDY: NORMAL
PCP UR QL: NOT DETECTED
PHENTERMINE UR QL: NOT DETECTED
PREGABALIN: NOT DETECTED
SERVICE CMNT-IMP: NORMAL
TAPENTADOL UR QL SCN: NOT DETECTED
TAPENTADOL UR QL SCN: NOT DETECTED
TEMAZEPAM UR QL: NOT DETECTED
TRAMADOL UR QL: NOT DETECTED
ZOLPIDEM METABOLITE: NOT DETECTED
ZOLPIDEM UR QL: NOT DETECTED

## 2022-08-16 ENCOUNTER — HOSPITAL ENCOUNTER (OUTPATIENT)
Dept: RADIOLOGY | Facility: HOSPITAL | Age: 37
Discharge: HOME | End: 2022-08-16
Attending: FAMILY MEDICINE
Payer: MEDICAID

## 2022-08-16 DIAGNOSIS — M54.16 LUMBAR RADICULOPATHY: ICD-10-CM

## 2022-08-16 DIAGNOSIS — R20.0 NUMBNESS AND TINGLING OF FOOT: ICD-10-CM

## 2022-08-16 DIAGNOSIS — R20.2 NUMBNESS AND TINGLING OF FOOT: ICD-10-CM

## 2022-08-16 DIAGNOSIS — M62.562 ATROPHY OF CALF MUSCLES ON LEFT: ICD-10-CM

## 2022-08-16 PROCEDURE — 72148 MRI LUMBAR SPINE W/O DYE: CPT | Mod: TC

## 2022-08-16 PROCEDURE — 72148 MRI LUMBAR SPINE WITHOUT CONTRAST: ICD-10-PCS | Mod: 26,,, | Performed by: RADIOLOGY

## 2022-08-16 PROCEDURE — 72148 MRI LUMBAR SPINE W/O DYE: CPT | Mod: 26,,, | Performed by: RADIOLOGY

## 2022-08-23 ENCOUNTER — OFFICE VISIT (OUTPATIENT)
Dept: FAMILY MEDICINE | Facility: CLINIC | Age: 37
End: 2022-08-23
Payer: MEDICAID

## 2022-08-23 VITALS
DIASTOLIC BLOOD PRESSURE: 80 MMHG | RESPIRATION RATE: 18 BRPM | HEIGHT: 60 IN | WEIGHT: 172.13 LBS | SYSTOLIC BLOOD PRESSURE: 130 MMHG | OXYGEN SATURATION: 98 % | BODY MASS INDEX: 33.8 KG/M2 | HEART RATE: 80 BPM

## 2022-08-23 DIAGNOSIS — E07.9 THYROID DISEASE: Primary | ICD-10-CM

## 2022-08-23 DIAGNOSIS — M67.40 GANGLION CYST: ICD-10-CM

## 2022-08-23 DIAGNOSIS — R23.2 HOT FLASHES: ICD-10-CM

## 2022-08-23 PROCEDURE — 99999 PR PBB SHADOW E&M-EST. PATIENT-LVL IV: CPT | Mod: PBBFAC,,,

## 2022-08-23 PROCEDURE — 1159F MED LIST DOCD IN RCRD: CPT | Mod: CPTII,,,

## 2022-08-23 PROCEDURE — 3079F PR MOST RECENT DIASTOLIC BLOOD PRESSURE 80-89 MM HG: ICD-10-PCS | Mod: CPTII,,,

## 2022-08-23 PROCEDURE — 99214 PR OFFICE/OUTPT VISIT, EST, LEVL IV, 30-39 MIN: ICD-10-PCS | Mod: S$PBB,,,

## 2022-08-23 PROCEDURE — 3008F BODY MASS INDEX DOCD: CPT | Mod: CPTII,,,

## 2022-08-23 PROCEDURE — 1159F PR MEDICATION LIST DOCUMENTED IN MEDICAL RECORD: ICD-10-PCS | Mod: CPTII,,,

## 2022-08-23 PROCEDURE — 99214 OFFICE O/P EST MOD 30 MIN: CPT | Mod: PBBFAC,PN

## 2022-08-23 PROCEDURE — 3079F DIAST BP 80-89 MM HG: CPT | Mod: CPTII,,,

## 2022-08-23 PROCEDURE — 3075F SYST BP GE 130 - 139MM HG: CPT | Mod: CPTII,,,

## 2022-08-23 PROCEDURE — 99214 OFFICE O/P EST MOD 30 MIN: CPT | Mod: S$PBB,,,

## 2022-08-23 PROCEDURE — 99999 PR PBB SHADOW E&M-EST. PATIENT-LVL IV: ICD-10-PCS | Mod: PBBFAC,,,

## 2022-08-23 PROCEDURE — 3075F PR MOST RECENT SYSTOLIC BLOOD PRESS GE 130-139MM HG: ICD-10-PCS | Mod: CPTII,,,

## 2022-08-23 PROCEDURE — 3008F PR BODY MASS INDEX (BMI) DOCUMENTED: ICD-10-PCS | Mod: CPTII,,,

## 2022-08-23 RX ORDER — LEVOTHYROXINE SODIUM 112 UG/1
112 TABLET ORAL
Qty: 30 TABLET | Refills: 11 | Status: SHIPPED | OUTPATIENT
Start: 2022-08-23 | End: 2022-12-06 | Stop reason: SDUPTHER

## 2022-08-23 NOTE — Clinical Note
I added an order for B6 and let her know that I cannot place a referral to [facility] for her back due to her insurance. I can refer her to [facility] if she would like to stay in [place] for a sooner appointment.

## 2022-08-23 NOTE — PROGRESS NOTES
[facility] - Clinic Visit Note    Subjective:           Chief Complaint: Follow-up (Here for MRI and lab results )      HPI  Corina Liu is a 37 y.o. female presenting to clinic today to discuss labs and MRI results. Labs discussed as essentially normal with the exception of low TSH level, both agreed to lower synthroid level and will re-check in 3 months. Discussed she will need B12 every 3 weeks due to low levels  She has questions about a test she did last year with contrast enema at [facility]. She stated she never received results.  She also had complaints of cyst on left wrist and ankle that swells and causing discomfort.    MRI showed Impression:     1. Degenerative disc disease at L4-L5 resulting in mild central spinal canal stenosis with mild neural foraminal narrowing.  2. Small annular fissure of the L4-5 disc.  3. Degenerative disc disease at L5-S1 resulting in moderate neural foraminal narrowing.  Discussed the need for ortho/spine referral. Will check on what insurance will allow.      Review of Systems   Constitutional: Negative for chills and fever.   HENT: Negative for congestion.    Respiratory: Negative for cough and shortness of breath.    Cardiovascular: Negative for chest pain and leg swelling.   Gastrointestinal: Negative for abdominal pain, constipation, diarrhea, nausea and vomiting.   Genitourinary: Negative for difficulty urinating.   Musculoskeletal: Positive for back pain.   Neurological: Negative for dizziness and headaches.   All other systems reviewed and are negative.          Objective:      /80 (BP Location: Left arm, Patient Position: Sitting, BP Method: Medium (Automatic))   Pulse 80   Resp 18   Ht 5' (1.524 m)   Wt 78.1 kg (172 lb 1.6 oz)   LMP 08/27/2013   SpO2 98%   BMI 33.61 kg/m²   Physical Exam  Vitals and nursing note reviewed.   Constitutional:       Appearance: Normal appearance.   HENT:      Head: Normocephalic and atraumatic.      Nose: Nose  normal.   Eyes:      Pupils: Pupils are equal, round, and reactive to light.   Cardiovascular:      Rate and Rhythm: Normal rate and regular rhythm.      Heart sounds: Normal heart sounds.   Pulmonary:      Effort: Pulmonary effort is normal.      Breath sounds: Normal breath sounds.   Abdominal:      General: Abdomen is flat.   Musculoskeletal:         General: Normal range of motion.      Cervical back: Normal range of motion.   Skin:     General: Skin is warm and dry.   Neurological:      Mental Status: She is alert and oriented to person, place, and time.   Psychiatric:         Mood and Affect: Mood normal.         Behavior: Behavior normal.         Thought Content: Thought content normal.         Judgment: Judgment normal.         Assessment and Plan:       1. Thyroid disease  Overview:  hypothyroid, pappilary carcinoma    Orders:  -     levothyroxine (SYNTHROID) 112 MCG tablet  -     TSH  -     T4, Free    2. Ganglion cyst  -     Ambulatory referral/consult to Orthopedics    3. Hot flashes  -     levothyroxine (SYNTHROID) 112 MCG tablet  -     Vitamin B6      PLAN: Applies to all diagnosis and orders listed above.  - Will check with insurance before referring to ortho/spine  - Referral for ortho placed for cyst  - Synthroid decreased, labs ordered for 12 weeks  - Follow up in about 3 months (around 11/23/2022) for Vyvanse refill and eval TSH level.     Discussed with patient the plan of care. Medications reviewed. Medication side effects discussed. Patient has no questions or concerns at this time. Reviewed, monitored, evaluated, and assessed chronic conditions as outlined above. Reviewed family, medical, surgical, and social history. Reviewed and discussed labs and imaging from the last 3 months.  Informed patient to please notify me with any questions or concerns at anytime.    Thank you,  ANUSHKA Irwin  Family Medicine  [facility]

## 2022-08-30 ENCOUNTER — PATIENT MESSAGE (OUTPATIENT)
Dept: FAMILY MEDICINE | Facility: CLINIC | Age: 37
End: 2022-08-30
Payer: MEDICAID

## 2022-09-08 ENCOUNTER — CLINICAL SUPPORT (OUTPATIENT)
Dept: FAMILY MEDICINE | Facility: CLINIC | Age: 37
End: 2022-09-08
Payer: MEDICAID

## 2022-09-08 DIAGNOSIS — E53.8 VITAMIN B12 DEFICIENCY NEUROPATHY: Primary | ICD-10-CM

## 2022-09-08 DIAGNOSIS — G63 VITAMIN B12 DEFICIENCY NEUROPATHY: Primary | ICD-10-CM

## 2022-09-08 PROCEDURE — 99999 PR PBB SHADOW E&M-EST. PATIENT-LVL I: ICD-10-PCS | Mod: PBBFAC,,,

## 2022-09-08 PROCEDURE — 96372 THER/PROPH/DIAG INJ SC/IM: CPT | Mod: PBBFAC,PN

## 2022-09-08 PROCEDURE — 99211 OFF/OP EST MAY X REQ PHY/QHP: CPT | Mod: PBBFAC,PN

## 2022-09-08 PROCEDURE — 99999 PR PBB SHADOW E&M-EST. PATIENT-LVL I: CPT | Mod: PBBFAC,,,

## 2022-09-08 RX ORDER — CYANOCOBALAMIN 1000 UG/ML
1000 INJECTION, SOLUTION INTRAMUSCULAR; SUBCUTANEOUS
Status: COMPLETED | OUTPATIENT
Start: 2022-09-08 | End: 2022-09-08

## 2022-09-08 RX ADMIN — CYANOCOBALAMIN 1000 MCG: 1000 INJECTION INTRAMUSCULAR; SUBCUTANEOUS at 01:09

## 2022-09-08 NOTE — PROGRESS NOTES
After obtaining consent, and per orders of Dr. Brito, injection of B12 given by Jazmin Deutsch. Patient instructed to remain in clinic for 20 minutes afterwards, and to report any adverse reaction to me immediately.

## 2022-09-30 ENCOUNTER — CLINICAL SUPPORT (OUTPATIENT)
Dept: FAMILY MEDICINE | Facility: CLINIC | Age: 37
End: 2022-09-30
Payer: MEDICAID

## 2022-09-30 DIAGNOSIS — E53.8 B12 DEFICIENCY: Primary | ICD-10-CM

## 2022-09-30 DIAGNOSIS — G63 VITAMIN B12 DEFICIENCY NEUROPATHY: ICD-10-CM

## 2022-09-30 DIAGNOSIS — E53.8 VITAMIN B12 DEFICIENCY NEUROPATHY: ICD-10-CM

## 2022-09-30 PROCEDURE — 96372 THER/PROPH/DIAG INJ SC/IM: CPT | Mod: PBBFAC,PN

## 2022-09-30 RX ORDER — CYANOCOBALAMIN 1000 UG/ML
1000 INJECTION, SOLUTION INTRAMUSCULAR; SUBCUTANEOUS
Status: COMPLETED | OUTPATIENT
Start: 2022-09-30 | End: 2022-09-30

## 2022-09-30 RX ADMIN — CYANOCOBALAMIN 1000 MCG: 1000 INJECTION INTRAMUSCULAR; SUBCUTANEOUS at 01:09

## 2022-10-14 ENCOUNTER — PATIENT MESSAGE (OUTPATIENT)
Dept: FAMILY MEDICINE | Facility: CLINIC | Age: 37
End: 2022-10-14
Payer: MEDICAID

## 2022-10-21 ENCOUNTER — CLINICAL SUPPORT (OUTPATIENT)
Dept: FAMILY MEDICINE | Facility: CLINIC | Age: 37
End: 2022-10-21
Payer: MEDICAID

## 2022-10-21 DIAGNOSIS — E53.8 VITAMIN B12 DEFICIENCY NEUROPATHY: Primary | ICD-10-CM

## 2022-10-21 DIAGNOSIS — G63 VITAMIN B12 DEFICIENCY NEUROPATHY: Primary | ICD-10-CM

## 2022-10-21 NOTE — PROGRESS NOTES
Corina Liu arrive to clinic awake and alert.  Pt verified name and .   Allergies reviewed with patient.  Pt given B12 via Intramuscular Right arm per provider orders.    Well tolerated by patient.  denies further needs.    Kalani Jauregui LPN

## 2022-10-24 ENCOUNTER — PATIENT MESSAGE (OUTPATIENT)
Dept: FAMILY MEDICINE | Facility: CLINIC | Age: 37
End: 2022-10-24
Payer: MEDICAID

## 2022-10-24 ENCOUNTER — TELEPHONE (OUTPATIENT)
Dept: FAMILY MEDICINE | Facility: CLINIC | Age: 37
End: 2022-10-24
Payer: MEDICAID

## 2022-10-24 NOTE — TELEPHONE ENCOUNTER
----- Message from Abad Nolan sent at 10/24/2022 11:27 AM CDT -----  Contact: Pt  Type: Needs Medical Advice    Who Called:Pt  Best Call Back Number:[phone]    Additional Information Requesting a call back regarding Pt was calling in regards to there medication lisdexamfetamine (VYVANSE) 60 MG capsule pt  was calling to check the status of the refill pt stated they have not gotten any updates please call when available Thank you  Please Advise-Thank you

## 2022-10-24 NOTE — TELEPHONE ENCOUNTER
----- Message from Harvinder Escoto sent at 10/24/2022  4:34 PM CDT -----  Contact: self  Type: RX Refill Request        Who Called: Patient   Refill or New Rx: Refill   RX Name and Strength: lisdexamfetamine (VYVANSE) 60 MG capsule  How is the patient currently taking it? (ex. 1XDay): as directed   Is this a 30 day or 90 day RX: 30 day supply   Preferred Pharmacy with phone number:   [id number] - [place], [place] - [address]  [address]  [place] [address]  Phone: [phone] Fax: [phone]  Local or Mail Order: local   Ordering Provider: Dr. Ramandeep Woodall Call Back Number: [phone]  Additional Information: PLZ FILL IN THE MORNING. THANK YOU

## 2022-10-25 ENCOUNTER — PATIENT MESSAGE (OUTPATIENT)
Dept: FAMILY MEDICINE | Facility: CLINIC | Age: 37
End: 2022-10-25
Payer: MEDICAID

## 2022-10-25 DIAGNOSIS — F50.81 BINGE EATING DISORDER: ICD-10-CM

## 2022-10-25 DIAGNOSIS — Z79.899 ENCOUNTER FOR LONG-TERM CURRENT USE OF MEDICATION: ICD-10-CM

## 2022-10-25 RX ORDER — LISDEXAMFETAMINE DIMESYLATE 60 MG/1
60 CAPSULE ORAL EVERY MORNING
Qty: 30 CAPSULE | Refills: 0 | Status: SHIPPED | OUTPATIENT
Start: 2022-10-25 | End: 2022-11-30 | Stop reason: SDUPTHER

## 2022-10-25 NOTE — TELEPHONE ENCOUNTER
Dr. Dsouza, can you submit this for patient. Pa not Completed. RX sent 10.13.22   I cannot complete questions under Dr. Tse due to not having her CHARU number

## 2022-10-27 ENCOUNTER — TELEPHONE (OUTPATIENT)
Dept: FAMILY MEDICINE | Facility: CLINIC | Age: 37
End: 2022-10-27
Payer: MEDICAID

## 2022-10-27 NOTE — TELEPHONE ENCOUNTER
----- Message from Aidan Bryan sent at 10/27/2022 11:18 AM CDT -----  Regarding: pt called  Name of Who is Calling: SEUN GUTIERRES [[id number]]      What is the request in detail: pt called requesting appt she is not feeling well.Please advise      Can the clinic reply by [facility]: no      What Number to Call Back if not in [facility]:[phone] (home)

## 2022-10-28 ENCOUNTER — OFFICE VISIT (OUTPATIENT)
Dept: FAMILY MEDICINE | Facility: CLINIC | Age: 37
End: 2022-10-28
Payer: MEDICAID

## 2022-10-28 VITALS
HEIGHT: 60 IN | OXYGEN SATURATION: 98 % | BODY MASS INDEX: 33.93 KG/M2 | DIASTOLIC BLOOD PRESSURE: 74 MMHG | WEIGHT: 172.81 LBS | HEART RATE: 95 BPM | SYSTOLIC BLOOD PRESSURE: 112 MMHG

## 2022-10-28 DIAGNOSIS — B37.31 YEAST VAGINITIS: ICD-10-CM

## 2022-10-28 DIAGNOSIS — A60.04 HERPES SIMPLEX VULVOVAGINITIS: Primary | ICD-10-CM

## 2022-10-28 DIAGNOSIS — H65.04 RECURRENT ACUTE SEROUS OTITIS MEDIA OF RIGHT EAR: ICD-10-CM

## 2022-10-28 PROCEDURE — 3078F DIAST BP <80 MM HG: CPT | Mod: CPTII,,, | Performed by: FAMILY MEDICINE

## 2022-10-28 PROCEDURE — 99214 PR OFFICE/OUTPT VISIT, EST, LEVL IV, 30-39 MIN: ICD-10-PCS | Mod: S$PBB,,, | Performed by: FAMILY MEDICINE

## 2022-10-28 PROCEDURE — 3078F PR MOST RECENT DIASTOLIC BLOOD PRESSURE < 80 MM HG: ICD-10-PCS | Mod: CPTII,,, | Performed by: FAMILY MEDICINE

## 2022-10-28 PROCEDURE — 3008F PR BODY MASS INDEX (BMI) DOCUMENTED: ICD-10-PCS | Mod: CPTII,,, | Performed by: FAMILY MEDICINE

## 2022-10-28 PROCEDURE — 99214 OFFICE O/P EST MOD 30 MIN: CPT | Mod: S$PBB,,, | Performed by: FAMILY MEDICINE

## 2022-10-28 PROCEDURE — 3074F SYST BP LT 130 MM HG: CPT | Mod: CPTII,,, | Performed by: FAMILY MEDICINE

## 2022-10-28 PROCEDURE — 99999 PR PBB SHADOW E&M-EST. PATIENT-LVL IV: ICD-10-PCS | Mod: PBBFAC,,, | Performed by: FAMILY MEDICINE

## 2022-10-28 PROCEDURE — 1160F RVW MEDS BY RX/DR IN RCRD: CPT | Mod: CPTII,,, | Performed by: FAMILY MEDICINE

## 2022-10-28 PROCEDURE — 3008F BODY MASS INDEX DOCD: CPT | Mod: CPTII,,, | Performed by: FAMILY MEDICINE

## 2022-10-28 PROCEDURE — 99999 PR PBB SHADOW E&M-EST. PATIENT-LVL IV: CPT | Mod: PBBFAC,,, | Performed by: FAMILY MEDICINE

## 2022-10-28 PROCEDURE — 99214 OFFICE O/P EST MOD 30 MIN: CPT | Mod: PBBFAC,PN | Performed by: FAMILY MEDICINE

## 2022-10-28 PROCEDURE — 1159F PR MEDICATION LIST DOCUMENTED IN MEDICAL RECORD: ICD-10-PCS | Mod: CPTII,,, | Performed by: FAMILY MEDICINE

## 2022-10-28 PROCEDURE — 1160F PR REVIEW ALL MEDS BY PRESCRIBER/CLIN PHARMACIST DOCUMENTED: ICD-10-PCS | Mod: CPTII,,, | Performed by: FAMILY MEDICINE

## 2022-10-28 PROCEDURE — 1159F MED LIST DOCD IN RCRD: CPT | Mod: CPTII,,, | Performed by: FAMILY MEDICINE

## 2022-10-28 PROCEDURE — 3074F PR MOST RECENT SYSTOLIC BLOOD PRESSURE < 130 MM HG: ICD-10-PCS | Mod: CPTII,,, | Performed by: FAMILY MEDICINE

## 2022-10-28 RX ORDER — FLUCONAZOLE 150 MG/1
TABLET ORAL
Qty: 2 TABLET | Refills: 0 | Status: SHIPPED | OUTPATIENT
Start: 2022-10-28 | End: 2022-11-30 | Stop reason: SDUPTHER

## 2022-10-28 RX ORDER — CEFDINIR 300 MG/1
300 CAPSULE ORAL 2 TIMES DAILY
COMMUNITY
Start: 2022-10-21 | End: 2022-12-12

## 2022-10-28 RX ORDER — VALACYCLOVIR HYDROCHLORIDE 1 G/1
1000 TABLET, FILM COATED ORAL DAILY
Qty: 5 TABLET | Refills: 6 | Status: SHIPPED | OUTPATIENT
Start: 2022-10-28 | End: 2022-11-30 | Stop reason: SDUPTHER

## 2022-10-28 RX ORDER — PREDNISONE 20 MG/1
40 TABLET ORAL DAILY
Qty: 10 TABLET | Refills: 0 | Status: SHIPPED | OUTPATIENT
Start: 2022-10-28 | End: 2022-11-02

## 2022-10-28 RX ORDER — AMOXICILLIN AND CLAVULANATE POTASSIUM 875; 125 MG/1; MG/1
1 TABLET, FILM COATED ORAL EVERY 12 HOURS
Qty: 20 TABLET | Refills: 0 | Status: SHIPPED | OUTPATIENT
Start: 2022-10-28 | End: 2022-11-07

## 2022-10-28 NOTE — PROGRESS NOTES
Subjective:       Patient ID: Corina Liu is a 37 y.o. female.    Chief Complaint: Cough (Pt states her son and nephew were recently diagnosed with RSV. ), Genital Warts (Pt was recently diagnosed and given medication for 1 mth but feels like it never fully helped. ), Nasal Congestion, Sore Throat, Ear Fullness (Bilaterally, right more than left), and Diarrhea    Cough. Likely viral- exposed to son and nephoew who have RSV.     She however complains of the addition of significant earfullness and pain. She has not been able to shake it. She doesdeny fevers. Has been ongoing > 1 week. She was also treated recently for HSV. Would like to continue treatment as she does not feel like the problem fully resolved.     Review of Systems   Constitutional:  Positive for activity change. Negative for fever.   HENT:  Positive for nasal congestion, ear pain, rhinorrhea and sinus pressure/congestion.    Respiratory:  Positive for cough. Negative for shortness of breath.    Cardiovascular:  Negative for chest pain.   Gastrointestinal:  Positive for diarrhea.       Objective:      Physical Exam  Vitals and nursing note reviewed.   Constitutional:       General: She is not in acute distress.     Appearance: She is not ill-appearing.   HENT:      Right Ear: No drainage or swelling. A middle ear effusion is present. Tympanic membrane is injected.   Cardiovascular:      Rate and Rhythm: Normal rate and regular rhythm.      Heart sounds: No murmur heard.  Pulmonary:      Effort: Pulmonary effort is normal.      Breath sounds: Normal breath sounds. No wheezing.   Skin:     General: Skin is warm and dry.      Findings: No rash.   Neurological:      Mental Status: She is alert.   Psychiatric:         Mood and Affect: Mood normal.         Behavior: Behavior normal.       Assessment:       1. Herpes simplex vulvovaginitis    2. Recurrent acute serous otitis media of right ear    3. Yeast vaginitis        Plan:       Problem List  Items Addressed This Visit    None  Visit Diagnoses       Herpes simplex vulvovaginitis    -  Primary    Recurrent acute serous otitis media of right ear        Relevant Medications    amoxicillin-clavulanate 875-125mg (AUGMENTIN) 875-125 mg per tablet    Other Relevant Orders    Ambulatory referral/consult to ENT    Yeast vaginitis        Relevant Medications    fluconazole (DIFLUCAN) 150 MG Tab            New problems above. Treated as above. Referral as above.

## 2022-11-30 ENCOUNTER — TELEPHONE (OUTPATIENT)
Dept: FAMILY MEDICINE | Facility: CLINIC | Age: 37
End: 2022-11-30

## 2022-11-30 ENCOUNTER — OFFICE VISIT (OUTPATIENT)
Dept: FAMILY MEDICINE | Facility: CLINIC | Age: 37
End: 2022-11-30
Payer: MEDICAID

## 2022-11-30 ENCOUNTER — LAB VISIT (OUTPATIENT)
Dept: LAB | Facility: HOSPITAL | Age: 37
End: 2022-11-30
Payer: MEDICAID

## 2022-11-30 VITALS
OXYGEN SATURATION: 98 % | HEART RATE: 77 BPM | DIASTOLIC BLOOD PRESSURE: 76 MMHG | BODY MASS INDEX: 33.4 KG/M2 | WEIGHT: 171 LBS | SYSTOLIC BLOOD PRESSURE: 122 MMHG

## 2022-11-30 DIAGNOSIS — M54.41 CHRONIC MIDLINE LOW BACK PAIN WITH BILATERAL SCIATICA: ICD-10-CM

## 2022-11-30 DIAGNOSIS — M50.30 DDD (DEGENERATIVE DISC DISEASE), CERVICAL: ICD-10-CM

## 2022-11-30 DIAGNOSIS — Z79.899 ENCOUNTER FOR LONG-TERM CURRENT USE OF MEDICATION: ICD-10-CM

## 2022-11-30 DIAGNOSIS — M48.00 SPINAL STENOSIS, UNSPECIFIED SPINAL REGION: ICD-10-CM

## 2022-11-30 DIAGNOSIS — E07.9 THYROID DISEASE: ICD-10-CM

## 2022-11-30 DIAGNOSIS — Z23 NEED FOR VACCINATION: ICD-10-CM

## 2022-11-30 DIAGNOSIS — R23.2 HOT FLASHES: ICD-10-CM

## 2022-11-30 DIAGNOSIS — Z76.0 MEDICATION REFILL: Primary | ICD-10-CM

## 2022-11-30 DIAGNOSIS — M51.36 DDD (DEGENERATIVE DISC DISEASE), LUMBAR: ICD-10-CM

## 2022-11-30 DIAGNOSIS — F50.81 BINGE EATING DISORDER: ICD-10-CM

## 2022-11-30 DIAGNOSIS — B37.31 YEAST VAGINITIS: ICD-10-CM

## 2022-11-30 DIAGNOSIS — A60.04 HERPES SIMPLEX VULVOVAGINITIS: ICD-10-CM

## 2022-11-30 DIAGNOSIS — Z86.69 HISTORY OF RECURRENT EAR INFECTION: ICD-10-CM

## 2022-11-30 DIAGNOSIS — G89.29 CHRONIC MIDLINE LOW BACK PAIN WITH BILATERAL SCIATICA: ICD-10-CM

## 2022-11-30 DIAGNOSIS — M54.42 CHRONIC MIDLINE LOW BACK PAIN WITH BILATERAL SCIATICA: ICD-10-CM

## 2022-11-30 DIAGNOSIS — M67.40 GANGLION CYST: ICD-10-CM

## 2022-11-30 LAB
T4 FREE SERPL-MCNC: 0.95 NG/DL (ref 0.71–1.51)
TSH SERPL DL<=0.005 MIU/L-ACNC: 0.34 UIU/ML (ref 0.4–4)

## 2022-11-30 PROCEDURE — 3008F BODY MASS INDEX DOCD: CPT | Mod: CPTII,,,

## 2022-11-30 PROCEDURE — 3074F SYST BP LT 130 MM HG: CPT | Mod: CPTII,,,

## 2022-11-30 PROCEDURE — 80355 GABAPENTIN NON-BLOOD: CPT

## 2022-11-30 PROCEDURE — 80326 AMPHETAMINES 5 OR MORE: CPT

## 2022-11-30 PROCEDURE — 3008F PR BODY MASS INDEX (BMI) DOCUMENTED: ICD-10-PCS | Mod: CPTII,,,

## 2022-11-30 PROCEDURE — 3078F DIAST BP <80 MM HG: CPT | Mod: CPTII,,,

## 2022-11-30 PROCEDURE — 84439 ASSAY OF FREE THYROXINE: CPT

## 2022-11-30 PROCEDURE — 84207 ASSAY OF VITAMIN B-6: CPT

## 2022-11-30 PROCEDURE — 99214 PR OFFICE/OUTPT VISIT, EST, LEVL IV, 30-39 MIN: ICD-10-PCS | Mod: 25,S$PBB,,

## 2022-11-30 PROCEDURE — 99999 PR PBB SHADOW E&M-EST. PATIENT-LVL III: CPT | Mod: PBBFAC,,,

## 2022-11-30 PROCEDURE — 84443 ASSAY THYROID STIM HORMONE: CPT

## 2022-11-30 PROCEDURE — 3078F PR MOST RECENT DIASTOLIC BLOOD PRESSURE < 80 MM HG: ICD-10-PCS | Mod: CPTII,,,

## 2022-11-30 PROCEDURE — 1159F MED LIST DOCD IN RCRD: CPT | Mod: CPTII,,,

## 2022-11-30 PROCEDURE — 99214 OFFICE O/P EST MOD 30 MIN: CPT | Mod: 25,S$PBB,,

## 2022-11-30 PROCEDURE — 99999 PR PBB SHADOW E&M-EST. PATIENT-LVL III: ICD-10-PCS | Mod: PBBFAC,,,

## 2022-11-30 PROCEDURE — 99213 OFFICE O/P EST LOW 20 MIN: CPT | Mod: PBBFAC,PN

## 2022-11-30 PROCEDURE — 1159F PR MEDICATION LIST DOCUMENTED IN MEDICAL RECORD: ICD-10-PCS | Mod: CPTII,,,

## 2022-11-30 PROCEDURE — 3074F PR MOST RECENT SYSTOLIC BLOOD PRESSURE < 130 MM HG: ICD-10-PCS | Mod: CPTII,,,

## 2022-11-30 RX ORDER — LISDEXAMFETAMINE DIMESYLATE 60 MG/1
60 CAPSULE ORAL EVERY MORNING
Qty: 30 CAPSULE | Refills: 0 | Status: SHIPPED | OUTPATIENT
Start: 2022-11-30 | End: 2022-12-12 | Stop reason: SDUPTHER

## 2022-11-30 RX ORDER — LISDEXAMFETAMINE DIMESYLATE 60 MG/1
60 CAPSULE ORAL EVERY MORNING
Qty: 30 CAPSULE | Refills: 0 | Status: CANCELLED | OUTPATIENT
Start: 2022-11-30

## 2022-11-30 RX ORDER — FLUCONAZOLE 150 MG/1
TABLET ORAL
Qty: 2 TABLET | Refills: 0 | Status: SHIPPED | OUTPATIENT
Start: 2022-11-30 | End: 2023-02-03

## 2022-11-30 RX ORDER — GABAPENTIN 600 MG/1
600 TABLET ORAL 3 TIMES DAILY
Qty: 90 TABLET | Refills: 5 | Status: SHIPPED | OUTPATIENT
Start: 2022-11-30 | End: 2023-06-26 | Stop reason: SDUPTHER

## 2022-11-30 RX ORDER — VALACYCLOVIR HYDROCHLORIDE 1 G/1
1000 TABLET, FILM COATED ORAL EVERY 12 HOURS
Qty: 20 TABLET | Refills: 0 | Status: SHIPPED | OUTPATIENT
Start: 2022-11-30 | End: 2023-01-13 | Stop reason: SDUPTHER

## 2022-11-30 RX ORDER — FLUCONAZOLE 150 MG/1
TABLET ORAL
Qty: 2 TABLET | Refills: 0 | Status: CANCELLED | OUTPATIENT
Start: 2022-11-30

## 2022-11-30 RX ORDER — LEVOTHYROXINE SODIUM 112 UG/1
112 TABLET ORAL
Qty: 30 TABLET | Refills: 11 | Status: CANCELLED | OUTPATIENT
Start: 2022-11-30 | End: 2023-11-30

## 2022-11-30 RX ORDER — GABAPENTIN 300 MG/1
600 CAPSULE ORAL 3 TIMES DAILY
Qty: 180 CAPSULE | Refills: 2 | Status: SHIPPED | OUTPATIENT
Start: 2022-11-30 | End: 2022-11-30 | Stop reason: SDUPTHER

## 2022-11-30 RX ORDER — GABAPENTIN 300 MG/1
600 CAPSULE ORAL NIGHTLY
Qty: 180 CAPSULE | Refills: 1 | Status: CANCELLED | OUTPATIENT
Start: 2022-11-30

## 2022-11-30 NOTE — PROGRESS NOTES
[facility] - Clinic Visit Note    Subjective:           Chief Complaint: Follow-up (Follow up for medication)        Corina Liu is a 37 y.o. female presenting to clinic today for 3 month f/u and med refill of Vyvanse but also has additional concerns.   She reports initially being put on Vyvanse for beinge eating, she has been taking it for years, however, now insurance will not cover due to diagnosis. Discussed I cannot diagnose with adhd, she will have to be evaluated by an MD. She went without her medication for a couple weeks and felt like she could not function. She states the insurance company requested a peer to peer, thus not completed yet. She refused alternative medications.   Would like to increase her gabapentin back up to 600mg TID, she had better pain response at this dose.   She has chronic pain in her back that is affecting her everyday life, cannot complete basic household tasks like sweeping. She would like a pain management referral. It was discussed she will have to get with insurance to see who will accept medicaid.   Reports new diagnosis of genital herpes and has a lesion that has not went away.   Needs refill on Diflucan, secondary to antibiotic   Had labs done today, will wait on TSH level before refilling Synthroid. She reports having about 4-5 pills left.  Never heard about ENT referral placed by Dr. Boles, notified still pending. This is for chronic sinus and ear infections  Never heart from Ortho referral for ganglion cyst.    Review of Systems   Constitutional:  Negative for chills and fever.   HENT:  Positive for ear pain. Negative for congestion.    Respiratory:  Negative for cough and shortness of breath.    Cardiovascular:  Negative for chest pain and leg swelling.   Gastrointestinal:  Negative for abdominal pain, constipation, diarrhea, nausea and vomiting.   Genitourinary:  Negative for difficulty urinating.        Vaginal hepatic lesion   Musculoskeletal:   Positive for back pain.        Cyst on wrist and ankle   Neurological:  Positive for numbness. Negative for dizziness and headaches.   Psychiatric/Behavioral:  Positive for decreased concentration. The patient is nervous/anxious.    All other systems reviewed and are negative.        Objective:      /76 (BP Location: Left arm, Patient Position: Sitting, BP Method: Medium (Manual))   Pulse 77   Wt 77.6 kg (171 lb)   LMP 08/27/2013   SpO2 98%   BMI 33.40 kg/m²   Physical Exam  Vitals and nursing note reviewed.   Constitutional:       General: She is not in acute distress.     Appearance: Normal appearance. She is not ill-appearing.   HENT:      Head: Normocephalic and atraumatic.      Nose: Nose normal.   Eyes:      Pupils: Pupils are equal, round, and reactive to light.   Cardiovascular:      Rate and Rhythm: Normal rate and regular rhythm.      Heart sounds: Normal heart sounds.   Pulmonary:      Effort: Pulmonary effort is normal.      Breath sounds: Normal breath sounds.   Abdominal:      General: Abdomen is flat.   Musculoskeletal:         General: Normal range of motion.      Right wrist: Deformity present.      Cervical back: Normal range of motion.      Right ankle: Deformity present.   Skin:     General: Skin is warm and dry.   Neurological:      Mental Status: She is alert and oriented to person, place, and time.   Psychiatric:         Mood and Affect: Mood normal. Affect is tearful.         Speech: Speech normal.         Behavior: Behavior is hyperactive.         Thought Content: Thought content normal. Thought content does not include suicidal ideation.         Cognition and Memory: Cognition normal.         Judgment: Judgment is impulsive.       Assessment and Plan:       1. Need for vaccination    2. Medication refill  -     gabapentin (NEURONTIN) 300 MG capsule; Take 2 capsules (600 mg total) by mouth 3 (three) times daily.  Dispense: 180 capsule; Refill: 2    3. Hot flashes    4. Thyroid  disease  Overview:  hypothyroid, pappilary carcinoma      5. Binge eating disorder  -     lisdexamfetamine (VYVANSE) 60 MG capsule; Take 1 capsule (60 mg total) by mouth every morning.  Dispense: 30 capsule; Refill: 0    6. Encounter for long-term current use of medication  -     lisdexamfetamine (VYVANSE) 60 MG capsule; Take 1 capsule (60 mg total) by mouth every morning.  Dispense: 30 capsule; Refill: 0    7. Yeast vaginitis  -     fluconazole (DIFLUCAN) 150 MG Tab; Take one tablet (150mg) by mouth once; May repeat in 72 hours if symptoms persist.  Dispense: 2 tablet; Refill: 0    8. Herpes simplex vulvovaginitis  -     valACYclovir (VALTREX) 1000 MG tablet; Take 1 tablet (1,000 mg total) by mouth every 12 (twelve) hours. for 10 days  Dispense: 20 tablet; Refill: 0    9. Ganglion cyst  -     Ambulatory referral/consult to Orthopedics; Future; Expected date: 12/07/2022    10. Chronic midline low back pain with bilateral sciatica  -     Ambulatory referral/consult to Pain Clinic; Future; Expected date: 12/07/2022    11. Spinal stenosis, unspecified spinal region  -     Ambulatory referral/consult to Pain Clinic; Future; Expected date: 12/07/2022    12. DDD (degenerative disc disease), cervical    13. DDD (degenerative disc disease), lumbar  -     Ambulatory referral/consult to Pain Clinic; Future; Expected date: 12/07/2022    14. History of recurrent ear infection        PLAN: Applies to all diagnosis and orders listed above.  - increased gabapentin  - will eval TSH before refilling synthroid  -  reviewed, refilled vyvanse  - printed and gave her ent referral, ortho referral, and pain management referral (pt to find md who accepts medicaid)  - refilled diflucan  - refilled valacyclovir  - Follow up in about 2 weeks (around 12/14/2022) for eval ADHD with MD.     Discussed with patient the plan of care. Medications reviewed. Medication side effects discussed. Patient has no questions or concerns at this time.  Reviewed, monitored, evaluated, and assessed chronic conditions as outlined above. Reviewed family, medical, surgical, and social history. Reviewed and discussed labs and imaging from the last 3 months.  Informed patient to please notify me with any questions or concerns at anytime.    Thank you,  ANUSHKA Irwin  Family Medicine  [facility]

## 2022-12-06 ENCOUNTER — PATIENT MESSAGE (OUTPATIENT)
Dept: FAMILY MEDICINE | Facility: CLINIC | Age: 37
End: 2022-12-06
Payer: MEDICAID

## 2022-12-06 DIAGNOSIS — E07.9 THYROID DISEASE: ICD-10-CM

## 2022-12-06 DIAGNOSIS — R53.82 CHRONIC FATIGUE: ICD-10-CM

## 2022-12-06 DIAGNOSIS — J32.9 CHRONIC SINUSITIS, UNSPECIFIED LOCATION: Primary | ICD-10-CM

## 2022-12-06 DIAGNOSIS — R23.2 HOT FLASHES: ICD-10-CM

## 2022-12-06 LAB — PYRIDOXAL SERPL-MCNC: 23 UG/L (ref 5–50)

## 2022-12-06 RX ORDER — LEVOTHYROXINE SODIUM 100 UG/1
100 TABLET ORAL
Qty: 90 TABLET | Refills: 0 | Status: SHIPPED | OUTPATIENT
Start: 2022-12-06 | End: 2023-03-28 | Stop reason: SDUPTHER

## 2022-12-06 RX ORDER — DEXAMETHASONE SODIUM PHOSPHATE 4 MG/ML
8 INJECTION, SOLUTION INTRA-ARTICULAR; INTRALESIONAL; INTRAMUSCULAR; INTRAVENOUS; SOFT TISSUE ONCE
Status: DISCONTINUED | OUTPATIENT
Start: 2022-12-06 | End: 2023-02-22

## 2022-12-06 RX ORDER — CYANOCOBALAMIN 1000 UG/ML
1000 INJECTION, SOLUTION INTRAMUSCULAR; SUBCUTANEOUS
Status: DISCONTINUED | OUTPATIENT
Start: 2022-12-06 | End: 2023-06-26

## 2022-12-07 LAB
6MAM UR QL: NOT DETECTED
7AMINOCLONAZEPAM UR QL: NOT DETECTED
A-OH ALPRAZ UR QL: NOT DETECTED
ALPHA-OH-MIDAZOLAM: NOT DETECTED
ALPRAZ UR QL: NOT DETECTED
AMPHET UR QL SCN: PRESENT
ANNOTATION COMMENT IMP: NORMAL
ANNOTATION COMMENT IMP: NORMAL
BARBITURATES UR QL: NOT DETECTED
BUPRENORPHINE UR QL: NOT DETECTED
BZE UR QL: NOT DETECTED
CARBOXYTHC UR QL: PRESENT
CARISOPRODOL UR QL: NOT DETECTED
CLONAZEPAM UR QL: NOT DETECTED
CODEINE UR QL: NOT DETECTED
CREAT UR-MCNC: 193.8 MG/DL (ref 20–400)
DIAZEPAM UR QL: NOT DETECTED
ETHYL GLUCURONIDE UR QL: NOT DETECTED
FENTANYL UR QL: NOT DETECTED
GABAPENTIN: PRESENT
HYDROCODONE UR QL: NOT DETECTED
HYDROMORPHONE UR QL: NOT DETECTED
LORAZEPAM UR QL: NOT DETECTED
MDA UR QL: NOT DETECTED
MDEA UR QL: NOT DETECTED
MDMA UR QL: NOT DETECTED
ME-PHENIDATE UR QL: NOT DETECTED
METHADONE UR QL: NOT DETECTED
METHAMPHET UR QL: NOT DETECTED
MIDAZOLAM UR QL SCN: NOT DETECTED
MORPHINE UR QL: NOT DETECTED
NALOXONE: NOT DETECTED
NORBUPRENORPHINE UR QL CFM: NOT DETECTED
NORDIAZEPAM UR QL: NOT DETECTED
NORFENTANYL UR QL: NOT DETECTED
NORHYDROCODONE UR QL CFM: NOT DETECTED
NORMEPERIDINE UR QL CFM: NOT DETECTED
NOROXYCODONE UR QL CFM: NOT DETECTED
NOROXYMORPHONE UR QL SCN: NOT DETECTED
OXAZEPAM UR QL: NOT DETECTED
OXYCODONE UR QL: NOT DETECTED
OXYMORPHONE UR QL: NOT DETECTED
PATHOLOGY STUDY: NORMAL
PCP UR QL: NOT DETECTED
PHENTERMINE UR QL: NOT DETECTED
PREGABALIN: NOT DETECTED
SERVICE CMNT-IMP: NORMAL
TAPENTADOL UR QL SCN: NOT DETECTED
TAPENTADOL UR QL SCN: NOT DETECTED
TEMAZEPAM UR QL: NOT DETECTED
TRAMADOL UR QL: NOT DETECTED
ZOLPIDEM METABOLITE: NOT DETECTED
ZOLPIDEM UR QL: NOT DETECTED

## 2022-12-12 ENCOUNTER — OFFICE VISIT (OUTPATIENT)
Dept: FAMILY MEDICINE | Facility: CLINIC | Age: 37
End: 2022-12-12
Payer: MEDICAID

## 2022-12-12 VITALS
RESPIRATION RATE: 19 BRPM | HEIGHT: 60 IN | HEART RATE: 87 BPM | OXYGEN SATURATION: 97 % | DIASTOLIC BLOOD PRESSURE: 68 MMHG | SYSTOLIC BLOOD PRESSURE: 125 MMHG | BODY MASS INDEX: 33.93 KG/M2 | WEIGHT: 172.81 LBS

## 2022-12-12 DIAGNOSIS — Z79.899 ENCOUNTER FOR LONG-TERM CURRENT USE OF MEDICATION: ICD-10-CM

## 2022-12-12 DIAGNOSIS — R07.0 THROAT PAIN: Primary | ICD-10-CM

## 2022-12-12 DIAGNOSIS — F50.81 BINGE EATING DISORDER: ICD-10-CM

## 2022-12-12 DIAGNOSIS — J40 BRONCHITIS: ICD-10-CM

## 2022-12-12 DIAGNOSIS — F90.2 ATTENTION DEFICIT HYPERACTIVITY DISORDER (ADHD), COMBINED TYPE: ICD-10-CM

## 2022-12-12 PROCEDURE — 99214 OFFICE O/P EST MOD 30 MIN: CPT | Mod: S$PBB,,, | Performed by: FAMILY MEDICINE

## 2022-12-12 PROCEDURE — 3008F PR BODY MASS INDEX (BMI) DOCUMENTED: ICD-10-PCS | Mod: CPTII,,, | Performed by: FAMILY MEDICINE

## 2022-12-12 PROCEDURE — 1159F MED LIST DOCD IN RCRD: CPT | Mod: CPTII,,, | Performed by: FAMILY MEDICINE

## 2022-12-12 PROCEDURE — 3074F SYST BP LT 130 MM HG: CPT | Mod: CPTII,,, | Performed by: FAMILY MEDICINE

## 2022-12-12 PROCEDURE — 3074F PR MOST RECENT SYSTOLIC BLOOD PRESSURE < 130 MM HG: ICD-10-PCS | Mod: CPTII,,, | Performed by: FAMILY MEDICINE

## 2022-12-12 PROCEDURE — 3078F DIAST BP <80 MM HG: CPT | Mod: CPTII,,, | Performed by: FAMILY MEDICINE

## 2022-12-12 PROCEDURE — 99999 PR PBB SHADOW E&M-EST. PATIENT-LVL III: ICD-10-PCS | Mod: PBBFAC,,, | Performed by: FAMILY MEDICINE

## 2022-12-12 PROCEDURE — 3078F PR MOST RECENT DIASTOLIC BLOOD PRESSURE < 80 MM HG: ICD-10-PCS | Mod: CPTII,,, | Performed by: FAMILY MEDICINE

## 2022-12-12 PROCEDURE — 99213 OFFICE O/P EST LOW 20 MIN: CPT | Mod: PBBFAC,PN | Performed by: FAMILY MEDICINE

## 2022-12-12 PROCEDURE — 1159F PR MEDICATION LIST DOCUMENTED IN MEDICAL RECORD: ICD-10-PCS | Mod: CPTII,,, | Performed by: FAMILY MEDICINE

## 2022-12-12 PROCEDURE — 3008F BODY MASS INDEX DOCD: CPT | Mod: CPTII,,, | Performed by: FAMILY MEDICINE

## 2022-12-12 PROCEDURE — 99999 PR PBB SHADOW E&M-EST. PATIENT-LVL III: CPT | Mod: PBBFAC,,, | Performed by: FAMILY MEDICINE

## 2022-12-12 PROCEDURE — 99214 PR OFFICE/OUTPT VISIT, EST, LEVL IV, 30-39 MIN: ICD-10-PCS | Mod: S$PBB,,, | Performed by: FAMILY MEDICINE

## 2022-12-12 RX ORDER — LISDEXAMFETAMINE DIMESYLATE 60 MG/1
60 CAPSULE ORAL EVERY MORNING
Qty: 30 CAPSULE | Refills: 0 | Status: SHIPPED | OUTPATIENT
Start: 2022-12-12 | End: 2023-01-13 | Stop reason: SDUPTHER

## 2022-12-12 RX ORDER — DOXYCYCLINE HYCLATE 100 MG
100 TABLET ORAL 2 TIMES DAILY
Qty: 20 TABLET | Refills: 0 | Status: SHIPPED | OUTPATIENT
Start: 2022-12-12 | End: 2023-02-03

## 2022-12-12 NOTE — PROGRESS NOTES
Patient ID: Corina Liu is a 37 y.o. female.    Chief Complaint: Follow-up, Otalgia, and Sore Throat      Reviewed family, medical, surgical, and social history.    No cp/sob  No change in mental status  No fever  No asymmetrical limb swelling    Objective:      /68 (BP Location: Right arm, Patient Position: Sitting, BP Method: Medium (Automatic))   Pulse 87   Resp 19   Ht 5' (1.524 m)   Wt 78.4 kg (172 lb 12.8 oz)   LMP 08/27/2013   SpO2 97%   BMI 33.75 kg/m²   RRR, CTAB, s/nt/nd, no c/c/e, non-toxic appearing, no focal weakness  Assessment:       1. Throat pain    2. Attention deficit hyperactivity disorder (ADHD), combined type    3. Binge eating disorder    4. Encounter for long-term current use of medication        Plan:       Throat pain  -     Ambulatory referral/consult to ENT; Future; Expected date: 12/19/2022    Attention deficit hyperactivity disorder (ADHD), combined type  -     lisdexamfetamine (VYVANSE) 60 MG capsule; Take 1 capsule (60 mg total) by mouth every morning.  Dispense: 30 capsule; Refill: 0    Binge eating disorder    Encounter for long-term current use of medication  -     lisdexamfetamine (VYVANSE) 60 MG capsule; Take 1 capsule (60 mg total) by mouth every morning.  Dispense: 30 capsule; Refill: 0              Continue current medicines, any changes noted above  Adhd  Since childhood  Has taken adderall in the past without relief  Has been on vyvanse with significant relief  Labs, radiology studies, and procedures/notes from the last 3 months were reviewed.    Risks, benefits, and side effects were discussed with the patient. All questions were answered to the fullest satisfaction of the patient, and pt verbalized understanding and agreement to treatment plan. Pt was to call with any new or worsening symptoms, or present to the ER.

## 2023-01-27 ENCOUNTER — HOSPITAL ENCOUNTER (EMERGENCY)
Facility: HOSPITAL | Age: 38
Discharge: HOME | End: 2023-01-27
Attending: EMERGENCY MEDICINE
Payer: MEDICAID

## 2023-01-27 VITALS
WEIGHT: 180 LBS | HEIGHT: 60 IN | RESPIRATION RATE: 16 BRPM | SYSTOLIC BLOOD PRESSURE: 130 MMHG | DIASTOLIC BLOOD PRESSURE: 75 MMHG | HEART RATE: 94 BPM | TEMPERATURE: 98 F | BODY MASS INDEX: 35.34 KG/M2 | OXYGEN SATURATION: 100 %

## 2023-01-27 DIAGNOSIS — M79.673 FOOT PAIN: ICD-10-CM

## 2023-01-27 DIAGNOSIS — S80.11XA CONTUSION OF RIGHT LEG, INITIAL ENCOUNTER: ICD-10-CM
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
## 2023-01-27 DIAGNOSIS — S39.012A LUMBAR STRAIN, INITIAL ENCOUNTER: Primary | ICD-10-CM

## 2023-01-27 PROCEDURE — 25000003 PHARM REV CODE 250: Performed by: EMERGENCY MEDICINE

## 2023-01-27 PROCEDURE — 99284 EMERGENCY DEPT VISIT MOD MDM: CPT

## 2023-01-27 RX ORDER — HYDROCODONE BITARTRATE AND ACETAMINOPHEN 5; 325 MG/1; MG/1
1 TABLET ORAL EVERY 6 HOURS PRN
Qty: 12 TABLET | Refills: 0 | Status: SHIPPED | OUTPATIENT
Start: 2023-01-27 | End: 2023-02-03 | Stop reason: ALTCHOICE

## 2023-01-27 RX ORDER — CYCLOBENZAPRINE HCL 10 MG
10 TABLET ORAL 3 TIMES DAILY PRN
Qty: 15 TABLET | Refills: 0 | Status: SHIPPED | OUTPATIENT
Start: 2023-01-27 | End: 2023-02-03 | Stop reason: SDUPTHER

## 2023-01-27 RX ORDER — OXYCODONE HYDROCHLORIDE 10 MG/1
10 TABLET ORAL
Status: COMPLETED | OUTPATIENT
Start: 2023-01-27 | End: 2023-01-27

## 2023-01-27 RX ADMIN — OXYCODONE HYDROCHLORIDE 10 MG: 10 TABLET ORAL at 12:01

## 2023-01-27 NOTE — ED NOTES
Went over discharge instructions with patient.  Patient verbalizes understanding of home care instructions.  Provided written instructions as well and discussed home prescriptions.  VS stable.  Offered wheelchair, pt refused.  Pt ambulatory off floor.
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
## 2023-01-31 ENCOUNTER — TELEPHONE (OUTPATIENT)
Dept: OTOLARYNGOLOGY | Facility: CLINIC | Age: 38
End: 2023-01-31
Payer: MEDICAID

## 2023-01-31 NOTE — TELEPHONE ENCOUNTER
----- Message from Padmini Cortez sent at 1/31/2023 12:09 PM CST -----  Type:  Patient Returning Call    Who Called:Pt     Who Left Message for Patient:Surendra Moran LPN    Does the patient know what this is regarding?:yes to schedule     Would the patient rather a call back or a response via [facility]?  Call back     Best Call Back Number:[phone] (mobile)     Additional Information:

## 2023-02-03 ENCOUNTER — OFFICE VISIT (OUTPATIENT)
Dept: FAMILY MEDICINE | Facility: CLINIC | Age: 38
End: 2023-02-03
Payer: MEDICAID

## 2023-02-03 VITALS
HEART RATE: 97 BPM | OXYGEN SATURATION: 98 % | BODY MASS INDEX: 35.71 KG/M2 | HEIGHT: 60 IN | DIASTOLIC BLOOD PRESSURE: 78 MMHG | SYSTOLIC BLOOD PRESSURE: 136 MMHG | WEIGHT: 181.88 LBS | RESPIRATION RATE: 15 BRPM
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
## 2023-02-03 DIAGNOSIS — M54.50 LOW BACK PAIN RADIATING TO LOWER EXTREMITY: Primary | ICD-10-CM

## 2023-02-03 DIAGNOSIS — M79.606 LOW BACK PAIN RADIATING TO LOWER EXTREMITY: Primary | ICD-10-CM

## 2023-02-03 PROBLEM — R76.8 POSITIVE HEPATITIS C ANTIBODY TEST: Status: RESOLVED | Noted: 2020-11-23 | Resolved: 2023-02-03

## 2023-02-03 PROCEDURE — 3078F DIAST BP <80 MM HG: CPT | Mod: CPTII,,,

## 2023-02-03 PROCEDURE — 99214 OFFICE O/P EST MOD 30 MIN: CPT | Mod: S$PBB,,,

## 2023-02-03 PROCEDURE — 3075F SYST BP GE 130 - 139MM HG: CPT | Mod: CPTII,,,

## 2023-02-03 PROCEDURE — 3008F PR BODY MASS INDEX (BMI) DOCUMENTED: ICD-10-PCS | Mod: CPTII,,,

## 2023-02-03 PROCEDURE — 99999 PR PBB SHADOW E&M-EST. PATIENT-LVL III: CPT | Mod: PBBFAC,,,

## 2023-02-03 PROCEDURE — 99213 OFFICE O/P EST LOW 20 MIN: CPT | Mod: PBBFAC,PN

## 2023-02-03 PROCEDURE — 3075F PR MOST RECENT SYSTOLIC BLOOD PRESS GE 130-139MM HG: ICD-10-PCS | Mod: CPTII,,,

## 2023-02-03 PROCEDURE — 3008F BODY MASS INDEX DOCD: CPT | Mod: CPTII,,,

## 2023-02-03 PROCEDURE — 1160F RVW MEDS BY RX/DR IN RCRD: CPT | Mod: CPTII,,,

## 2023-02-03 PROCEDURE — 3078F PR MOST RECENT DIASTOLIC BLOOD PRESSURE < 80 MM HG: ICD-10-PCS | Mod: CPTII,,,

## 2023-02-03 PROCEDURE — 1160F PR REVIEW ALL MEDS BY PRESCRIBER/CLIN PHARMACIST DOCUMENTED: ICD-10-PCS | Mod: CPTII,,,

## 2023-02-03 PROCEDURE — 99214 PR OFFICE/OUTPT VISIT, EST, LEVL IV, 30-39 MIN: ICD-10-PCS | Mod: S$PBB,,,

## 2023-02-03 PROCEDURE — 1159F PR MEDICATION LIST DOCUMENTED IN MEDICAL RECORD: ICD-10-PCS | Mod: CPTII,,,

## 2023-02-03 PROCEDURE — 99999 PR PBB SHADOW E&M-EST. PATIENT-LVL III: ICD-10-PCS | Mod: PBBFAC,,,

## 2023-02-03 PROCEDURE — 1159F MED LIST DOCD IN RCRD: CPT | Mod: CPTII,,,

## 2023-02-03 RX ORDER — CYCLOBENZAPRINE HCL 10 MG
10 TABLET ORAL 3 TIMES DAILY PRN
Qty: 30 TABLET | Refills: 0 | Status: SHIPPED | OUTPATIENT
Start: 2023-02-03 | End: 2023-02-13

## 2023-02-03 RX ORDER — HYDROCODONE BITARTRATE AND ACETAMINOPHEN 10; 325 MG/1; MG/1
1 TABLET ORAL EVERY 6 HOURS PRN
Qty: 20 TABLET | Refills: 0 | Status: SHIPPED | OUTPATIENT
Start: 2023-02-03 | End: 2023-03-28 | Stop reason: SDUPTHER
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
## 2023-02-04 ENCOUNTER — HOSPITAL ENCOUNTER (OUTPATIENT)
Dept: RADIOLOGY | Facility: HOSPITAL | Age: 38
Discharge: HOME | End: 2023-02-04
Payer: MEDICAID
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
## 2023-02-04 PROCEDURE — 73030 X-RAY EXAM OF SHOULDER: CPT | Mod: 26,RT,, | Performed by: RADIOLOGY

## 2023-02-04 PROCEDURE — 73030 X-RAY EXAM OF SHOULDER: CPT | Mod: TC,RT

## 2023-02-04 PROCEDURE — 73030 XR SHOULDER COMPLETE 2 OR MORE VIEWS RIGHT: ICD-10-PCS | Mod: 26,RT,, | Performed by: RADIOLOGY

## 2023-02-13 ENCOUNTER — TELEPHONE (OUTPATIENT)
Dept: FAMILY MEDICINE | Facility: CLINIC | Age: 38
End: 2023-02-13
Payer: MEDICAID
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
## 2023-02-13 NOTE — TELEPHONE ENCOUNTER
Attempted to contact Corina Liu to discuss Scheduling an appointment.    Left voice mail to return our call at [phone] on [phone] (home).    Tiny Schulte LPN

## 2023-02-16 ENCOUNTER — CLINICAL SUPPORT (OUTPATIENT)
Dept: FAMILY MEDICINE | Facility: CLINIC | Age: 38
End: 2023-02-16
Payer: MEDICAID

## 2023-02-16 DIAGNOSIS — F90.2 ATTENTION DEFICIT HYPERACTIVITY DISORDER (ADHD), COMBINED TYPE: ICD-10-CM

## 2023-02-16 DIAGNOSIS — Z79.899 ENCOUNTER FOR LONG-TERM CURRENT USE OF MEDICATION: ICD-10-CM

## 2023-02-16 DIAGNOSIS — Z02.83 ENCOUNTER FOR DRUG SCREENING: Primary | ICD-10-CM

## 2023-02-16 PROCEDURE — 80326 AMPHETAMINES 5 OR MORE: CPT | Performed by: FAMILY MEDICINE

## 2023-02-16 RX ORDER — LISDEXAMFETAMINE DIMESYLATE 60 MG/1
60 CAPSULE ORAL EVERY MORNING
Qty: 30 CAPSULE | Refills: 0 | Status: SHIPPED | OUTPATIENT
Start: 2023-02-16 | End: 2023-03-16 | Stop reason: SDUPTHER

## 2023-02-16 RX ORDER — LISDEXAMFETAMINE DIMESYLATE 60 MG/1
60 CAPSULE ORAL EVERY MORNING
Qty: 30 CAPSULE | Refills: 0 | OUTPATIENT
Start: 2023-02-16

## 2023-02-16 NOTE — TELEPHONE ENCOUNTER
----- Message from Jacklyn Del Rosario sent at 2/16/2023  3:14 PM CST -----  Contact: pt  Type:  RX Refill Request    Who Called: pt    Refill or New Rx:refill    RX Name and Strength:lisdexamfetamine (VYVANSE) 60 MG capsule    How is the patient currently taking it? (ex. 1XDay):as directed    Is this a 30 day or 90 day RX:30    Preferred Pharmacy with phone number:  [id number] - [place], [place] - [address]  [address] [place] [address]  Phone: [phone] Fax: [phone]      Local or Mail Order:local  Ordering Provider:cayden  Would the patient rather a call back or a response via [facility]? call  Best Call Back Number:[phone] (home)     Additional Information: States she never received her Rx and would like a call.Also states she did her urine test today 2/16. Please advise thank you.        
Please see the attached refill request.  
Prescription drug monitoring program has been reviewed and is consistent with patient's prescription history. There is no evidence of early fills or obtaining controlled rx's from multiple providers.   
Detail Level: Simple

## 2023-02-17 ENCOUNTER — TELEPHONE (OUTPATIENT)
Dept: FAMILY MEDICINE | Facility: CLINIC | Age: 38
End: 2023-02-17
Payer: MEDICAID

## 2023-02-17 NOTE — TELEPHONE ENCOUNTER
Informed pt that Dr. Brito sent in vyvanse prescription to [facility] in [place] on 2/16/023. Pt voiced understanding.  AA

## 2023-02-17 NOTE — TELEPHONE ENCOUNTER
----- Message from Keesha Guan sent at 2/17/2023  2:20 PM CST -----  Contact: self  Patient is out of her Vyvanse that is supposed to take everyday. Please call patient back to advise at [phone]. Thanks!

## 2023-02-20 LAB
6MAM UR QL: NOT DETECTED
7AMINOCLONAZEPAM UR QL: NOT DETECTED
A-OH ALPRAZ UR QL: NOT DETECTED
ALPHA-OH-MIDAZOLAM: NOT DETECTED
ALPRAZ UR QL: NOT DETECTED
AMPHET UR QL SCN: PRESENT
ANNOTATION COMMENT IMP: NORMAL
ANNOTATION COMMENT IMP: NORMAL
BARBITURATES UR QL: NOT DETECTED
BUPRENORPHINE UR QL: NOT DETECTED
BZE UR QL: NOT DETECTED
CARBOXYTHC UR QL: PRESENT
CARISOPRODOL UR QL: NOT DETECTED
CLONAZEPAM UR QL: NOT DETECTED
CODEINE UR QL: NOT DETECTED
CREAT UR-MCNC: 149.2 MG/DL (ref 20–400)
DIAZEPAM UR QL: NOT DETECTED
ETHYL GLUCURONIDE UR QL: NOT DETECTED
FENTANYL UR QL: NOT DETECTED
GABAPENTIN: PRESENT
HYDROCODONE UR QL: PRESENT
HYDROMORPHONE UR QL: PRESENT
LORAZEPAM UR QL: NOT DETECTED
MDA UR QL: NOT DETECTED
MDEA UR QL: NOT DETECTED
MDMA UR QL: NOT DETECTED
ME-PHENIDATE UR QL: NOT DETECTED
METHADONE UR QL: NOT DETECTED
METHAMPHET UR QL: NOT DETECTED
MIDAZOLAM UR QL SCN: NOT DETECTED
MORPHINE UR QL: NOT DETECTED
NALOXONE: NOT DETECTED
NORBUPRENORPHINE UR QL CFM: NOT DETECTED
NORDIAZEPAM UR QL: NOT DETECTED
NORFENTANYL UR QL: NOT DETECTED
NORHYDROCODONE UR QL CFM: PRESENT
NORMEPERIDINE UR QL CFM: NOT DETECTED
NOROXYCODONE UR QL CFM: NOT DETECTED
NOROXYMORPHONE UR QL SCN: NOT DETECTED
OXAZEPAM UR QL: NOT DETECTED
OXYCODONE UR QL: NOT DETECTED
OXYMORPHONE UR QL: NOT DETECTED
PATHOLOGY STUDY: NORMAL
PCP UR QL: NOT DETECTED
PHENTERMINE UR QL: NOT DETECTED
PREGABALIN: NOT DETECTED
SERVICE CMNT-IMP: NORMAL
TAPENTADOL UR QL SCN: NOT DETECTED
TAPENTADOL UR QL SCN: NOT DETECTED
TEMAZEPAM UR QL: NOT DETECTED
TRAMADOL UR QL: NOT DETECTED
ZOLPIDEM METABOLITE: NOT DETECTED
ZOLPIDEM UR QL: NOT DETECTED

## 2023-02-22 ENCOUNTER — OFFICE VISIT (OUTPATIENT)
Dept: FAMILY MEDICINE | Facility: CLINIC | Age: 38
End: 2023-02-22
Payer: MEDICAID

## 2023-02-22 ENCOUNTER — TELEPHONE (OUTPATIENT)
Dept: FAMILY MEDICINE | Facility: CLINIC | Age: 38
End: 2023-02-22
Payer: MEDICAID

## 2023-02-22 ENCOUNTER — TELEPHONE (OUTPATIENT)
Dept: FAMILY MEDICINE | Facility: CLINIC | Age: 38
End: 2023-02-22

## 2023-02-22 VITALS
BODY MASS INDEX: 33.24 KG/M2 | OXYGEN SATURATION: 98 % | HEART RATE: 108 BPM | HEIGHT: 60 IN | DIASTOLIC BLOOD PRESSURE: 84 MMHG | SYSTOLIC BLOOD PRESSURE: 136 MMHG | WEIGHT: 169.31 LBS

## 2023-02-22 DIAGNOSIS — G47.00 INSOMNIA, UNSPECIFIED TYPE: ICD-10-CM

## 2023-02-22 DIAGNOSIS — F43.21 GRIEF: Primary | ICD-10-CM

## 2023-02-22 DIAGNOSIS — F41.9 ANXIETY: ICD-10-CM

## 2023-02-22 DIAGNOSIS — B37.9 YEAST INFECTION: ICD-10-CM

## 2023-02-22 PROCEDURE — 1160F RVW MEDS BY RX/DR IN RCRD: CPT | Mod: CPTII,,, | Performed by: FAMILY MEDICINE

## 2023-02-22 PROCEDURE — 3079F PR MOST RECENT DIASTOLIC BLOOD PRESSURE 80-89 MM HG: ICD-10-PCS | Mod: CPTII,,, | Performed by: FAMILY MEDICINE

## 2023-02-22 PROCEDURE — 1159F MED LIST DOCD IN RCRD: CPT | Mod: CPTII,,, | Performed by: FAMILY MEDICINE

## 2023-02-22 PROCEDURE — 99214 PR OFFICE/OUTPT VISIT, EST, LEVL IV, 30-39 MIN: ICD-10-PCS | Mod: S$PBB,,, | Performed by: FAMILY MEDICINE

## 2023-02-22 PROCEDURE — 99214 OFFICE O/P EST MOD 30 MIN: CPT | Mod: S$PBB,,, | Performed by: FAMILY MEDICINE

## 2023-02-22 PROCEDURE — 99999 PR PBB SHADOW E&M-EST. PATIENT-LVL III: ICD-10-PCS | Mod: PBBFAC,,, | Performed by: FAMILY MEDICINE

## 2023-02-22 PROCEDURE — 1159F PR MEDICATION LIST DOCUMENTED IN MEDICAL RECORD: ICD-10-PCS | Mod: CPTII,,, | Performed by: FAMILY MEDICINE

## 2023-02-22 PROCEDURE — 99213 OFFICE O/P EST LOW 20 MIN: CPT | Mod: PBBFAC,PN | Performed by: FAMILY MEDICINE

## 2023-02-22 PROCEDURE — 3075F PR MOST RECENT SYSTOLIC BLOOD PRESS GE 130-139MM HG: ICD-10-PCS | Mod: CPTII,,, | Performed by: FAMILY MEDICINE

## 2023-02-22 PROCEDURE — 1160F PR REVIEW ALL MEDS BY PRESCRIBER/CLIN PHARMACIST DOCUMENTED: ICD-10-PCS | Mod: CPTII,,, | Performed by: FAMILY MEDICINE

## 2023-02-22 PROCEDURE — 99999 PR PBB SHADOW E&M-EST. PATIENT-LVL III: CPT | Mod: PBBFAC,,, | Performed by: FAMILY MEDICINE

## 2023-02-22 PROCEDURE — 3079F DIAST BP 80-89 MM HG: CPT | Mod: CPTII,,, | Performed by: FAMILY MEDICINE

## 2023-02-22 PROCEDURE — 3008F PR BODY MASS INDEX (BMI) DOCUMENTED: ICD-10-PCS | Mod: CPTII,,, | Performed by: FAMILY MEDICINE

## 2023-02-22 PROCEDURE — 3008F BODY MASS INDEX DOCD: CPT | Mod: CPTII,,, | Performed by: FAMILY MEDICINE

## 2023-02-22 PROCEDURE — 3075F SYST BP GE 130 - 139MM HG: CPT | Mod: CPTII,,, | Performed by: FAMILY MEDICINE

## 2023-02-22 RX ORDER — FLUCONAZOLE 150 MG/1
TABLET ORAL
Qty: 2 TABLET | Refills: 0 | Status: SHIPPED | OUTPATIENT
Start: 2023-02-22 | End: 2023-06-26

## 2023-02-22 RX ORDER — ALPRAZOLAM 0.5 MG/1
0.5 TABLET ORAL 2 TIMES DAILY PRN
Qty: 20 TABLET | Refills: 0 | Status: SHIPPED | OUTPATIENT
Start: 2023-02-22 | End: 2023-03-03 | Stop reason: SDUPTHER
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
## 2023-03-02 DIAGNOSIS — F43.21 GRIEF: ICD-10-CM

## 2023-03-02 DIAGNOSIS — G47.00 INSOMNIA, UNSPECIFIED TYPE: ICD-10-CM

## 2023-03-02 DIAGNOSIS — F41.9 ANXIETY: ICD-10-CM

## 2023-03-02 RX ORDER — ALPRAZOLAM 0.5 MG/1
0.5 TABLET ORAL 2 TIMES DAILY PRN
Qty: 20 TABLET | Refills: 0 | OUTPATIENT
Start: 2023-03-02 | End: 2023-03-12

## 2023-03-03 ENCOUNTER — PATIENT MESSAGE (OUTPATIENT)
Dept: FAMILY MEDICINE | Facility: CLINIC | Age: 38
End: 2023-03-03
Payer: MEDICAID

## 2023-03-03 RX ORDER — ALPRAZOLAM 0.5 MG/1
0.5 TABLET ORAL 2 TIMES DAILY PRN
Qty: 20 TABLET | Refills: 1 | Status: SHIPPED | OUTPATIENT
Start: 2023-03-03 | End: 2023-03-22 | Stop reason: SDUPTHER

## 2023-03-03 NOTE — TELEPHONE ENCOUNTER
Informed pt RX has been sent to provider. Pt states she is going to pay for her husbands michael box. Pt voiced understanding.

## 2023-03-03 NOTE — TELEPHONE ENCOUNTER
----- Message from Yun Wilder sent at 3/3/2023  1:51 PM CST -----  Pt came in office. States she needs somebody  to call her about meds. She will be out by today. [phone]

## 2023-03-28 ENCOUNTER — PATIENT MESSAGE (OUTPATIENT)
Dept: FAMILY MEDICINE | Facility: CLINIC | Age: 38
End: 2023-03-28

## 2023-03-28 ENCOUNTER — OFFICE VISIT (OUTPATIENT)
Dept: FAMILY MEDICINE | Facility: CLINIC | Age: 38
End: 2023-03-28
Payer: MEDICAID

## 2023-03-28 ENCOUNTER — LAB VISIT (OUTPATIENT)
Dept: LAB | Facility: HOSPITAL | Age: 38
End: 2023-03-28
Attending: STUDENT IN AN ORGANIZED HEALTH CARE EDUCATION/TRAINING PROGRAM
Payer: MEDICAID

## 2023-03-28 VITALS
HEART RATE: 81 BPM | RESPIRATION RATE: 15 BRPM | BODY MASS INDEX: 33.18 KG/M2 | WEIGHT: 169 LBS | HEIGHT: 60 IN | DIASTOLIC BLOOD PRESSURE: 76 MMHG | OXYGEN SATURATION: 96 % | SYSTOLIC BLOOD PRESSURE: 114 MMHG

## 2023-03-28 DIAGNOSIS — F41.9 ANXIETY: ICD-10-CM

## 2023-03-28 DIAGNOSIS — R23.2 HOT FLASHES: ICD-10-CM

## 2023-03-28 DIAGNOSIS — G89.29 CHRONIC MIDLINE LOW BACK PAIN WITH BILATERAL SCIATICA: Primary | ICD-10-CM

## 2023-03-28 DIAGNOSIS — M79.606 LOW BACK PAIN RADIATING TO LOWER EXTREMITY: ICD-10-CM

## 2023-03-28 DIAGNOSIS — E07.9 THYROID DISEASE: ICD-10-CM

## 2023-03-28 DIAGNOSIS — M54.41 CHRONIC MIDLINE LOW BACK PAIN WITH BILATERAL SCIATICA: Primary | ICD-10-CM

## 2023-03-28 DIAGNOSIS — M54.50 LOW BACK PAIN RADIATING TO LOWER EXTREMITY: ICD-10-CM
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
## 2023-03-28 DIAGNOSIS — F43.21 GRIEF: ICD-10-CM

## 2023-03-28 DIAGNOSIS — G47.00 INSOMNIA, UNSPECIFIED TYPE: ICD-10-CM

## 2023-03-28 DIAGNOSIS — M54.42 CHRONIC MIDLINE LOW BACK PAIN WITH BILATERAL SCIATICA: Primary | ICD-10-CM

## 2023-03-28 LAB
T4 FREE SERPL-MCNC: 0.95 NG/DL (ref 0.71–1.51)
TSH SERPL DL<=0.005 MIU/L-ACNC: 0.15 UIU/ML (ref 0.4–4)

## 2023-03-28 PROCEDURE — 99214 PR OFFICE/OUTPT VISIT, EST, LEVL IV, 30-39 MIN: ICD-10-PCS | Mod: S$PBB,,, | Performed by: STUDENT IN AN ORGANIZED HEALTH CARE EDUCATION/TRAINING PROGRAM

## 2023-03-28 PROCEDURE — 84439 ASSAY OF FREE THYROXINE: CPT

## 2023-03-28 PROCEDURE — 99999 PR PBB SHADOW E&M-EST. PATIENT-LVL III: CPT | Mod: PBBFAC,,, | Performed by: STUDENT IN AN ORGANIZED HEALTH CARE EDUCATION/TRAINING PROGRAM

## 2023-03-28 PROCEDURE — 36415 COLL VENOUS BLD VENIPUNCTURE: CPT

## 2023-03-28 PROCEDURE — 3078F DIAST BP <80 MM HG: CPT | Mod: CPTII,,, | Performed by: STUDENT IN AN ORGANIZED HEALTH CARE EDUCATION/TRAINING PROGRAM

## 2023-03-28 PROCEDURE — 99999 PR PBB SHADOW E&M-EST. PATIENT-LVL III: ICD-10-PCS | Mod: PBBFAC,,, | Performed by: STUDENT IN AN ORGANIZED HEALTH CARE EDUCATION/TRAINING PROGRAM

## 2023-03-28 PROCEDURE — 99214 OFFICE O/P EST MOD 30 MIN: CPT | Mod: S$PBB,,, | Performed by: STUDENT IN AN ORGANIZED HEALTH CARE EDUCATION/TRAINING PROGRAM

## 2023-03-28 PROCEDURE — 1160F RVW MEDS BY RX/DR IN RCRD: CPT | Mod: CPTII,,, | Performed by: STUDENT IN AN ORGANIZED HEALTH CARE EDUCATION/TRAINING PROGRAM

## 2023-03-28 PROCEDURE — 3074F SYST BP LT 130 MM HG: CPT | Mod: CPTII,,, | Performed by: STUDENT IN AN ORGANIZED HEALTH CARE EDUCATION/TRAINING PROGRAM

## 2023-03-28 PROCEDURE — 99213 OFFICE O/P EST LOW 20 MIN: CPT | Mod: PBBFAC,PN | Performed by: STUDENT IN AN ORGANIZED HEALTH CARE EDUCATION/TRAINING PROGRAM

## 2023-03-28 PROCEDURE — 1159F PR MEDICATION LIST DOCUMENTED IN MEDICAL RECORD: ICD-10-PCS | Mod: CPTII,,, | Performed by: STUDENT IN AN ORGANIZED HEALTH CARE EDUCATION/TRAINING PROGRAM

## 2023-03-28 PROCEDURE — 3074F PR MOST RECENT SYSTOLIC BLOOD PRESSURE < 130 MM HG: ICD-10-PCS | Mod: CPTII,,, | Performed by: STUDENT IN AN ORGANIZED HEALTH CARE EDUCATION/TRAINING PROGRAM

## 2023-03-28 PROCEDURE — 3008F BODY MASS INDEX DOCD: CPT | Mod: CPTII,,, | Performed by: STUDENT IN AN ORGANIZED HEALTH CARE EDUCATION/TRAINING PROGRAM

## 2023-03-28 PROCEDURE — 84443 ASSAY THYROID STIM HORMONE: CPT

## 2023-03-28 PROCEDURE — 1160F PR REVIEW ALL MEDS BY PRESCRIBER/CLIN PHARMACIST DOCUMENTED: ICD-10-PCS | Mod: CPTII,,, | Performed by: STUDENT IN AN ORGANIZED HEALTH CARE EDUCATION/TRAINING PROGRAM

## 2023-03-28 PROCEDURE — 3008F PR BODY MASS INDEX (BMI) DOCUMENTED: ICD-10-PCS | Mod: CPTII,,, | Performed by: STUDENT IN AN ORGANIZED HEALTH CARE EDUCATION/TRAINING PROGRAM

## 2023-03-28 PROCEDURE — 1159F MED LIST DOCD IN RCRD: CPT | Mod: CPTII,,, | Performed by: STUDENT IN AN ORGANIZED HEALTH CARE EDUCATION/TRAINING PROGRAM

## 2023-03-28 PROCEDURE — 3078F PR MOST RECENT DIASTOLIC BLOOD PRESSURE < 80 MM HG: ICD-10-PCS | Mod: CPTII,,, | Performed by: STUDENT IN AN ORGANIZED HEALTH CARE EDUCATION/TRAINING PROGRAM

## 2023-03-28 RX ORDER — HYDROCODONE BITARTRATE AND ACETAMINOPHEN 10; 325 MG/1; MG/1
1 TABLET ORAL EVERY 6 HOURS PRN
Qty: 10 TABLET | Refills: 0 | Status: SHIPPED | OUTPATIENT
Start: 2023-03-28 | End: 2023-04-19

## 2023-03-28 RX ORDER — LEVOTHYROXINE SODIUM 100 UG/1
100 TABLET ORAL
Qty: 90 TABLET | Refills: 0 | Status: SHIPPED | OUTPATIENT
Start: 2023-03-28 | End: 2023-07-17

## 2023-03-28 RX ORDER — ALPRAZOLAM 0.5 MG/1
0.5 TABLET ORAL 3 TIMES DAILY PRN
Refills: 0
Start: 2023-03-28 | End: 2023-04-05 | Stop reason: SDUPTHER
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
## 2023-04-05 ENCOUNTER — TELEPHONE (OUTPATIENT)
Dept: FAMILY MEDICINE | Facility: CLINIC | Age: 38
End: 2023-04-05
Payer: MEDICAID

## 2023-04-05 DIAGNOSIS — F41.9 ANXIETY: ICD-10-CM

## 2023-04-05 DIAGNOSIS — F43.21 GRIEF: ICD-10-CM

## 2023-04-05 DIAGNOSIS — G47.00 INSOMNIA, UNSPECIFIED TYPE: ICD-10-CM

## 2023-04-05 NOTE — TELEPHONE ENCOUNTER
Attempted to contact Corina Liu to discuss  medication .    Left voice mail to return our call at [phone] on [phone] (home).    Tiny Schulte LPN

## 2023-04-05 NOTE — TELEPHONE ENCOUNTER
----- Message from Bev Ruiz sent at 4/5/2023  2:12 PM CDT -----  Contact: SEUN GUTIERRES [phone]    Type: Needs Medical Advice      Who Called:  SEUN GUTIERRES    Best Call Back Number: [phone] (home)     Additional Information: Pt is calling requesting to speak with nurse regarding Rx refill Xanax.   Please call back and advise. Thanks     NOTE LISTED UNDER MEDS: Found prior authorization for another prescription for the same medication: Canceled - Other (PA not required )

## 2023-04-18 ENCOUNTER — TELEPHONE (OUTPATIENT)
Dept: OTOLARYNGOLOGY | Facility: CLINIC | Age: 38
End: 2023-04-18
Payer: MEDICAID

## 2023-04-18 DIAGNOSIS — M54.50 LOW BACK PAIN RADIATING TO LOWER EXTREMITY: ICD-10-CM

## 2023-04-18 DIAGNOSIS — G47.00 INSOMNIA, UNSPECIFIED TYPE: ICD-10-CM

## 2023-04-18 DIAGNOSIS — M79.606 LOW BACK PAIN RADIATING TO LOWER EXTREMITY: ICD-10-CM
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
## 2023-04-18 DIAGNOSIS — F43.21 GRIEF: ICD-10-CM

## 2023-04-18 DIAGNOSIS — F41.9 ANXIETY: ICD-10-CM

## 2023-04-18 NOTE — TELEPHONE ENCOUNTER
Can we work her into my schedule asap tomorrow or the next day virtually to discuss prior to fill? I do have a double book spot at 9am we can put her in if need be but would need to be virtual.

## 2023-04-19 ENCOUNTER — OFFICE VISIT (OUTPATIENT)
Dept: FAMILY MEDICINE | Facility: CLINIC | Age: 38
End: 2023-04-19
Payer: MEDICAID

## 2023-04-19 ENCOUNTER — OFFICE VISIT (OUTPATIENT)
Dept: OTOLARYNGOLOGY | Facility: CLINIC | Age: 38
End: 2023-04-19
Payer: MEDICAID

## 2023-04-19 VITALS
BODY MASS INDEX: 32.26 KG/M2 | SYSTOLIC BLOOD PRESSURE: 133 MMHG | DIASTOLIC BLOOD PRESSURE: 94 MMHG | WEIGHT: 164.31 LBS | HEIGHT: 60 IN

## 2023-04-19 DIAGNOSIS — M79.606 LOW BACK PAIN RADIATING TO LOWER EXTREMITY: ICD-10-CM

## 2023-04-19 DIAGNOSIS — G89.29 CHRONIC MIDLINE LOW BACK PAIN WITH BILATERAL SCIATICA: Primary | ICD-10-CM

## 2023-04-19 DIAGNOSIS — R49.0 HOARSENESS: Primary | ICD-10-CM

## 2023-04-19 DIAGNOSIS — M54.41 CHRONIC MIDLINE LOW BACK PAIN WITH BILATERAL SCIATICA: Primary | ICD-10-CM

## 2023-04-19 DIAGNOSIS — G47.00 INSOMNIA, UNSPECIFIED TYPE: ICD-10-CM

## 2023-04-19 DIAGNOSIS — M54.42 CHRONIC MIDLINE LOW BACK PAIN WITH BILATERAL SCIATICA: Primary | ICD-10-CM

## 2023-04-19 DIAGNOSIS — F41.9 ANXIETY: ICD-10-CM

## 2023-04-19 DIAGNOSIS — F43.21 GRIEF: ICD-10-CM

## 2023-04-19 DIAGNOSIS — H69.91 EUSTACHIAN TUBE DYSFUNCTION, RIGHT: ICD-10-CM

## 2023-04-19 DIAGNOSIS — M54.50 LOW BACK PAIN RADIATING TO LOWER EXTREMITY: ICD-10-CM

## 2023-04-19 PROCEDURE — 3075F SYST BP GE 130 - 139MM HG: CPT | Mod: CPTII,,, | Performed by: OTOLARYNGOLOGY

## 2023-04-19 PROCEDURE — 3080F PR MOST RECENT DIASTOLIC BLOOD PRESSURE >= 90 MM HG: ICD-10-PCS | Mod: CPTII,,, | Performed by: OTOLARYNGOLOGY

## 2023-04-19 PROCEDURE — 31575 DIAGNOSTIC LARYNGOSCOPY: CPT | Mod: S$PBB,,, | Performed by: OTOLARYNGOLOGY

## 2023-04-19 PROCEDURE — 99406 PR TOBACCO USE CESSATION INTERMEDIATE 3-10 MINUTES: ICD-10-PCS | Mod: S$PBB,,, | Performed by: OTOLARYNGOLOGY

## 2023-04-19 PROCEDURE — 3080F DIAST BP >= 90 MM HG: CPT | Mod: CPTII,,, | Performed by: OTOLARYNGOLOGY

## 2023-04-19 PROCEDURE — 1160F PR REVIEW ALL MEDS BY PRESCRIBER/CLIN PHARMACIST DOCUMENTED: ICD-10-PCS | Mod: CPTII,GT,, | Performed by: FAMILY MEDICINE

## 2023-04-19 PROCEDURE — 99203 OFFICE O/P NEW LOW 30 MIN: CPT | Mod: S$PBB,25,, | Performed by: OTOLARYNGOLOGY

## 2023-04-19 PROCEDURE — 99214 OFFICE O/P EST MOD 30 MIN: CPT | Mod: GT,,, | Performed by: FAMILY MEDICINE

## 2023-04-19 PROCEDURE — 99999 PR PBB SHADOW E&M-EST. PATIENT-LVL III: CPT | Mod: PBBFAC,,, | Performed by: OTOLARYNGOLOGY

## 2023-04-19 PROCEDURE — 1159F PR MEDICATION LIST DOCUMENTED IN MEDICAL RECORD: ICD-10-PCS | Mod: CPTII,,, | Performed by: OTOLARYNGOLOGY

## 2023-04-19 PROCEDURE — 99213 OFFICE O/P EST LOW 20 MIN: CPT | Mod: PBBFAC,PN,25 | Performed by: OTOLARYNGOLOGY

## 2023-04-19 PROCEDURE — 3008F BODY MASS INDEX DOCD: CPT | Mod: CPTII,,, | Performed by: OTOLARYNGOLOGY

## 2023-04-19 PROCEDURE — 31575 PR LARYNGOSCOPY, FLEXIBLE; DIAGNOSTIC: ICD-10-PCS | Mod: S$PBB,,, | Performed by: OTOLARYNGOLOGY

## 2023-04-19 PROCEDURE — 1159F PR MEDICATION LIST DOCUMENTED IN MEDICAL RECORD: ICD-10-PCS | Mod: CPTII,GT,, | Performed by: FAMILY MEDICINE

## 2023-04-19 PROCEDURE — 3008F PR BODY MASS INDEX (BMI) DOCUMENTED: ICD-10-PCS | Mod: CPTII,,, | Performed by: OTOLARYNGOLOGY

## 2023-04-19 PROCEDURE — 99999 PR PBB SHADOW E&M-EST. PATIENT-LVL III: ICD-10-PCS | Mod: PBBFAC,,, | Performed by: OTOLARYNGOLOGY

## 2023-04-19 PROCEDURE — 99406 BEHAV CHNG SMOKING 3-10 MIN: CPT | Mod: S$PBB,,, | Performed by: OTOLARYNGOLOGY

## 2023-04-19 PROCEDURE — 1159F MED LIST DOCD IN RCRD: CPT | Mod: CPTII,GT,, | Performed by: FAMILY MEDICINE

## 2023-04-19 PROCEDURE — 3075F PR MOST RECENT SYSTOLIC BLOOD PRESS GE 130-139MM HG: ICD-10-PCS | Mod: CPTII,,, | Performed by: OTOLARYNGOLOGY

## 2023-04-19 PROCEDURE — 1160F PR REVIEW ALL MEDS BY PRESCRIBER/CLIN PHARMACIST DOCUMENTED: ICD-10-PCS | Mod: CPTII,,, | Performed by: OTOLARYNGOLOGY

## 2023-04-19 PROCEDURE — 1160F RVW MEDS BY RX/DR IN RCRD: CPT | Mod: CPTII,,, | Performed by: OTOLARYNGOLOGY

## 2023-04-19 PROCEDURE — 31575 DIAGNOSTIC LARYNGOSCOPY: CPT | Mod: PBBFAC,PN | Performed by: OTOLARYNGOLOGY

## 2023-04-19 PROCEDURE — 1160F RVW MEDS BY RX/DR IN RCRD: CPT | Mod: CPTII,GT,, | Performed by: FAMILY MEDICINE

## 2023-04-19 PROCEDURE — 87070 CULTURE OTHR SPECIMN AEROBIC: CPT | Performed by: OTOLARYNGOLOGY

## 2023-04-19 PROCEDURE — 1159F MED LIST DOCD IN RCRD: CPT | Mod: CPTII,,, | Performed by: OTOLARYNGOLOGY

## 2023-04-19 PROCEDURE — 99214 PR OFFICE/OUTPT VISIT, EST, LEVL IV, 30-39 MIN: ICD-10-PCS | Mod: GT,,, | Performed by: FAMILY MEDICINE

## 2023-04-19 PROCEDURE — 99203 PR OFFICE/OUTPT VISIT, NEW, LEVL III, 30-44 MIN: ICD-10-PCS | Mod: S$PBB,25,, | Performed by: OTOLARYNGOLOGY

## 2023-04-19 RX ORDER — ALPRAZOLAM 0.5 MG/1
0.5 TABLET ORAL 3 TIMES DAILY PRN
Qty: 30 TABLET | Refills: 0 | Status: SHIPPED | OUTPATIENT
Start: 2023-04-19 | End: 2023-05-04 | Stop reason: SDUPTHER

## 2023-04-19 RX ORDER — SULFAMETHOXAZOLE AND TRIMETHOPRIM 800; 160 MG/1; MG/1
1 TABLET ORAL 2 TIMES DAILY
Qty: 28 TABLET | Refills: 0 | Status: SHIPPED | OUTPATIENT
Start: 2023-04-19 | End: 2023-05-03

## 2023-04-19 RX ORDER — AZELASTINE 1 MG/ML
SPRAY, METERED NASAL
Qty: 30 ML | Refills: 10 | Status: SHIPPED | OUTPATIENT
Start: 2023-04-19 | End: 2023-06-26

## 2023-04-19 RX ORDER — OMEPRAZOLE 40 MG/1
40 CAPSULE, DELAYED RELEASE ORAL
Qty: 60 CAPSULE | Refills: 11 | Status: SHIPPED | OUTPATIENT
Start: 2023-04-19 | End: 2024-04-18

## 2023-04-19 RX ORDER — OXYCODONE AND ACETAMINOPHEN 10; 325 MG/1; MG/1
1 TABLET ORAL EVERY 8 HOURS PRN
Qty: 15 TABLET | Refills: 0 | Status: SHIPPED | OUTPATIENT
Start: 2023-04-19 | End: 2023-05-04 | Stop reason: SDUPTHER

## 2023-04-19 RX ORDER — ALPRAZOLAM 0.5 MG/1
0.5 TABLET ORAL 3 TIMES DAILY PRN
Qty: 30 TABLET | Refills: 0 | OUTPATIENT
Start: 2023-04-19 | End: 2023-04-29

## 2023-04-19 RX ORDER — HYDROCODONE BITARTRATE AND ACETAMINOPHEN 10; 325 MG/1; MG/1
1 TABLET ORAL EVERY 6 HOURS PRN
Qty: 10 TABLET | Refills: 0 | OUTPATIENT
Start: 2023-04-19
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
## 2023-04-19 NOTE — PROGRESS NOTES
Subjective:       Patient ID: Corina Liu is a 37 y.o. female.    Chief Complaint: Otitis Media (Pt c/o recurrent ear infection. Pt feels popping in ear that travel to her chest for a year. )      7-year-old presents with a couple of issues one is that her right ear is having some problems that are not typical for her and that her voice especially her singing voice has been impaired for months.  She has a notable history of recent tragedies in her life including murder of her  in February loss of her father January.  She smokes at 1.2 packs a day she is down to a pack and a half now.    As she describes the problem with her right ear she tells me that it feels stuffy she will pinch and pop it and she hears a squealing noise it is not normal sometimes others can hear it if there near her when she performs this maneuver.  She is tried Flonase and it has not been helpful.  She does have some midfacial pressure.          Objective:      ENT Physical Exam  Constitutional  Appearance: patient appears well-developed, well-nourished and well-groomed,  Communication/Voice: communication appropriate for developmental age; vocal quality normal;  Head and Face  Appearance: head appears normal, face appears normal and face appears atraumatic;  Salivary: glands normal;  Ear  Hearing: intact;  Auricles: right auricle normal; left auricle normal;  Ear Canals: right ear canal normal; left ear canal normal;  Tympanic Membranes: left tympanic membrane normal;  Ear comments: So her eardrum looks fine but I had her do the Valsalva as I watched and it did make a squealing noise has air passed through the eustachian tube but there is no obvious perforation either on physical exam or after she insufflate the middle ear space.  That is no redness or evidence of an effusion.  Nose  External Nose: nares patent bilaterally; external nose normal;  Internal Nose: nasal mucosa normal; septum normal; bilateral inferior turbinates  normal;  Oral Cavity/Oropharynx  Lips: normal;  Teeth: normal;  Gums: gingiva normal;  Tongue: normal;  Oral mucosa: normal;  Hard palate: normal;  Soft palate: normal;  Tonsils: normal;  Base of Tongue: normal;  Posterior pharyngeal wall: normal;  Nasopharynx/Hypopharynx/Larynx  NP/HP/Lx comments: We discussed the benefits of a fiberoptic exam that include a better view of the larynx a better view of the anterior aspect of the larynx a more magnified view of the larynx hypopharynx and nasopharynx in the context of the patient's particular complaint and the patient wishes to proceed with this minor examination.    Afrin and lidocaine were atomized into the nasal airway 5 or so minutes were given for the decongestant and anesthetic to take effect and then the flexible fiberoptic laryngoscope was passed through the nasal cavity and the exam proceeded.     In the interest of evaluating her vocal cords for the hoarseness and especially given her history of thyroid cancer.      Her right side was decongested and the scope was passed on that side.  A good view of her eustachian tube orifice was obtained and there is no lesions or tumors although there is general redness but she smokes enough to explain that.  Her nasopharynx is generally red she has some patchy residual adenoid tissue.  There is some white inspissated mucus in the fossa bilaterally but not chyna purulence and while there has a lot of redness one might tend to attribute that to her smoking although there could also be something inflammatory or infectious going on.      The scope passed easily into her larynx both cords move normally and symmetrically there is once again generalized redness throughout her airway but there is focal redness of the medial arytenoid on the left in particular with a near ulceration of the medial arytenoid on that is side and redness of the medial arytenoid on the right side that is more prominent than the generalized redness  that is likely attributable to her smoking history.  Neck  Neck: neck normal; neck palpation normal;  Thyroid: thyroid normal;  Lymphatic  Palpation: lymph nodes normal;        Assessment:       1. Hoarseness    2. Eustachian tube dysfunction, right         Plan:          So I wonder if she has some infectious process going on in her nasopharynx and was going to treat with some Bactrim I obtained a culture and will see if that grows anything    Additionally she has evidence of reflux laryngitis although a good bit of her hoarseness maybe from the general inflammation from smoking and I spoke to her at length about smoking cessation we went over the options and for her particular issue I think she would be most suited to converting over to vaporizing given her stressful recent issues with family her attempts to quit another [employer] and in the interest of reducing inflammation and potentially reducing cancer risk I have discussed vaporizing for her as the smoking cessation method that is not nearly as good for her health is completely stopping but better than smoking especially as far as the ear nose and throat issues that we are addressing are concerned.    I want to see her back in six week both to see how her ear is doing and to see how her voice is doing after b.i.d. PPIs for six weeks

## 2023-04-19 NOTE — TELEPHONE ENCOUNTER
----- Message from Bev Ruiz sent at 4/19/2023 11:29 AM CDT -----  [phone]  Type:  Pharmacy Calling to Clarify an RX      Name of Caller:  [facility]    Pharmacy Name:    [facility] [place] [place] - [address]  [address]  [place] [address]  Phone: [phone] Fax: [phone]    Prescription Name:    1. ALPRAZolam (XANAX) 0.5 MG tablet  2. Lisdexamfetamine (VYVANSE) 60 MG capsule  3. oxyCODONE-acetaminophen (PERCOCET)  mg per tablet    What do they need to clarify?:  RX Interact     Best Call Back Number:  [phone]    Additional Information:  Pharmacy called to speak with nurse regarding Rx   1. ALPRAZolam (XANAX) 0.5 MG tablet  2. Lisdexamfetamine (VYVANSE) 60 MG capsule  3. oxyCODONE-acetaminophen (PERCOCET)  mg per tablet  Pharmacy advised that prescription interact with one another. Want to confirm approval on Rx refill.  Please call back and advise. Thanks

## 2023-04-19 NOTE — TELEPHONE ENCOUNTER
Yuliya Ortega with pharmacy would like ok to Fill RX 1. ALPRAZolam (XANAX) 0.5 MG tablet   2. Lisdexamfetamine (VYVANSE) 60 MG capsule   3. oxyCODONE-acetaminophen (PERCOCET)  mg per tablet   Pharmacy advised that prescription interact with one another. Want to confirm approval on Rx refil

## 2023-04-19 NOTE — PROGRESS NOTES
The patient location is: [place]  The chief complaint leading to consultation is: anxiety    Visit type: audiovisual    Face to Face time with patient: 8 minutes  10 minutes of total time spent on the encounter, which includes face to face time and non-face to face time preparing to see the patient (eg, review of tests), Obtaining and/or reviewing separately obtained history, Documenting clinical information in the electronic or other health record, Independently interpreting results (not separately reported) and communicating results to the patient/family/caregiver, or Care coordination (not separately reported).         Each patient to whom he or she provides medical services by telemedicine is:  (1) informed of the relationship between the physician and patient and the respective role of any other health care provider with respect to management of the patient; and (2) notified that he or she may decline to receive medical services by telemedicine and may withdraw from such care at any time.    Notes:  Subjective:       Patient ID: Corina Liu is a 37 y.o. female.    Chief Complaint: No chief complaint on file.      Ms Liu is following up of chronic worsening anxiety in the setting of grief and traumatic event with her 's death. She saw Dr. Chapman while I was out.   She has been using her xanax and making last 10 days.      When she last saw Dr. Chapman she noted that she was having exacerbation of back pain related to a recent move and worsening anxiety related to the move and change in sleeping arrangements.     She has not been taking her xanax during the day.  She has been using one when she gets home in the evening, one at bedtime for anxiety but without her child close to her she is having wake ups with severe panic/anxiety in the night.  She is finding herself needing additional dose in the night and wanted to ask if she could get this third dose instead of self increasing.    She  identifies her past history of medication dependence and did not want this to be a long term situation  She would like to reassess every 10 days which is why we are having this virtual visit today.     Back pain - reports worsening flare in low back.      Anxiety- now reports- things have been hitting her hard. She has been tearful. She is getting an organization up and running.     Review of Systems   Constitutional:  Positive for activity change. Negative for unexpected weight change.   HENT:  Positive for rhinorrhea. Negative for hearing loss and trouble swallowing.    Eyes:  Positive for discharge. Negative for visual disturbance.   Respiratory:  Positive for chest tightness. Negative for wheezing.    Cardiovascular:  Positive for palpitations. Negative for chest pain.   Gastrointestinal:  Negative for blood in stool, constipation, diarrhea and vomiting.   Endocrine: Positive for polydipsia. Negative for polyuria.   Genitourinary:  Negative for difficulty urinating, dysuria, hematuria and menstrual problem.   Musculoskeletal:  Positive for arthralgias, joint swelling and neck pain.   Neurological:  Positive for headaches. Negative for weakness.   Psychiatric/Behavioral:  Negative for confusion and dysphoric mood.        Objective:      Physical Exam  Constitutional:       General: She is not in acute distress.     Appearance: Normal appearance. She is not ill-appearing.   Pulmonary:      Effort: Pulmonary effort is normal. No respiratory distress.   Neurological:      Mental Status: She is alert.   Psychiatric:         Mood and Affect: Mood is anxious. Affect is tearful.         Behavior: Behavior normal.         Thought Content: Thought content normal.         Judgment: Judgment normal.       Assessment:       1. Chronic midline low back pain with bilateral sciatica    2. Low back pain radiating to lower extremity    3. Grief    4. Insomnia, unspecified type    5. Anxiety        Plan:       Problem List Items  Addressed This Visit    None  Visit Diagnoses       Chronic midline low back pain with bilateral sciatica    -  Primary    Relevant Medications    oxyCODONE-acetaminophen (PERCOCET)  mg per tablet    Low back pain radiating to lower extremity        Relevant Medications    oxyCODONE-acetaminophen (PERCOCET)  mg per tablet    Grief        Relevant Medications    ALPRAZolam (XANAX) 0.5 MG tablet    Insomnia, unspecified type        Relevant Medications    ALPRAZolam (XANAX) 0.5 MG tablet    Anxiety        Relevant Medications    ALPRAZolam (XANAX) 0.5 MG tablet

## 2023-04-22 LAB — BACTERIA SPEC AEROBE CULT: NORMAL

## 2023-04-24 ENCOUNTER — TELEPHONE (OUTPATIENT)
Dept: OTOLARYNGOLOGY | Facility: CLINIC | Age: 38
End: 2023-04-24
Payer: MEDICAID

## 2023-04-24 NOTE — TELEPHONE ENCOUNTER
----- Message from Preet Reeves MD sent at 4/24/2023 10:24 AM CDT -----  Tell her that the culture did not grow out anything yet but just to continue on the antibiotics that we sent in unless there some problem with them

## 2023-04-24 NOTE — PROGRESS NOTES
Tell her that the culture did not grow out anything yet but just to continue on the antibiotics that we sent in unless there some problem with them

## 2023-04-26 ENCOUNTER — HOSPITAL ENCOUNTER (EMERGENCY)
Facility: HOSPITAL | Age: 38
Discharge: HOME | End: 2023-04-26
Attending: EMERGENCY MEDICINE
Payer: MEDICAID

## 2023-04-26 VITALS
DIASTOLIC BLOOD PRESSURE: 72 MMHG | BODY MASS INDEX: 32.98 KG/M2 | TEMPERATURE: 99 F | HEART RATE: 77 BPM | HEIGHT: 60 IN | SYSTOLIC BLOOD PRESSURE: 118 MMHG | RESPIRATION RATE: 18 BRPM | OXYGEN SATURATION: 96 % | WEIGHT: 168 LBS

## 2023-04-26 DIAGNOSIS — N93.9 VAGINAL BLEEDING: Primary | ICD-10-CM

## 2023-04-26 LAB
ALBUMIN SERPL BCP-MCNC: 4 G/DL (ref 3.5–5.2)
ALP SERPL-CCNC: 47 U/L (ref 55–135)
ALT SERPL W/O P-5'-P-CCNC: 19 U/L (ref 10–44)
ANION GAP SERPL CALC-SCNC: 7 MMOL/L (ref 8–16)
AST SERPL-CCNC: 25 U/L (ref 10–40)
B-HCG UR QL: NEGATIVE
BACTERIA GENITAL QL WET PREP: ABNORMAL
BASOPHILS # BLD AUTO: 0.06 K/UL (ref 0–0.2)
BASOPHILS NFR BLD: 0.7 % (ref 0–1.9)
BILIRUB SERPL-MCNC: 0.6 MG/DL (ref 0.1–1)
BILIRUB UR QL STRIP: NEGATIVE
BUN SERPL-MCNC: 14 MG/DL (ref 6–20)
CALCIUM SERPL-MCNC: 9.3 MG/DL (ref 8.7–10.5)
CHLORIDE SERPL-SCNC: 106 MMOL/L (ref 95–110)
CLARITY UR: CLEAR
CLUE CELLS VAG QL WET PREP: ABNORMAL
CO2 SERPL-SCNC: 25 MMOL/L (ref 23–29)
COLOR UR: YELLOW
CREAT SERPL-MCNC: 0.9 MG/DL (ref 0.5–1.4)
DIFFERENTIAL METHOD: ABNORMAL
EOSINOPHIL # BLD AUTO: 0.1 K/UL (ref 0–0.5)
EOSINOPHIL NFR BLD: 1.1 % (ref 0–8)
ERYTHROCYTE [DISTWIDTH] IN BLOOD BY AUTOMATED COUNT: 12.4 % (ref 11.5–14.5)
EST. GFR  (NO RACE VARIABLE): >60 ML/MIN/1.73 M^2
FILAMENT FUNGI VAG WET PREP-#/AREA: ABNORMAL
GLUCOSE SERPL-MCNC: 112 MG/DL (ref 70–110)
GLUCOSE UR QL STRIP: NEGATIVE
HCT VFR BLD AUTO: 39.7 % (ref 37–48.5)
HGB BLD-MCNC: 13.4 G/DL (ref 12–16)
HGB UR QL STRIP: ABNORMAL
IMM GRANULOCYTES # BLD AUTO: 0.02 K/UL (ref 0–0.04)
IMM GRANULOCYTES NFR BLD AUTO: 0.2 % (ref 0–0.5)
KETONES UR QL STRIP: NEGATIVE
LEUKOCYTE ESTERASE UR QL STRIP: NEGATIVE
LIPASE SERPL-CCNC: 19 U/L (ref 4–60)
LYMPHOCYTES # BLD AUTO: 2.4 K/UL (ref 1–4.8)
LYMPHOCYTES NFR BLD: 28.3 % (ref 18–48)
MCH RBC QN AUTO: 31.3 PG (ref 27–31)
MCHC RBC AUTO-ENTMCNC: 33.8 G/DL (ref 32–36)
MCV RBC AUTO: 93 FL (ref 82–98)
MONOCYTES # BLD AUTO: 0.6 K/UL (ref 0.3–1)
MONOCYTES NFR BLD: 6.6 % (ref 4–15)
NEUTROPHILS # BLD AUTO: 5.3 K/UL (ref 1.8–7.7)
NEUTROPHILS NFR BLD: 63.1 % (ref 38–73)
NITRITE UR QL STRIP: NEGATIVE
NRBC BLD-RTO: 0 /100 WBC
PH UR STRIP: 8 [PH] (ref 5–8)
PLATELET # BLD AUTO: 266 K/UL (ref 150–450)
PMV BLD AUTO: 10.8 FL (ref 9.2–12.9)
POTASSIUM SERPL-SCNC: 4.6 MMOL/L (ref 3.5–5.1)
PROT SERPL-MCNC: 6.6 G/DL (ref 6–8.4)
PROT UR QL STRIP: NEGATIVE
RBC # BLD AUTO: 4.28 M/UL (ref 4–5.4)
SODIUM SERPL-SCNC: 138 MMOL/L (ref 136–145)
SP GR UR STRIP: 1.02 (ref 1–1.03)
SPECIMEN SOURCE: ABNORMAL
T VAGINALIS GENITAL QL WET PREP: ABNORMAL
URN SPEC COLLECT METH UR: ABNORMAL
UROBILINOGEN UR STRIP-ACNC: NEGATIVE EU/DL
WBC # BLD AUTO: 8.45 K/UL (ref 3.9–12.7)
WBC #/AREA VAG WET PREP: ABNORMAL
YEAST GENITAL QL WET PREP: ABNORMAL

## 2023-04-26 PROCEDURE — 80053 COMPREHEN METABOLIC PANEL: CPT | Performed by: NURSE PRACTITIONER

## 2023-04-26 PROCEDURE — 87591 N.GONORRHOEAE DNA AMP PROB: CPT | Performed by: NURSE PRACTITIONER

## 2023-04-26 PROCEDURE — 83690 ASSAY OF LIPASE: CPT | Performed by: NURSE PRACTITIONER

## 2023-04-26 PROCEDURE — 99284 EMERGENCY DEPT VISIT MOD MDM: CPT

## 2023-04-26 PROCEDURE — 96360 HYDRATION IV INFUSION INIT: CPT

## 2023-04-26 PROCEDURE — 81025 URINE PREGNANCY TEST: CPT | Performed by: PHYSICIAN ASSISTANT

## 2023-04-26 PROCEDURE — 85025 COMPLETE CBC W/AUTO DIFF WBC: CPT | Performed by: NURSE PRACTITIONER

## 2023-04-26 PROCEDURE — 81003 URINALYSIS AUTO W/O SCOPE: CPT | Performed by: PHYSICIAN ASSISTANT

## 2023-04-26 PROCEDURE — 36415 COLL VENOUS BLD VENIPUNCTURE: CPT | Performed by: NURSE PRACTITIONER

## 2023-04-26 PROCEDURE — 25000003 PHARM REV CODE 250: Performed by: NURSE PRACTITIONER

## 2023-04-26 PROCEDURE — 87210 SMEAR WET MOUNT SALINE/INK: CPT | Performed by: NURSE PRACTITIONER

## 2023-04-26 RX ORDER — ONDANSETRON 4 MG/1
4 TABLET, ORALLY DISINTEGRATING ORAL
Status: COMPLETED | OUTPATIENT
Start: 2023-04-26 | End: 2023-04-26

## 2023-04-26 RX ADMIN — ONDANSETRON 4 MG: 4 TABLET, ORALLY DISINTEGRATING ORAL at 01:04

## 2023-04-26 RX ADMIN — SODIUM CHLORIDE 1000 ML: 9 INJECTION, SOLUTION INTRAVENOUS at 01:04

## 2023-04-26 NOTE — ED PROVIDER NOTES
Encounter Date: 2023       History     Chief Complaint   Patient presents with    Vaginal Bleeding     Vaginal bleeding since Saturday.  Pt had a hysterectomy . Bleeding increased this morning.       Patient is a 37 y.o. female who presents to the ED 2023 with a chief complaint of light vaginal bleeding for several days.  She states it only occurs in the morning and then resolves.  She denies any having bleeding or clots.  She states she went to the hospital 2 days ago for the same thing and was told she was not pregnant and had a CT scan at this time.  She has a history of hysterectomy and she has only her right ovary remaining.  Patient also has a history of thyroid cancer.  She is not currently undergoing any treatment.  She states she does have nausea without vomiting and she denies any fever or abdominal or pelvic pain.  She denies any vaginal discharge.  She is a history of IV drug abuse but states she is been sober for several years.  Past medical and surgical history noted below.            Review of patient's allergies indicates:   Allergen Reactions    Levaquin [levofloxacin] Swelling and Rash     Joint pain, abdominal pain     Past Medical History:   Diagnosis Date    Cancer     Thyroid    Gastro-esophageal reflux     Hepatitis C 2016    antibody positive only    History of PCOS     Hypoglycemia     Hypothyroidism (acquired)     IV drug user 3/10/2019    Myocardial infarction     PTSD (post-traumatic stress disorder)     Pyelonephritis 3/10/2019    Sepsis 3/10/2019    Tachycardia     Thyroid disease     hypothyroid, pappilary carcinoma     Past Surgical History:   Procedure Laterality Date     SECTION      x3    HYSTERECTOMY       partical     lymthnodes      from the right arm     SHOULDER SURGERY Right 2022    thyroid nodule biopsy      THYROIDECTOMY      10/17/13    TONSILLECTOMY, ADENOIDECTOMY      TUBAL LIGATION       Family History   Problem Relation Age of Onset     Breast cancer Maternal Grandmother     Ovarian cancer Maternal Grandmother     Cataracts Maternal Grandmother     Cancer Mother         VULVAR AND CERVICAL    Ovarian cancer Mother     Colon cancer Neg Hx      Social History     Tobacco Use    Smoking status: Every Day     Packs/day: 2.00     Years: 21.00     Pack years: 42.00     Types: Cigarettes    Smokeless tobacco: Never   Substance Use Topics    Alcohol use: Yes     Alcohol/week: 0.0 standard drinks     Comment: socialy     Drug use: No     Comment: Prescription Methadone     Review of Systems   Constitutional:  Negative for chills and fever.   HENT:  Negative for sore throat.    Respiratory:  Negative for chest tightness and shortness of breath.    Cardiovascular:  Negative for chest pain.   Gastrointestinal:  Negative for abdominal pain, constipation, diarrhea, nausea and vomiting.   Genitourinary:  Positive for vaginal bleeding. Negative for difficulty urinating, dysuria, flank pain, vaginal discharge and vaginal pain.   Musculoskeletal:  Negative for arthralgias and myalgias.   Skin:  Negative for rash and wound.   Neurological:  Negative for syncope.   Hematological:  Does not bruise/bleed easily.     Physical Exam     Initial Vitals [04/26/23 1145]   BP Pulse Resp Temp SpO2   118/72 109 18 98.5 °F (36.9 °C) 97 %      MAP       --         Physical Exam    Nursing note and vitals reviewed.  Constitutional: Vital signs are normal. She appears well-developed and well-nourished.   HENT:   Head: Normocephalic and atraumatic.   Eyes: Pupils are equal, round, and reactive to light.   Neck: Neck supple.   Cardiovascular:  Normal rate, regular rhythm, normal heart sounds and intact distal pulses.     Exam reveals no gallop and no friction rub.       No murmur heard.  Pulmonary/Chest: Breath sounds normal. She has no wheezes. She has no rhonchi. She has no rales.   Abdominal: Abdomen is soft. Bowel sounds are normal. There is no abdominal tenderness. Hernia  confirmed negative in the right inguinal area and confirmed negative in the left inguinal area.   Genitourinary:    Uterus normal.      Pelvic exam was performed with patient supine.   There is no rash, tenderness or lesion on the right labia. There is no rash, tenderness or lesion on the left labia. Right adnexum displays no mass, no tenderness and no fullness.    Vaginal bleeding present.      No vaginal discharge, erythema or tenderness.   There is bleeding in the vagina. No erythema or tenderness in the vagina.    No foreign body in the vagina.      There are signs of injury in the vagina.      Genitourinary Comments: Vaginal vault with small area of excoriation <0.25 cm with bright red blood overlying without brisk bleeding and no blood in vaginal vault or clots. No visualized cervix.      Musculoskeletal:      Cervical back: Neck supple.     Lymphadenopathy: No inguinal adenopathy noted on the right or left side.   Neurological: She is alert and oriented to person, place, and time. She has normal strength.   Skin: Skin is warm, dry and intact.   Psychiatric: She has a normal mood and affect. Her speech is normal and behavior is normal.       ED Course   Procedures  Labs Reviewed   VAGINAL SCREEN - Abnormal; Notable for the following components:       Result Value    WBC - Vaginal Screen Rare (*)     All other components within normal limits    Narrative:     Release to patient->Immediate   URINALYSIS, REFLEX TO URINE CULTURE - Abnormal; Notable for the following components:    Occult Blood UA Trace (*)     All other components within normal limits    Narrative:     Specimen Source->Urine   CBC W/ AUTO DIFFERENTIAL - Abnormal; Notable for the following components:    MCH 31.3 (*)     All other components within normal limits   COMPREHENSIVE METABOLIC PANEL - Abnormal; Notable for the following components:    Glucose 112 (*)     Alkaline Phosphatase 47 (*)     Anion Gap 7 (*)     All other components within  normal limits   C. TRACHOMATIS/N. GONORRHOEAE BY AMP DNA   PREGNANCY TEST, URINE RAPID    Narrative:     Specimen Source->Urine   LIPASE          Imaging Results    None          Medications   sodium chloride 0.9% bolus 1,000 mL 1,000 mL (0 mLs Intravenous Stopped 4/26/23 1418)   ondansetron disintegrating tablet 4 mg (4 mg Oral Given 4/26/23 1312)     Medical Decision Making:   Differential Diagnosis:   Ectopic pregnancy  Anemia  Menorrhagia      APC / Resident Notes:   Patient is a 37 y.o. female who presents to the ED 04/26/2023 who underwent emergent evaluation for several days of light vaginal bleeding.  Vital signs normal.  No life-threatening anemia and hemoglobin hematocrit are normal.  She has a benign abdominal exam with absolutely no abdominal tenderness, distention, or guarding.  CT of abdomen pelvis from 2 days ago is reviewed.  I do not think repeat imaging indicated at this time as he is not suspect any emergent intra-abdominal process such as torsion, appendicitis, perforation.  Patient has a history of hysterectomy.  She has no cervix.  No evidence of any acute infectious process such as PID.  She is a small area of excoriation in her vaginal vault noted.  No brisk bleeding.  No clots or other blood noted in the vaginal vault.  Given history of cancer recommend outpatient close follow-up with gynecologist which patient currently has scheduled appointment for.  Pregnancy test negative.  I do not think ectopic pregnancy.  Patient also again has history of hysterectomy. Based on my clinical evaluation, I do not appreciate any immediate, emergent, or life threatening condition or etiology that warrants additional workup today and feel that the patient can be discharged with close follow up care. Case discussed with Dr. Christianson who also evaluated pt and who is agreeable to plan of care. Follow up and return precautions discussed; patient verbalized understanding and is agreeable to plan of care. Patient  discharged home in stable condition.                              Clinical Impression:   Final diagnoses:  [N93.9] Vaginal bleeding (Primary)        ED Disposition Condition    Discharge Stable          ED Prescriptions    None       Follow-up Information       Follow up With Specialties Details Why Contact Info    gynecologist  In 1 week      [facility] Emergency Dept Emergency Medicine  As needed, If symptoms worsen [address] [place] [id number]  [phone]             Dulce Hollins NP  04/26/23 7850

## 2023-04-26 NOTE — FIRST PROVIDER EVALUATION
Emergency Department TeleTriage Encounter Note      CHIEF COMPLAINT    Chief Complaint   Patient presents with    Vaginal Bleeding     Vaginal bleeding since Saturday.  Pt had a hysterectomy 2013. Bleeding increased this morning.         VITAL SIGNS   Initial Vitals [04/26/23 1145]   BP Pulse Resp Temp SpO2   118/72 109 18 98.5 °F (36.9 °C) 97 %      MAP       --            ALLERGIES    Review of patient's allergies indicates:   Allergen Reactions    Levaquin [levofloxacin] Swelling and Rash     Joint pain, abdominal pain       PROVIDER TRIAGE NOTE  Patient presents with complaint of vaginal bleeding for a few days. Seen at [place] and had a CT scan. Reports prior hysterectomy. Denied pain.      Phy:   Constitutional: well nourished, well developed, appearing stated age, NAD   HEENT: NCAT, symmetrical lids, No obvious facial deformity.  Normal phonation. Normal Conjunctiva   Neck: NAROM   Respiratory: Normal effort.  No obvious use of accessory muscles   Musculoskeletal: Moved upper extremities well   Neuro: Alert, answers questions appropriately    Psych: appropriate mood and affect      Initial orders will be placed and care will be transferred to an alternate provider when patient is roomed for a full evaluation. Any additional orders and the final disposition will be determined by that provider.        ORDERS  Labs Reviewed - No data to display    ED Orders (720h ago, onward)      None              Virtual Visit Note: The provider triage portion of this emergency department evaluation and documentation was performed via [employer], a HIPAA-compliant telemedicine application, in concert with a tele-presenter in the room. A face to face patient evaluation with one of my colleagues will occur once the patient is placed in an emergency department room.      DISCLAIMER: This note was prepared with [employer] voice recognition transcription software. Garbled syntax, mangled pronouns, and other bizarre constructions  may be attributed to that software system.

## 2023-04-26 NOTE — ED NOTES
Pt reports vaginal bleeding starting 04/24/2023 but worsening today. Pt states surgical hx of partial hysterectomy with 1 ovary being left. Pt reports abdominal fullness. Pt denies fever, chills, chest pain, shortness of breath.

## 2023-04-27 LAB
C TRACH DNA SPEC QL NAA+PROBE: NOT DETECTED
N GONORRHOEA DNA SPEC QL NAA+PROBE: NOT DETECTED

## 2023-05-01 ENCOUNTER — CLINICAL SUPPORT (OUTPATIENT)
Dept: REHABILITATION | Facility: HOSPITAL | Age: 38
End: 2023-05-01
Payer: MEDICAID

## 2023-05-01 DIAGNOSIS — M53.86 DECREASED ROM OF LUMBAR SPINE: ICD-10-CM

## 2023-05-01 DIAGNOSIS — M54.41 CHRONIC MIDLINE LOW BACK PAIN WITH BILATERAL SCIATICA: ICD-10-CM

## 2023-05-01 DIAGNOSIS — G89.29 CHRONIC MIDLINE LOW BACK PAIN WITH BILATERAL SCIATICA: ICD-10-CM

## 2023-05-01 DIAGNOSIS — M54.42 CHRONIC MIDLINE LOW BACK PAIN WITH BILATERAL SCIATICA: ICD-10-CM

## 2023-05-01 DIAGNOSIS — M79.606 LOW BACK PAIN RADIATING TO LOWER EXTREMITY: ICD-10-CM

## 2023-05-01 DIAGNOSIS — Z74.09 IMPAIRED FUNCTIONAL MOBILITY AND ACTIVITY TOLERANCE: Primary | ICD-10-CM

## 2023-05-01 DIAGNOSIS — M54.50 LOW BACK PAIN RADIATING TO LOWER EXTREMITY: ICD-10-CM

## 2023-05-01 PROCEDURE — 97140 MANUAL THERAPY 1/> REGIONS: CPT | Mod: PN

## 2023-05-01 PROCEDURE — 97163 PT EVAL HIGH COMPLEX 45 MIN: CPT | Mod: PN

## 2023-05-01 NOTE — PLAN OF CARE
"[facility]   Physical Therapy Initial Evaluation      Name: Corina Liu  Clinic Number: [id number]    Therapy Diagnosis:   Encounter Diagnoses   Name Primary?    Impaired functional mobility and activity tolerance Yes    Decreased ROM of lumbar spine     Chronic midline low back pain with bilateral sciatica     Low back pain radiating to lower extremity         Physician: Melva Brito MD    Physician Orders: PT Eval and Treat   Medical Diagnosis from Referral: Chronic midline low back pain with bilateral sciatica; Low back pain radiating to lower extremity   Evaluation Date: 5/1/2023  Authorization Period Expiration: 04/18/2024   Plan of Care Expiration: 6/21/2023  Progress Note Due: 6/21/2023  Visit # / Visits authorized: 1/ 1   FOTO: Next survey due week of 5/14/2023    Precautions: cancer     Time In: 1240  Time Out: 1325  Total Appointment Time (timed & untimed codes): 40 minutes    Subjective     Date of onset: Chronic for several years. Worsening recently 3/2023    History of current condition - Corina reports: she has had chronic problem with her back extending down LE L more so than R.  She has had multiple injuries over the past few years that could have contributed to symptoms.  She also has had her thyroid and parathyroid glands removed that contribute to decreased calcium levels, foraminal stenosis and family history of back problems. She reports that symptoms have worsened over the past few months, saw MD 3/2023 seeking treatment for worsening symptoms.         Falls: Reports being off balance at times with near falls but no actual falls.      Imaging: X-Ray Lumbar Spine Ap And Lateral 01/27/2023: Impression per report: "No acute lumbar spine injury  MRI Lumbar Spine Without Contrast 08/16/2022 Impression per report: "1. Degenerative disc disease at L4-L5 resulting in mild central spinal canal stenosis with mild neural foraminal narrowing; 2. Small annular " "fissure of the L4-5 disc; 3. 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  Able to navigate steps without significant deficits.    Current Level of Function: Difficulty with walking for "long period" (~ 15-20 minutes), unable to sweep/mop, LE paresthesias with prolonged sitting, difficulty with sit <> stand transfers, car transfers; difficulty getting comfortable to go to sleep; sleep is intermittently disturbed by lower back pain.  Difficulty ascending steps.       Pain:  Current 5/10, worst 10+/10, best 2/10   Location: bilateral lower back (patient indicating SI region) with L extending up a little more than R, Also extends down B LE to toes with L being worse than R.     Description: constant aching and throbbing with intermittent tingling, numbness and sharp, shooting pain.    Aggravating Factors: Walking, prolonged sitting, sweeping/mopping, sit <>stand transfers/car transfers.   Easing Factors: laying in fetal position with pillow between knees.  Sitting with L hip flexed and externally rotated with foot tucked under buttock.     Patients goals: to have more movement, to figure out what's causing it, to be able to do more.       Medical History:   Past Medical History:   Diagnosis Date    Cancer     Thyroid    Gastro-esophageal reflux     Hepatitis C 2016    antibody positive only    History of PCOS     Hypoglycemia     Hypothyroidism (acquired)     IV drug user 3/10/2019    Myocardial infarction     PTSD (post-traumatic stress disorder)     Pyelonephritis 3/10/2019    Sepsis 3/10/2019    Tachycardia     Thyroid disease     hypothyroid, pappilary carcinoma       Surgical History:   Corina Bui " Noe  has a past surgical history that includes Tubal ligation;  section; TONSILLECTOMY, ADENOIDECTOMY; Thyroidectomy; thyroid nodule biopsy; lymthnodes; Hysterectomy; and Shoulder surgery (Right, 2022).    Medications:   Corina has a current medication list which includes the following prescription(s): albuterol, alprazolam, azelastine, fluconazole, gabapentin, levothyroxine, lisdexamfetamine, omeprazole, oxycodone-acetaminophen, sulfamethoxazole-trimethoprim 800-160mg, and valacyclovir, and the following Facility-Administered Medications: cyanocobalamin.    Allergies:   Review of patient's allergies indicates:   Allergen Reactions    Levaquin [levofloxacin] Swelling and Rash     Joint pain, abdominal pain        Objective      Posture: Standing weight shifted somewhat toward R but pelvis and shoulders are nearly even.  Sitting: flattened lumbar lordosis, increased thoracic kyphosis, minimal to moderate rounded shoulder and forward head posture.   Palpation: Tenderness present B lumbar paraspinals R > L, R gluteus dontae,  B piriformis muscles, proximal hamstrings.  Increased muscle tension B lumbar paraspinals, B hamstrings  Sensation: Intact to light touch B LE but reports intermittent   DTRs: Knee jerk +1 and equal.  Ankle jerk not tested due to patient having scraped her ankle   Range of Motion/Strength:   Thoracic/Lumbar AROM: Pain/Dysfunction with Movement:   Flexion                       90* - 50* = 40*    Extension                  18* - 7* = 11*  Worse than flexion   Right side bending    30*  Increased pain   Left side bending      30*    Right rotation            75%    Left rotation              40%    LE ROM Grossly WFL B   Strength: R LE grossly 4/5-4+/5; L hip 4-/5; knee 4-/5; ankle 4/5    Flexibility: Hamstring length 145* B; piriformis moderate tightness B; hip flexors WNL, calves moderate tightness B.    Gait: Without AD  Analysis: Somewhat guarded  Bed Mobility:Independent with  difficulty/guarding  Transfers: Independent with increased UE support  Special Tests: SI joint play: decreased discomfort with posterior to anterior glide of sacrum on ilium; increased pain with posterior to anterior glide of ilium on sacrum on L.  Increased discomfort with posterior to anterior glide of L 3, 4, and 5.     Other: N/A     Limitation/Restriction for FOTO Lumbar Spine Survey    Therapist reviewed FOTO scores for Corina Liu on 5/1/2023.   FOTO documents entered into [employer] - see Media section.    Limitation Score: 63%         Treatment     Total Treatment time (time-based codes) separate from Evaluation: 8 minutes      Corina received the treatments listed below:      manual therapy techniques: Soft tissue Mobilization, joint mobilization were applied to the: lower back/buttock for 8 minutes, including:  Strumming and deep pressure to lumbar paraspinals, B proximal hamstrings  Posterior to anterior glide of L sacrum on L ilium      Patient Education and Home Exercises     Education provided:   - Discussed role of PT and proposed POC.      Written Home Exercises Provided:  To be issued during subsequent visits . Exercises were reviewed and Corina was able to demonstrate them prior to the end of the session.  Corina demonstrated good  understanding of the education provided. See EMR under Patient Instructions for exercises provided during therapy sessions.    Assessment     Corina is a 37 y.o. female referred to outpatient Physical Therapy with a medical diagnosis of Chronic midline low back pain with bilateral sciatica; Low back pain radiating to lower extremity . Patient presents with increased back pain extending into LE, impaired posture, increased muscle tension, increased tenderness, impaired joint play, decreased lumbar ROM, decreased LE flexibility.  These problems contribute to increased difficulty participating in work activities, performing household chores, going grocery  shopping, and participating in her desired recreational activities.  She should benefit from skilled PT services to address these problems in order to minimize functional deficit and to improve activity tolerance so that she may resume performing home care activities, participate in work activities, and become more involved in family activities.      Patient prognosis is Fair.   Patient will benefit from skilled outpatient Physical Therapy to address the deficits stated above and in the chart below, provide patient /family education, and to maximize patientt's level of independence.     Plan of care discussed with patient: Yes  Patient's spiritual, cultural and educational needs considered and patient is agreeable to the plan of care and goals as stated below:     Anticipated Barriers for therapy: Significant grief, long term presence of symptoms.      Medical Necessity is demonstrated by the following  History  Co-morbidities and personal factors that may impact the plan of care Co-morbidities:   difficulty sleeping, history of cancer, and significant grief, PTSD, history of MI/tachycardia     Personal Factors:   Significant grief, recent move     high   Examination  Body Structures and Functions, activity limitations and participation restrictions that may impact the plan of care Body Regions:   back  lower extremities    Body Systems:    gross symmetry  ROM  strength  balance  gait  transfers    Participation Restrictions:   None anticipated    Activity limitations:   Learning and applying knowledge  no deficits    General Tasks and Commands  no deficits    Communication  no deficits    Mobility  lifting and carrying objects  walking  driving (bike, car, motorcycle)    Self care  washing oneself (bathing, drying, washing hands)  dressing    Domestic Life  shopping  cooking  doing house work (cleaning house, washing dishes, laundry)  assisting others    Interactions/Relationships  no deficits    Life  Areas  employment    Community and Social Life  community life  recreation and leisure         high   Clinical Presentation unstable clinical presentation with unpredictable characteristics high   Decision Making/ Complexity Score: high     Goals:  Short Term Goals: 3 weeks   1) Decrease max pain to < 8/10  2) Facilitate improved LE flexibility  3) Facilitate improved Posture  4) Patient will perform sit > stand transfer after sitting > 30 min with minimal UE support 5 of 10 trials    Long Term Goals: 6 weeks   1) Increase walking tolerance to > 30 min   2) Patient will sit > 45 min without increased LE paresthesias   3) Patient will bend to perform LE dressing without increased back/LE pain   4) Patient will sweep/mop > 20 min without increased back/LE pain   5) Improve functional deficit to < 40%   6) Patient will be independent in HEP for flexibility, trunk/lumbar ROM, core strength, and postural correction    Plan     Plan of care Certification: 5/1/2023 to 6/21/2023.    Outpatient Physical Therapy 2 times weekly for 6 weeks to include the following interventions: Electrical Stimulation IFC/TENS, Manual Therapy, Moist Heat/ Ice, Patient Education, Therapeutic Exercise, and Functional Dry Needling .     Linda Huddleston, PT

## 2023-05-04 DIAGNOSIS — M79.606 LOW BACK PAIN RADIATING TO LOWER EXTREMITY: ICD-10-CM

## 2023-05-04 DIAGNOSIS — M54.41 CHRONIC MIDLINE LOW BACK PAIN WITH BILATERAL SCIATICA: ICD-10-CM

## 2023-05-04 DIAGNOSIS — M54.42 CHRONIC MIDLINE LOW BACK PAIN WITH BILATERAL SCIATICA: ICD-10-CM

## 2023-05-04 DIAGNOSIS — G89.29 CHRONIC MIDLINE LOW BACK PAIN WITH BILATERAL SCIATICA: ICD-10-CM

## 2023-05-04 DIAGNOSIS — F43.21 GRIEF: ICD-10-CM

## 2023-05-04 DIAGNOSIS — M54.50 LOW BACK PAIN RADIATING TO LOWER EXTREMITY: ICD-10-CM

## 2023-05-04 DIAGNOSIS — F41.9 ANXIETY: ICD-10-CM

## 2023-05-04 DIAGNOSIS — G47.00 INSOMNIA, UNSPECIFIED TYPE: ICD-10-CM

## 2023-05-05 ENCOUNTER — CLINICAL SUPPORT (OUTPATIENT)
Dept: REHABILITATION | Facility: HOSPITAL | Age: 38
End: 2023-05-05
Payer: MEDICAID

## 2023-05-05 DIAGNOSIS — M54.50 LOW BACK PAIN RADIATING TO LOWER EXTREMITY: ICD-10-CM

## 2023-05-05 DIAGNOSIS — Z74.09 IMPAIRED FUNCTIONAL MOBILITY AND ACTIVITY TOLERANCE: Primary | ICD-10-CM

## 2023-05-05 DIAGNOSIS — M53.86 DECREASED ROM OF LUMBAR SPINE: ICD-10-CM

## 2023-05-05 DIAGNOSIS — M54.42 CHRONIC MIDLINE LOW BACK PAIN WITH BILATERAL SCIATICA: ICD-10-CM

## 2023-05-05 DIAGNOSIS — M79.606 LOW BACK PAIN RADIATING TO LOWER EXTREMITY: ICD-10-CM

## 2023-05-05 DIAGNOSIS — G89.29 CHRONIC MIDLINE LOW BACK PAIN WITH BILATERAL SCIATICA: ICD-10-CM

## 2023-05-05 DIAGNOSIS — M54.41 CHRONIC MIDLINE LOW BACK PAIN WITH BILATERAL SCIATICA: ICD-10-CM

## 2023-05-05 PROCEDURE — 97014 ELECTRIC STIMULATION THERAPY: CPT | Mod: PN,CQ

## 2023-05-05 PROCEDURE — 97140 MANUAL THERAPY 1/> REGIONS: CPT | Mod: PN,CQ

## 2023-05-05 PROCEDURE — 97110 THERAPEUTIC EXERCISES: CPT | Mod: PN,CQ

## 2023-05-05 RX ORDER — OXYCODONE AND ACETAMINOPHEN 10; 325 MG/1; MG/1
1 TABLET ORAL EVERY 8 HOURS PRN
Qty: 15 TABLET | Refills: 0 | Status: SHIPPED | OUTPATIENT
Start: 2023-05-05 | End: 2023-05-18 | Stop reason: SDUPTHER

## 2023-05-05 RX ORDER — ALPRAZOLAM 0.5 MG/1
0.5 TABLET ORAL 3 TIMES DAILY PRN
Qty: 30 TABLET | Refills: 0 | Status: SHIPPED | OUTPATIENT
Start: 2023-05-05 | End: 2023-05-26 | Stop reason: SDUPTHER

## 2023-05-05 NOTE — PROGRESS NOTES
"[facility]   Physical Therapy Treatment Note     Name: Corina Liu  Clinic Number: [id number]    Therapy Diagnosis:   Encounter Diagnoses   Name Primary?    Impaired functional mobility and activity tolerance Yes    Decreased ROM of lumbar spine     Chronic midline low back pain with bilateral sciatica     Low back pain radiating to lower extremity      Physician: Melva Brito MD    Visit Date: 5/5/2023    Physician Orders: PT Eval and Treat   Medical Diagnosis from Referral: Chronic midline low back pain with bilateral sciatica; Low back pain radiating to lower extremity   Evaluation Date: 5/1/2023  Authorization Period Expiration: 04/18/2024   Plan of Care Expiration: 6/21/2023  Progress Note Due: 6/21/2023  Visit # / Visits authorized: 1/ 20 (2 total)   FOTO: Next survey due week of 5/14/2023     Precautions: cancer     PTA Visit #: 1/5     Time In: 1245  Time Out: 1346  Total Billable Time: 60 minutes (15' of which w/ESTIM)    SUBJECTIVE     Pt reports: "I am always in some pain. I am accident prone. Nothing happens to me if it isn't bad; I am ebony this body is still in tact. If I sit for too long, or like at a restaurant I have to sit in a noyola, I will cross my legs because my legs will go numb. I have had tingling in the lower part of my foot the last couple of years."  She was not compliant with home exercise program because one has not been issued prior.  Response to previous treatment: No change reported  Functional change: N/A at this time    Pain: 6/10  Location: bilateral back (L LE worse symptoms than R most of the time)    OBJECTIVE     Objective Measures updated at progress report unless specified.     Treatment     Corina received the treatments listed below:      therapeutic exercises to develop strength, endurance, ROM, flexibility, posture, and core stabilization for 37 minutes including:  Seated hamstring stretch 2x30s ea  Piriformis stretch 2x30s " ea  Thoracolumbar AROM 10x3s   Upper trunk rotation   Side-bending   Flexion   Extension  Standing gastroc stretch 3x30s  Supine hip flexor stretch 3x30s ea   Lower trunk rotation 10x3s   Pelvic isometric w/Therabar 5x5s ea   Posterior pelvic tilt 10x3s    To be added:   Supine nerve glide   Core & Postural strengthening    manual therapy techniques: Soft tissue Mobilization were applied to the: lumbar paraspinals for 8 minutes, including:  Strumming & Triggerpoint release    supervised modalities after being cleared for contradictions: Pre-modulated Electrical Stimulation:  Corina received Pre-modulated Electrical Stimulation for pain control applied to the paraspinals in two different channels on either side of the lumbar spine. Pt received stimulation at 100% scan at a frequency of 4000Hz for 15 minutes. Corina tolerated treatment well without any adverse effects.      hot pack in conjunction with ESTIM modalities to lumbar region for pain reduction and soft tissue relaxation.      Patient Education and Home Exercises     Home Exercises Provided and Patient Education Provided     Education provided:   - The patient was instructed in initial therapeutic treatment program as stated above.    Written Home Exercises Provided:  The patient is to be provided formal written home exercise program in subsequent sessions . Exercises were reviewed and Corina was able to demonstrate them prior to the end of the session.  Corina demonstrated fair  understanding of the education provided. See EMR under Patient Instructions for exercises provided during therapy sessions    ASSESSMENT     Patient reports to PT with present pain and discomfort. Corina Liu was instructed in initial therapeutic treatment program as stated above to focus on flexibility, range of motion, stability, core strength, and posture. The patient could not tolerate lower extremity stretches well today due to lumbar pain. Manual techniques were  provided to promote increased soft tissue mobility and reduce soft tissue restrictions. The patient requested modalities post.    Corina Is progressing slowly towards her goals.   Pt prognosis is Fair.     Pt will continue to benefit from skilled outpatient physical therapy to address the deficits listed in the problem list box on initial evaluation, provide pt/family education and to maximize pt's level of independence in the home and community environment.     Pt's spiritual, cultural and educational needs considered and pt agreeable to plan of care and goals.     Anticipated barriers to physical therapy: Significant grief, long term presence of symptoms.      Goals:   Short Term Goals: 3 weeks   1) Decrease max pain to < 8/10 Ongoing  2) Facilitate improved LE flexibility Initiated  3) Facilitate improved Posture Initiated  4) Patient will perform sit > stand transfer after sitting > 30 min with minimal UE support 5 of 10 trials Ongoing     Long Term Goals: 6 weeks   1) Increase walking tolerance to > 30 min Ongoing              2) Patient will sit > 45 min without increased LE paresthesias Ongoing              3) Patient will bend to perform LE dressing without increased back/LE pain Initiated              4) Patient will sweep/mop > 20 min without increased back/LE pain Ongoing              5) Improve functional deficit to < 40% Ongoing              6) Patient will be independent in HEP for flexibility, trunk/lumbar ROM, core strength, and postural correction Ongoing    PLAN     POC: Outpatient Physical Therapy 2 times weekly for 6 weeks to include the following interventions: Electrical Stimulation IFC/TENS, Manual Therapy, Moist Heat/ Ice, Patient Education, Therapeutic Exercise, and Functional Dry Needling.     The patient is to be progressed within the established plan of care as tolerated in order to accomplish goals as stated above. Plan to incorporate nerve glides for neuropathic symptom management,  core strengthening, and postural strengthening in subsequent session(s). Review mechanics for activities of daily living prior to discharge.    Emilie Radford, PTA

## 2023-05-05 NOTE — TELEPHONE ENCOUNTER
Prescription drug monitoring program has been reviewed and is consistent with patient's prescription history. There is no evidence of early fills or obtaining controlled rx's from multiple providers.

## 2023-05-12 ENCOUNTER — CLINICAL SUPPORT (OUTPATIENT)
Dept: REHABILITATION | Facility: HOSPITAL | Age: 38
End: 2023-05-12
Payer: MEDICAID

## 2023-05-12 DIAGNOSIS — M54.42 CHRONIC MIDLINE LOW BACK PAIN WITH BILATERAL SCIATICA: ICD-10-CM

## 2023-05-12 DIAGNOSIS — G89.29 CHRONIC MIDLINE LOW BACK PAIN WITH BILATERAL SCIATICA: ICD-10-CM

## 2023-05-12 DIAGNOSIS — M54.41 CHRONIC MIDLINE LOW BACK PAIN WITH BILATERAL SCIATICA: ICD-10-CM

## 2023-05-12 DIAGNOSIS — Z74.09 IMPAIRED FUNCTIONAL MOBILITY AND ACTIVITY TOLERANCE: ICD-10-CM

## 2023-05-12 DIAGNOSIS — M79.606 LOW BACK PAIN RADIATING TO LOWER EXTREMITY: Primary | ICD-10-CM

## 2023-05-12 DIAGNOSIS — M54.50 LOW BACK PAIN RADIATING TO LOWER EXTREMITY: Primary | ICD-10-CM

## 2023-05-12 DIAGNOSIS — M53.86 DECREASED ROM OF LUMBAR SPINE: ICD-10-CM

## 2023-05-12 PROCEDURE — 97014 ELECTRIC STIMULATION THERAPY: CPT | Mod: PN

## 2023-05-12 PROCEDURE — 97110 THERAPEUTIC EXERCISES: CPT | Mod: PN

## 2023-05-12 NOTE — PROGRESS NOTES
"[facility]   Physical Therapy Treatment Note     Name: Corina Liu  Clinic Number: [id number]    Therapy Diagnosis:   Encounter Diagnoses   Name Primary?    Low back pain radiating to lower extremity Yes    Chronic midline low back pain with bilateral sciatica     Decreased ROM of lumbar spine     Impaired functional mobility and activity tolerance      Physician: Melva Brito MD    Visit Date: 5/12/2023    Physician Orders: PT Eval and Treat   Medical Diagnosis from Referral: Chronic midline low back pain with bilateral sciatica; Low back pain radiating to lower extremity   Evaluation Date: 5/1/2023  Authorization Period Expiration: 04/18/2024   Plan of Care Expiration: 6/21/2023  Progress Note Due: 6/21/2023  Visit # / Visits authorized: 3/ 20 (4 total)   FOTO: Next survey due week of 5/14/2023     Precautions: cancer     PTA Visit #: 0/5     Time In: 1336  Time Out: 1440  Total Billable Time: 60 minutes (15' of which w/ESTIM)    SUBJECTIVE     Pt reports: " when I left PT last time my upper back was hurting...."   She was not compliant with home exercise program because one has not been issued prior.  Response to previous treatment: No change in LB reported  Functional change: N/A at this time    Pain: 5-6/10  Location: bilateral back and LLE    OBJECTIVE     Objective Measures updated at progress report unless specified.     Treatment     Corina received the treatments listed below:      therapeutic exercises to develop strength, endurance, ROM, flexibility, posture, and core stabilization for 38 minutes including:  Seated hamstring stretch 2x30s ea  Piriformis stretch 2x30s ea  Thoracolumbar AROM 10x3s   Upper trunk rotation   Side-bending   Flexion   Extension  Standing gastroc stretch 3x30s  Supine hip flexor stretch 3x30s ea   Lower trunk rotation 10x3s   Pelvic isometric w/Therabar 5x5s ea   Posterior pelvic tilt 10x3s              Sciatic nerve glides 2 x 5 " ea    To be added:   Core & Postural strengthening    manual therapy techniques: Soft tissue Mobilization were applied to the: lumbar paraspinals for 0 minutes, including:  Strumming & Triggerpoint release    supervised modalities after being cleared for contradictions: Pre-modulated Electrical Stimulation:  Corina received Pre-modulated Electrical Stimulation for pain control applied to the paraspinals in two different channels on either side of the lumbar spine. Pt received stimulation at 100% scan at a frequency of 4000Hz for 15 minutes. Corina tolerated treatment in prone lying well without any adverse effects.      hot pack in conjunction with ESTIM modalities to lumbar region for pain reduction and soft tissue relaxation.      Patient Education and Home Exercises     Home Exercises Provided and Patient Education Provided     Education provided:   - The patient was instructed in initial therapeutic treatment program as stated above.    Written Home Exercises Provided:  The patient is to be provided formal written home exercise program in subsequent sessions . Exercises were reviewed and Croina was able to demonstrate them prior to the end of the session.  Corina demonstrated fair  understanding of the education provided. See EMR under Patient Instructions for exercises provided during therapy sessions    ASSESSMENT     Patient reporting 5-6/10 pain in her bilateral low back today and left leg. Corina Noe tolerated above exercises fair with focus on flexibility, range of motion, stability, core strength, and posture.  She required verbal/tactile cueing throughout treatment session for exercise technique, postural correction, and attention to task. The patient requested modalities post.    Corina Is progressing slowly towards her goals.   Pt prognosis is Fair.     Pt will continue to benefit from skilled outpatient physical therapy to address the deficits listed in the problem list box on initial  evaluation, provide pt/family education and to maximize pt's level of independence in the home and community environment.     Pt's spiritual, cultural and educational needs considered and pt agreeable to plan of care and goals.     Anticipated barriers to physical therapy: Significant grief, long term presence of symptoms.      Goals:   Short Term Goals: 3 weeks   1) Decrease max pain to < 8/10 Ongoing  2) Facilitate improved LE flexibility Initiated  3) Facilitate improved Posture Initiated  4) Patient will perform sit > stand transfer after sitting > 30 min with minimal UE support 5 of 10 trials Ongoing     Long Term Goals: 6 weeks   1) Increase walking tolerance to > 30 min Ongoing              2) Patient will sit > 45 min without increased LE paresthesias Ongoing              3) Patient will bend to perform LE dressing without increased back/LE pain Initiated              4) Patient will sweep/mop > 20 min without increased back/LE pain Ongoing              5) Improve functional deficit to < 40% Ongoing              6) Patient will be independent in HEP for flexibility, trunk/lumbar ROM, core strength, and postural correction Ongoing    PLAN     POC: Outpatient Physical Therapy 2 times weekly for 6 weeks to include the following interventions: Electrical Stimulation IFC/TENS, Manual Therapy, Moist Heat/ Ice, Patient Education, Therapeutic Exercise, and Functional Dry Needling.     The patient is to be progressed within the established plan of care as tolerated in order to accomplish goals as stated above. Plan to incorporate nerve glides for neuropathic symptom management, core strengthening, and postural strengthening in subsequent session(s). Review mechanics for activities of daily living prior to discharge.    Gildardo Morataya, PT

## 2023-05-15 ENCOUNTER — CLINICAL SUPPORT (OUTPATIENT)
Dept: REHABILITATION | Facility: HOSPITAL | Age: 38
End: 2023-05-15
Payer: MEDICAID

## 2023-05-15 DIAGNOSIS — Z74.09 IMPAIRED FUNCTIONAL MOBILITY AND ACTIVITY TOLERANCE: Primary | ICD-10-CM

## 2023-05-15 DIAGNOSIS — M54.50 LOW BACK PAIN RADIATING TO LOWER EXTREMITY: ICD-10-CM

## 2023-05-15 DIAGNOSIS — M54.42 CHRONIC MIDLINE LOW BACK PAIN WITH BILATERAL SCIATICA: ICD-10-CM

## 2023-05-15 DIAGNOSIS — M54.41 CHRONIC MIDLINE LOW BACK PAIN WITH BILATERAL SCIATICA: ICD-10-CM

## 2023-05-15 DIAGNOSIS — M79.606 LOW BACK PAIN RADIATING TO LOWER EXTREMITY: ICD-10-CM

## 2023-05-15 DIAGNOSIS — G89.29 CHRONIC MIDLINE LOW BACK PAIN WITH BILATERAL SCIATICA: ICD-10-CM

## 2023-05-15 DIAGNOSIS — M53.86 DECREASED ROM OF LUMBAR SPINE: ICD-10-CM

## 2023-05-15 PROCEDURE — 97014 ELECTRIC STIMULATION THERAPY: CPT | Mod: PN,CQ

## 2023-05-15 PROCEDURE — 97140 MANUAL THERAPY 1/> REGIONS: CPT | Mod: PN,CQ

## 2023-05-15 PROCEDURE — 97110 THERAPEUTIC EXERCISES: CPT | Mod: PN,CQ

## 2023-05-15 NOTE — PROGRESS NOTES
"[facility]   Physical Therapy Treatment Note     Name: Corina Liu  Clinic Number: [id number]    Therapy Diagnosis:   Encounter Diagnoses   Name Primary?    Impaired functional mobility and activity tolerance Yes    Decreased ROM of lumbar spine     Chronic midline low back pain with bilateral sciatica     Low back pain radiating to lower extremity      Physician: Melva Brito MD    Visit Date: 5/15/2023    Physician Orders: PT Eval and Treat   Medical Diagnosis from Referral: Chronic midline low back pain with bilateral sciatica; Low back pain radiating to lower extremity   Evaluation Date: 5/1/2023  Authorization Period Expiration: 04/18/2024   Plan of Care Expiration: 6/21/2023  Progress Note Due: 6/21/2023  Visit # / Visits authorized: 3/ 20 (4 total)   FOTO: Next survey due week of 5/14/2023     Precautions: cancer     PTA Visit #: 1/5     Time In: 1240  Time Out: 1400  Total Billable Time: 75 minutes (15' of which w/ESTIM)    SUBJECTIVE     Pt reports: "I am hurting pretty bad today. My ankles were bothering me with the new one she had me doing last time." "I want to be able to walk without pain by November because I am going on trip to [place]. That is my goal."  She was not compliant with home exercise program because one has not been issued prior.  Response to previous treatment: "its going up my upper back now too"  Functional change: N/A at this time    Pain: 8/10  Location: bilateral back and LLE    OBJECTIVE     Objective Measures updated at progress report unless specified.     Treatment     Corina received the treatments listed below:      therapeutic exercises to develop strength, endurance, ROM, flexibility, posture, and core stabilization for 52 minutes including:   Recumbent bike lvl1 x6'  Seated hamstring stretch 3x30s ea  Piriformis stretch 3x30s ea  Thoracolumbar AROM 10x3s   Upper trunk rotation   Side-bending   Flexion   Extension  Standing gastroc " stretch 3x30s   L LE: x10 ea  Hip abduction  Hip extension  Hamstring curl  March  Red Tband postural strengthening x10 ea   Rows w/scapular retractions   B Extension w/scapular retractions   B External rotation w/scapular retractions  Supine hip flexor stretch 3x30s ea   Lower trunk rotation 10x3s   DKTC w/physioball 10x3s   Pelvic isometric w/Therabar 5x5s ea   Core reverse crunch isometric w/physioball x10   Posterior pelvic tilt 15x3s    Did not perform on this date:  Sciatic nerve glides 2 x 5 ea    To be added:   Core strengthening; increased posture; multifidus walkouts    manual therapy techniques: Soft tissue Mobilization were applied to the: lumbar paraspinals for 8 minutes, including:  Strumming & Triggerpoint release    supervised modalities after being cleared for contradictions: Pre-modulated Electrical Stimulation:  Corina received Pre-modulated Electrical Stimulation for pain control applied to the paraspinals in two different channels on either side of the lumbar spine. Pt received stimulation at 100% scan at a frequency of 4000Hz for 15 minutes. Corina tolerated treatment in prone lying well without any adverse effects.      hot pack in conjunction with ESTIM modalities to lumbar region for pain reduction and soft tissue relaxation.    Patient Education and Home Exercises     Home Exercises Provided and Patient Education Provided     Education provided:   - The patient was instructed in additional therapeutic exercise as stated above in bold print.    Written Home Exercises Provided:  The patient is to be provided formal written home exercise program in subsequent sessions . Exercises were reviewed and Corina was able to demonstrate them prior to the end of the session.  Corina demonstrated fair  understanding of the education provided. See EMR under Patient Instructions for exercises provided during therapy sessions    ASSESSMENT     The patient continues to experience pain in spine, reporting  in thoracic region now as well. Corina Liu was progressed with additional lower extremity strengthening, postural strength/correction, core strengthening and stability exercises to assist in supporting the spinal column, while promoting increased range of motion and flexibility to reduce restrictions. Patient wants to focus on ambulation tolerance for upcoming trip.    Corina Is progressing slowly towards her goals.   Pt prognosis is Fair.     Pt will continue to benefit from skilled outpatient physical therapy to address the deficits listed in the problem list box on initial evaluation, provide pt/family education and to maximize pt's level of independence in the home and community environment.     Pt's spiritual, cultural and educational needs considered and pt agreeable to plan of care and goals.     Anticipated barriers to physical therapy: Significant grief, long term presence of symptoms.      Goals:   Short Term Goals: 3 weeks   1) Decrease max pain to < 8/10 Ongoing  2) Facilitate improved LE flexibility Gradually progressing  3) Facilitate improved Posture Initiated  4) Patient will perform sit > stand transfer after sitting > 30 min with minimal UE support 5 of 10 trials Ongoing     Long Term Goals: 6 weeks   1) Increase walking tolerance to > 30 min Ongoing              2) Patient will sit > 45 min without increased LE paresthesias Ongoing              3) Patient will bend to perform LE dressing without increased back/LE pain Slow progress              4) Patient will sweep/mop > 20 min without increased back/LE pain Ongoing              5) Improve functional deficit to < 40% Ongoing              6) Patient will be independent in HEP for flexibility, trunk/lumbar ROM, core strength, and postural correction Ongoing    PLAN     POC: Outpatient Physical Therapy 2 times weekly for 6 weeks to include the following interventions: Electrical Stimulation IFC/TENS, Manual Therapy, Moist Heat/ Ice, Patient  Education, Therapeutic Exercise, and Functional Dry Needling.     The patient is to be progressed within the established plan of care as tolerated in order to accomplish goals as stated above. Plan to increase core and postural strengthening and adding multifidus walkouts in subsequent session. Provide formal written home exercise program focusing on flexibility, trunk/lumbar ROM, core strength, and postural strengthening/correction. Incorporate ambulation tolerance and stepping exercises (L TKE, step up & over) in subsequent sessions as well. Review mechanics for activities of daily living prior to discharge.    Emilie Radford, PTA

## 2023-05-18 DIAGNOSIS — G89.29 CHRONIC MIDLINE LOW BACK PAIN WITH BILATERAL SCIATICA: ICD-10-CM

## 2023-05-18 DIAGNOSIS — M54.41 CHRONIC MIDLINE LOW BACK PAIN WITH BILATERAL SCIATICA: ICD-10-CM

## 2023-05-18 DIAGNOSIS — F90.2 ATTENTION DEFICIT HYPERACTIVITY DISORDER (ADHD), COMBINED TYPE: ICD-10-CM

## 2023-05-18 DIAGNOSIS — M54.42 CHRONIC MIDLINE LOW BACK PAIN WITH BILATERAL SCIATICA: ICD-10-CM

## 2023-05-18 DIAGNOSIS — Z79.899 ENCOUNTER FOR LONG-TERM CURRENT USE OF MEDICATION: ICD-10-CM

## 2023-05-18 DIAGNOSIS — M54.50 LOW BACK PAIN RADIATING TO LOWER EXTREMITY: ICD-10-CM

## 2023-05-18 DIAGNOSIS — M79.606 LOW BACK PAIN RADIATING TO LOWER EXTREMITY: ICD-10-CM

## 2023-05-19 ENCOUNTER — CLINICAL SUPPORT (OUTPATIENT)
Dept: REHABILITATION | Facility: HOSPITAL | Age: 38
End: 2023-05-19
Payer: MEDICAID

## 2023-05-19 DIAGNOSIS — M53.86 DECREASED ROM OF LUMBAR SPINE: ICD-10-CM

## 2023-05-19 DIAGNOSIS — M79.606 LOW BACK PAIN RADIATING TO LOWER EXTREMITY: ICD-10-CM

## 2023-05-19 DIAGNOSIS — M54.42 CHRONIC MIDLINE LOW BACK PAIN WITH BILATERAL SCIATICA: ICD-10-CM

## 2023-05-19 DIAGNOSIS — M54.41 CHRONIC MIDLINE LOW BACK PAIN WITH BILATERAL SCIATICA: ICD-10-CM

## 2023-05-19 DIAGNOSIS — M54.50 LOW BACK PAIN RADIATING TO LOWER EXTREMITY: ICD-10-CM

## 2023-05-19 DIAGNOSIS — Z74.09 IMPAIRED FUNCTIONAL MOBILITY AND ACTIVITY TOLERANCE: Primary | ICD-10-CM

## 2023-05-19 DIAGNOSIS — G89.29 CHRONIC MIDLINE LOW BACK PAIN WITH BILATERAL SCIATICA: ICD-10-CM

## 2023-05-19 PROCEDURE — 97110 THERAPEUTIC EXERCISES: CPT | Mod: PN

## 2023-05-19 PROCEDURE — 97014 ELECTRIC STIMULATION THERAPY: CPT | Mod: PN

## 2023-05-19 RX ORDER — LISDEXAMFETAMINE DIMESYLATE 60 MG/1
60 CAPSULE ORAL EVERY MORNING
Qty: 30 CAPSULE | Refills: 0 | Status: SHIPPED | OUTPATIENT
Start: 2023-05-19 | End: 2023-06-21 | Stop reason: SDUPTHER

## 2023-05-19 RX ORDER — OXYCODONE AND ACETAMINOPHEN 10; 325 MG/1; MG/1
1 TABLET ORAL EVERY 12 HOURS PRN
Qty: 10 TABLET | Refills: 0 | Status: SHIPPED | OUTPATIENT
Start: 2023-05-19 | End: 2023-06-09 | Stop reason: SDUPTHER

## 2023-05-19 NOTE — PROGRESS NOTES
"[facility]   Physical Therapy Treatment Note     Name: Corina Liu  Clinic Number: [id number]    Therapy Diagnosis:   Encounter Diagnoses   Name Primary?    Impaired functional mobility and activity tolerance Yes    Decreased ROM of lumbar spine     Chronic midline low back pain with bilateral sciatica     Low back pain radiating to lower extremity      Physician: Melva Brito MD    Visit Date: 5/19/2023    Physician Orders: PT Eval and Treat   Medical Diagnosis from Referral: Chronic midline low back pain with bilateral sciatica; Low back pain radiating to lower extremity   Evaluation Date: 5/1/2023  Authorization Period Expiration: 04/18/2024   Plan of Care Expiration: 6/21/2023  Progress Note Due: 6/21/2023  Visit # / Visits authorized: 4/ 20 (5 total)   FOTO: Next survey due week of 5/22/2023     Precautions: cancer     PTA Visit #: 0/5     Time In: 1443 (patient on phone upon arrival and not ready to start PT right away)  Time Out: 1545  Total Billable Time: 55 minutes (15' of which heat w/ESTIM)    SUBJECTIVE     Pt reports: "My wrist is giving me a lot of problems today. I need to have surgery but I can't right now caus."  She was not compliant with home exercise program because one has not been issued prior.  Response to previous treatment: "Okay"  Functional change: N/A at this time    Pain: 8/10  Location: bilateral back and LLE    OBJECTIVE     Objective Measures updated at progress report unless specified.     Treatment     Corina received the treatments listed below:      therapeutic exercises to develop strength, endurance, ROM, flexibility, posture, and core stabilization for 35 minutes including:   Recumbent bike lvl2 x6'  Seated hamstring stretch 3x30s ea  Piriformis stretch 3x30s ea  Thoracolumbar AROM 10x3s   Upper trunk rotation   Side-bending   Flexion   Extension  Standing gastroc stretch 3x30s   L LE: x10 ea  Hip abduction  Hip extension  Hamstring " curl  March  Red Tband postural strengthening x10 ea   Rows w/scapular retractions   B Extension w/scapular retractions   B External rotation w/scapular retractions  Supine hip flexor stretch 3x30s ea   Lower trunk rotation 10x3s   DKTC w/physioball 10x3s   Pelvic isometric w/Therabar 5x5s ea   Core reverse crunch isometric w/physioball x10   Posterior pelvic tilt 15x3s    Did not perform on this date:  Sciatic nerve glides 2 x 5 ea    To be added:   Core strengthening; increased posture; multifidus walkouts. prone extension, prone press ups, alt arm/leg lift    manual therapy techniques: Soft tissue Mobilization were applied to the: lumbar paraspinals for 5 minutes, including:  Strumming & Triggerpoint release    supervised modalities after being cleared for contradictions: Pre-modulated Electrical Stimulation:  Corina received Pre-modulated Electrical Stimulation for pain control applied to the paraspinals in two different channels on either side of the lumbar spine. Pt received stimulation at 100% scan at a frequency of 4000Hz for 15 minutes. Corina tolerated treatment in prone lying well without any adverse effects.      hot pack in conjunction with ESTIM modalities to lumbar region for pain reduction and soft tissue relaxation.    Patient Education and Home Exercises     Home Exercises Provided and Patient Education Provided     Education provided:   - The patient was instructed in additional therapeutic exercise as stated above in bold print.    Written Home Exercises Provided:  Yes . Exercises were reviewed and Corina was able to demonstrate them prior to the end of the session.  Corina demonstrated fair  understanding of the education provided. See EMR under Patient Instructions for exercises provided during therapy sessions    ASSESSMENT     Patient able to perform exercise program with less difficulty and with greater speed than previous sessions.  Moderate excess muscle tension present in B lower  thoracic/lumbar paraspinals.  Symptoms somewhat relieved following estim with .      Corina Is progressing slowly towards her goals.   Pt prognosis is Fair.     Pt will continue to benefit from skilled outpatient physical therapy to address the deficits listed in the problem list box on initial evaluation, provide pt/family education and to maximize pt's level of independence in the home and community environment.     Pt's spiritual, cultural and educational needs considered and pt agreeable to plan of care and goals.     Anticipated barriers to physical therapy: Significant grief, long term presence of symptoms.      Goals:   Short Term Goals: 3 weeks   1) Decrease max pain to < 8/10 Ongoing  2) Facilitate improved LE flexibility Gradually progressing  3) Facilitate improved Posture Minimal progress  4) Patient will perform sit > stand transfer after sitting > 30 min with minimal UE support 5 of 10 trials Minimal progress     Long Term Goals: 6 weeks   1) Increase walking tolerance to > 30 min Minimal progress              2) Patient will sit > 45 min without increased LE paresthesias Ongoing              3) Patient will bend to perform LE dressing without increased back/LE pain Minimal progress              4) Patient will sweep/mop > 20 min without increased back/LE pain Ongoing              5) Improve functional deficit to < 40% Ongoing              6) Patient will be independent in HEP for flexibility, trunk/lumbar ROM, core strength, and postural correction Initiated    PLAN     POC: Outpatient Physical Therapy 2 times weekly for 6 weeks to include the following interventions: Electrical Stimulation IFC/TENS, Manual Therapy, Moist Heat/ Ice, Patient Education, Therapeutic Exercise, and Functional Dry Needling.     Trial of kinesiology taping to thoracolumbar paraspinals.  Add prone extension, prone press ups, alt arm/leg lift    Linda Huddleston, PT

## 2023-05-26 DIAGNOSIS — G47.00 INSOMNIA, UNSPECIFIED TYPE: ICD-10-CM

## 2023-05-26 DIAGNOSIS — F43.21 GRIEF: ICD-10-CM

## 2023-05-26 DIAGNOSIS — F41.9 ANXIETY: ICD-10-CM

## 2023-05-26 RX ORDER — ALPRAZOLAM 0.5 MG/1
0.5 TABLET ORAL 3 TIMES DAILY PRN
Qty: 30 TABLET | Refills: 0 | Status: SHIPPED | OUTPATIENT
Start: 2023-05-26 | End: 2023-06-21 | Stop reason: SDUPTHER

## 2023-05-29 ENCOUNTER — CLINICAL SUPPORT (OUTPATIENT)
Dept: REHABILITATION | Facility: HOSPITAL | Age: 38
End: 2023-05-29
Payer: MEDICAID

## 2023-05-29 DIAGNOSIS — M53.86 DECREASED ROM OF LUMBAR SPINE: ICD-10-CM

## 2023-05-29 DIAGNOSIS — M79.606 LOW BACK PAIN RADIATING TO LOWER EXTREMITY: ICD-10-CM

## 2023-05-29 DIAGNOSIS — M54.42 CHRONIC MIDLINE LOW BACK PAIN WITH BILATERAL SCIATICA: ICD-10-CM

## 2023-05-29 DIAGNOSIS — M54.50 LOW BACK PAIN RADIATING TO LOWER EXTREMITY: ICD-10-CM

## 2023-05-29 DIAGNOSIS — M54.41 CHRONIC MIDLINE LOW BACK PAIN WITH BILATERAL SCIATICA: ICD-10-CM

## 2023-05-29 DIAGNOSIS — Z74.09 IMPAIRED FUNCTIONAL MOBILITY AND ACTIVITY TOLERANCE: Primary | ICD-10-CM

## 2023-05-29 DIAGNOSIS — G89.29 CHRONIC MIDLINE LOW BACK PAIN WITH BILATERAL SCIATICA: ICD-10-CM

## 2023-05-29 PROCEDURE — 97014 ELECTRIC STIMULATION THERAPY: CPT | Mod: PN,CQ

## 2023-05-29 PROCEDURE — 97110 THERAPEUTIC EXERCISES: CPT | Mod: PN,CQ

## 2023-05-29 NOTE — PROGRESS NOTES
"[facility]   Physical Therapy Treatment Note     Name: Cornia Liu  Clinic Number: [id number]    Therapy Diagnosis:   Encounter Diagnoses   Name Primary?    Impaired functional mobility and activity tolerance Yes    Decreased ROM of lumbar spine     Chronic midline low back pain with bilateral sciatica     Low back pain radiating to lower extremity      Physician: Melva Brito MD    Visit Date: 5/29/2023    Physician Orders: PT Eval and Treat   Medical Diagnosis from Referral: Chronic midline low back pain with bilateral sciatica; Low back pain radiating to lower extremity   Evaluation Date: 5/1/2023  Authorization Period Expiration: 04/18/2024   Plan of Care Expiration: 6/21/2023  Progress Note Due: 6/21/2023  Visit # / Visits authorized: 5/ 20 (6 total)   FOTO: Completed 5/29/2023 53% Residual Deficit  Next survey due week of 6/12/2023     Precautions: cancer     PTA Visit #: 1/5     Time In: 1445  Time Out: 1447  Total Billable Time: 60 minutes (15' of which heat w/ESTIM)    SUBJECTIVE     Pt reports: "I am getting a little better, but I need to get this down to where its like I only need to water to maintain it."  She was somewhat compliant with home exercise program.  Response to previous treatment: "I feel like it is getting a little better."  Functional change: N/A at this time    Pain: 7/10  Location: bilateral back and LLE    OBJECTIVE     Objective Measures updated at progress report unless specified.     Treatment     Corina received the treatments listed below:      therapeutic exercises to develop strength, endurance, ROM, flexibility, posture, and core stabilization for 45 minutes including:   Recumbent bike lvl2 x6'  Seated hamstring stretch 3x30s ea  Piriformis stretch 3x30s ea  Thoracolumbar AROM 10x3s   Red Theraband resisted Upper trunk rotation   Side-bending   Flexion   Extension  Standing gastroc stretch 3x30s   L LE: x15 ea (increase)  Hip " abduction  Hip extension  Hamstring curl  March  Red Tband postural strengthening x15 ea   Rows w/scapular retractions   B Extension w/scapular retractions   B External rotation w/scapular retractions  Supine hip flexor stretch 3x30s ea   Lower trunk rotation using physioball 10x3s   DKTC w/physioball 10x3s   Pelvic isometric w/Therabar 5x5s ea   Core reverse crunch isometric w/physioball x10   Posterior pelvic tilt 20x3s    Did not perform on this date:  Sciatic nerve glides 2 x 5 ea    To be added:   Core strengthening (dying bug physioball isometric & progressing alt arm/leg lift), increased posture, multifidus walkouts, prone extension & prone press ups, increased L LE strengthening    manual therapy techniques: Soft tissue Mobilization were applied to the: lumbar paraspinals for 0 minutes, including:  Strumming & Triggerpoint release    supervised modalities after being cleared for contradictions: Pre-modulated Electrical Stimulation:  Corina received Pre-modulated Electrical Stimulation for pain control applied to the paraspinals in two different channels on either side of the lumbar spine. Pt received stimulation at 100% scan at a frequency of 4000Hz for 15 minutes. Corina tolerated treatment in prone lying well without any adverse effects.      hot pack in conjunction with ESTIM modalities to lumbar region for pain reduction and soft tissue relaxation.    Patient Education and Home Exercises     Home Exercises Provided and Patient Education Provided     Education provided:   - The patient was instructed in increased exercise complexity as stated above in bold print.    Written Home Exercises Provided: Patient instructed to cont prior HEP. Exercises were reviewed and Corina was able to demonstrate them prior to the end of the session.  Corina demonstrated fair  understanding of the education provided. See EMR under Patient Instructions for exercises provided during therapy sessions.    ASSESSMENT     The  patient reported to PT experiencing pain, reporting feeling like she is slightly progressing. Patient was somewhat tardy. Due to this the patient was slightly progressed with increased exercise complexity, but was not progressed with additional therapeutic exercise due to this. Corina Liu became emotional during treatment due to previous loss of spouse. Patient was consoled and discussed possible counseling options if needed. Modalities were provided to assist in pain reduction.    Corina Is progressing slowly towards her goals.   Pt prognosis is Fair.     Pt will continue to benefit from skilled outpatient physical therapy to address the deficits listed in the problem list box on initial evaluation, provide pt/family education and to maximize pt's level of independence in the home and community environment.     Pt's spiritual, cultural and educational needs considered and pt agreeable to plan of care and goals.     Anticipated barriers to physical therapy: Significant grief, long term presence of symptoms.      Goals:   Short Term Goals: 3 weeks   1) Decrease max pain to < 8/10 Ongoing  2) Facilitate improved LE flexibility Gradually progressing  3) Facilitate improved Posture Minimal progress  4) Patient will perform sit > stand transfer after sitting > 30 min with minimal UE support 5 of 10 trials Minimal progress     Long Term Goals: 6 weeks   1) Increase walking tolerance to > 30 min Minimal progress              2) Patient will sit > 45 min without increased LE paresthesias Ongoing              3) Patient will bend to perform LE dressing without increased back/LE pain Minimal progress              4) Patient will sweep/mop > 20 min without increased back/LE pain Minimal progress              5) Improve functional deficit to < 40% Ongoing              6) Patient will be independent in HEP for flexibility, trunk/lumbar ROM, core strength, and postural correction Gradually progressing    PLAN     POC:  Outpatient Physical Therapy 2 times weekly for 6 weeks to include the following interventions: Electrical Stimulation IFC/TENS, Manual Therapy, Moist Heat/ Ice, Patient Education, Therapeutic Exercise, and Functional Dry Needling.     The patient is to be progressed within the established plan of care as tolerated in order to accomplish goals as stated above. Plan to increase core strengthening (adding dying bug physioball isometric & progressing alt arm/leg lift) and exercise complexity (increasing core & lower extremity strengthening), and incorporate multifidus walkouts and mini squats, as well as prone press up and prone press ups. Trial of kinesiology taping to thoracolumbar paraspinals. Continue provision of manual techniques and modalities as needed.    Emilie Radford, PTA

## 2023-05-30 ENCOUNTER — TELEPHONE (OUTPATIENT)
Dept: OTOLARYNGOLOGY | Facility: CLINIC | Age: 38
End: 2023-05-30
Payer: MEDICAID

## 2023-06-02 ENCOUNTER — CLINICAL SUPPORT (OUTPATIENT)
Dept: REHABILITATION | Facility: HOSPITAL | Age: 38
End: 2023-06-02
Payer: MEDICAID

## 2023-06-02 DIAGNOSIS — Z74.09 IMPAIRED FUNCTIONAL MOBILITY AND ACTIVITY TOLERANCE: Primary | ICD-10-CM

## 2023-06-02 DIAGNOSIS — G89.29 CHRONIC MIDLINE LOW BACK PAIN WITH BILATERAL SCIATICA: ICD-10-CM

## 2023-06-02 DIAGNOSIS — M54.42 CHRONIC MIDLINE LOW BACK PAIN WITH BILATERAL SCIATICA: ICD-10-CM

## 2023-06-02 DIAGNOSIS — M79.606 LOW BACK PAIN RADIATING TO LOWER EXTREMITY: ICD-10-CM

## 2023-06-02 DIAGNOSIS — M53.86 DECREASED ROM OF LUMBAR SPINE: ICD-10-CM

## 2023-06-02 DIAGNOSIS — M54.50 LOW BACK PAIN RADIATING TO LOWER EXTREMITY: ICD-10-CM

## 2023-06-02 DIAGNOSIS — M54.41 CHRONIC MIDLINE LOW BACK PAIN WITH BILATERAL SCIATICA: ICD-10-CM

## 2023-06-02 PROCEDURE — 97110 THERAPEUTIC EXERCISES: CPT | Mod: PN,CQ

## 2023-06-02 PROCEDURE — 97140 MANUAL THERAPY 1/> REGIONS: CPT | Mod: PN,CQ

## 2023-06-02 PROCEDURE — 97014 ELECTRIC STIMULATION THERAPY: CPT | Mod: PN,CQ

## 2023-06-02 NOTE — PROGRESS NOTES
"[facility]   Physical Therapy Treatment Note     Name: Corina Liu  Clinic Number: [id number]    Therapy Diagnosis:   Encounter Diagnoses   Name Primary?    Impaired functional mobility and activity tolerance Yes    Decreased ROM of lumbar spine     Chronic midline low back pain with bilateral sciatica     Low back pain radiating to lower extremity      Physician: Melva Brito MD    Visit Date: 6/2/2023    Physician Orders: PT Eval and Treat   Medical Diagnosis from Referral: Chronic midline low back pain with bilateral sciatica; Low back pain radiating to lower extremity   Evaluation Date: 5/1/2023  Authorization Period Expiration: 04/18/2024   Plan of Care Expiration: 6/21/2023  Progress Note Due: 6/21/2023  Visit # / Visits authorized: 6/ 20 (7 total)   FOTO: Completed 5/29/2023 53% Residual Deficit  Next survey due week of 6/12/2023     Precautions: cancer     PTA Visit #: 2/5     Time In: 1540  Time Out: 1640  Total Billable Time: 58 minutes (15' of which heat w/ESTIM)    SUBJECTIVE     Pt reports: "I am hurting after sitting in the hospital waiting room, so much after my daughter having surgery."  She was somewhat compliant with home exercise program.  Response to previous treatment: Not stated  Functional change: N/A at this time    Pain: 8/10  Location: bilateral back and LLE    OBJECTIVE     Objective Measures updated at progress report unless specified.     Treatment     Corina received the treatments listed below:      therapeutic exercises to develop strength, endurance, ROM, flexibility, posture, and core stabilization for 33 minutes including:   Recumbent bike lvl3 x6'  Seated hamstring stretch 3x30s ea  Piriformis stretch 3x30s ea  Thoracolumbar AROM 10x3s   Red Theraband resisted Upper trunk rotation x15   Side-bending   Flexion   Extension  Supine hip flexor stretch 3x30s ea   Lower trunk rotation using physioball 10x3s   DKTC w/physioball 10x3s    Did not " perform on this date:  Standing gastroc stretch 3x30s   L LE: x15 ea (increase)  Hip abduction  Hip extension  Hamstring curl  March  Red Tband postural strengthening x15 ea   Rows w/scapular retractions   B Extension w/scapular retractions   B External rotation w/scapular retractions  Pelvic isometric w/Therabar 5x5s ea   Core reverse crunch isometric w/physioball x10   Posterior pelvic tilt 20x3s    To be added:   Core strengthening (dying bug physioball isometric & progressing alt arm/leg lift), increased posture, multifidus walkouts, prone extension & prone press ups, increased L LE strengthening    manual therapy techniques: Soft tissue Mobilization were applied to the: lumbar paraspinals for 10 minutes, including:  Strumming & Triggerpoint release  Kinesiology tape applied in I strip anchored over L SIJ with no tension. Patient flexed forward and rotated R. I strip applied distal to proximal along paraspinals with 75% tension, terminating over T8-9 paraspinals with no tension.  Rubbed tape to activate adhesive. Patient returned upright.  Repeated process on R.    supervised modalities after being cleared for contradictions: Pre-modulated Electrical Stimulation:  Corina received Pre-modulated Electrical Stimulation for pain control applied to the paraspinals in two different channels on either side of the lumbar spine. Pt received stimulation at 100% scan at a frequency of 4000Hz for 15 minutes. Corina tolerated treatment in prone lying well without any adverse effects.      hot pack in conjunction with ESTIM modalities to lumbar region for pain reduction and soft tissue relaxation.    Patient Education and Home Exercises     Home Exercises Provided and Patient Education Provided     Education provided:   - The patient was slightly progressed with increased exercise complexity as stated above in bold print.    Written Home Exercises Provided: Patient instructed to cont prior HEP. Exercises were reviewed and  Corina was able to demonstrate them prior to the end of the session.  Corina demonstrated fair  understanding of the education provided. See EMR under Patient Instructions for exercises provided during therapy sessions.    ASSESSMENT     The patient reported to PT with increased pain today following waiting at hospital due to family member having surgery. Due to this the exercise program was modified on today's date, increasing slightly with exercise complexity in some areas, while reducing in others. Manual techniques were provided, noted present trigger-points in bilateral thoracolumbar paraspinals. Modalities were provided to assist in pain reduction. Kinesiology tape was applied and education provided on care.    Corina Is progressing slowly towards her goals.   Pt prognosis is Fair.     Pt will continue to benefit from skilled outpatient physical therapy to address the deficits listed in the problem list box on initial evaluation, provide pt/family education and to maximize pt's level of independence in the home and community environment.     Pt's spiritual, cultural and educational needs considered and pt agreeable to plan of care and goals.     Anticipated barriers to physical therapy: Significant grief, long term presence of symptoms.      Goals:   Short Term Goals: 3 weeks   1) Decrease max pain to < 8/10 Slowly progressing  2) Facilitate improved LE flexibility Gradually progressing  3) Facilitate improved Posture Minimal progress  4) Patient will perform sit > stand transfer after sitting > 30 min with minimal UE support 5 of 10 trials Minimal progress     Long Term Goals: 6 weeks   1) Increase walking tolerance to > 30 min Minimal progress              2) Patient will sit > 45 min without increased LE paresthesias Ongoing              3) Patient will bend to perform LE dressing without increased back/LE pain Minimal progress              4) Patient will sweep/mop > 20 min without increased back/LE  pain Minimal progress              5) Improve functional deficit to < 40% Ongoing              6) Patient will be independent in HEP for flexibility, trunk/lumbar ROM, core strength, and postural correction Gradually progressing    PLAN     POC: Outpatient Physical Therapy 2 times weekly for 6 weeks to include the following interventions: Electrical Stimulation IFC/TENS, Manual Therapy, Moist Heat/ Ice, Patient Education, Therapeutic Exercise, and Functional Dry Needling.     The patient is to be progressed within the established plan of care as tolerated in order to accomplish goals as stated above. Plan to increase core strengthening (adding dying bug physioball isometric & progressing alt arm/leg lift) and exercise complexity (increasing core & lower extremity strengthening), and incorporate multifidus walkouts and mini squats, as well as prone press up. Trial of dry needling with PT. Continue provision of manual techniques and modalities as needed.    Emilie Radford, PTA

## 2023-06-05 ENCOUNTER — CLINICAL SUPPORT (OUTPATIENT)
Dept: REHABILITATION | Facility: HOSPITAL | Age: 38
End: 2023-06-05
Payer: MEDICAID

## 2023-06-05 DIAGNOSIS — M54.41 CHRONIC MIDLINE LOW BACK PAIN WITH BILATERAL SCIATICA: ICD-10-CM

## 2023-06-05 DIAGNOSIS — M54.42 CHRONIC MIDLINE LOW BACK PAIN WITH BILATERAL SCIATICA: ICD-10-CM

## 2023-06-05 DIAGNOSIS — M79.606 LOW BACK PAIN RADIATING TO LOWER EXTREMITY: ICD-10-CM

## 2023-06-05 DIAGNOSIS — Z74.09 IMPAIRED FUNCTIONAL MOBILITY AND ACTIVITY TOLERANCE: Primary | ICD-10-CM

## 2023-06-05 DIAGNOSIS — M53.86 DECREASED ROM OF LUMBAR SPINE: ICD-10-CM

## 2023-06-05 DIAGNOSIS — M54.50 LOW BACK PAIN RADIATING TO LOWER EXTREMITY: ICD-10-CM

## 2023-06-05 DIAGNOSIS — G89.29 CHRONIC MIDLINE LOW BACK PAIN WITH BILATERAL SCIATICA: ICD-10-CM

## 2023-06-05 PROCEDURE — 97014 ELECTRIC STIMULATION THERAPY: CPT | Mod: PN,CQ

## 2023-06-05 PROCEDURE — 97140 MANUAL THERAPY 1/> REGIONS: CPT | Mod: PN,CQ

## 2023-06-05 PROCEDURE — 97110 THERAPEUTIC EXERCISES: CPT | Mod: PN,CQ

## 2023-06-05 NOTE — PROGRESS NOTES
"[facility]   Physical Therapy Treatment Note     Name: Corina Liu  Clinic Number: [id number]    Therapy Diagnosis:   Encounter Diagnoses   Name Primary?    Impaired functional mobility and activity tolerance Yes    Decreased ROM of lumbar spine     Chronic midline low back pain with bilateral sciatica     Low back pain radiating to lower extremity      Physician: Melva Brito MD    Visit Date: 6/5/2023    Physician Orders: PT Eval and Treat   Medical Diagnosis from Referral: Chronic midline low back pain with bilateral sciatica; Low back pain radiating to lower extremity   Evaluation Date: 5/1/2023  Authorization Period Expiration: 04/18/2024   Plan of Care Expiration: 6/21/2023  Progress Note Due: 6/21/2023  Visit # / Visits authorized: 7/ 20 (8 total)   FOTO: Completed 5/29/2023 53% Residual Deficit  Next survey due week of 6/12/2023     Precautions: cancer     PTA Visit #: 3/5     Time In: 1345  Time Out: 1440  Total Billable Time: 55 minutes (15' of which heat w/ESTIM)    SUBJECTIVE     Pt reports: "Since they changed my medicine, the pain medicine is not lasting as long. I don't want to have to rely on it, but I can't get out of bed. I also have an abscess down there that is making it uncomfortable to sit."  She was somewhat compliant with home exercise program.  Response to previous treatment: "still bad"  Functional change: N/A at this time    Pain: 9/10  Location: bilateral back and LLE    OBJECTIVE     Objective Measures updated at progress report unless specified.    Treatment     Corina received the treatments listed below:      therapeutic exercises to develop strength, endurance, ROM, flexibility, posture, and core stabilization for 30 minutes including:   Recumbent bike lvl3 x6' (increase)   Multifidus walkouts w/red theraband x3 laps ea  Supine Pelvic isometric w/Therabar 5x5s ea  Lower trunk rotation using physioball 10x3s   DKTC w/physioball 10x3s  Dying " bug physioball isometric x10 ea   Core reverse crunch isometric w/physioball x10    Did not perform on this date: (can continue)  Seated hamstring stretch 3x30s ea  Piriformis stretch 3x30s ea  Thoracolumbar AROM 10x3s   Red Theraband resisted Upper trunk rotation x15   Side-bending   Flexion   Extension  Supine hip flexor stretch 3x30s ea  Standing gastroc stretch 3x30s   L LE: x15 ea (increase)  Hip abduction  Hip extension  Hamstring curl  March  Red Tband postural strengthening x15 ea   Rows w/scapular retractions   B Extension w/scapular retractions   B External rotation w/scapular retractions  Posterior pelvic tilt 20x3s    To be added:   Core strengthening (progressing + alt arm/leg lift), increased posture, prone extension & prone press ups, increased L LE strengthening    manual therapy techniques: Soft tissue Mobilization were applied to the: lumbar paraspinals and bilateral piriformii for 10 minutes, including:  Strumming & Triggerpoint release  Anterior-posterior and posterior-anterior IS mobilizations    supervised modalities after being cleared for contradictions: Pre-modulated Electrical Stimulation:  Corina received Pre-modulated Electrical Stimulation for pain control applied to the paraspinals in two different channels on either side of the lumbosacral region. Patient prone with half bolster underneath abdomen/ASIS. Pt received stimulation at 100% scan at a frequency of 4000Hz for 15 minutes. Corina tolerated treatment in prone lying well without any adverse effects.      hot pack in conjunction with ESTIM modalities to lumbosacral region for pain reduction and soft tissue relaxation.    Patient Education and Home Exercises     Home Exercises Provided and Patient Education Provided     Education provided:   - The patient was instructed in additional therapeutic exercise and provided education on anatomy in relation to diagnosis.    Written Home Exercises Provided: Patient instructed to cont prior  "HEP. Exercises were reviewed and Corina was able to demonstrate them prior to the end of the session.  Corina demonstrated fair  understanding of the education provided. See EMR under Patient Instructions for exercises provided during therapy sessions.    ASSESSMENT     The patient reported to PT today stating increased pain from repotting and planting plants at home. Corina Liu was slightly progressed with additional therapeutic exercise as stated above. Treatment was modified again due to tardiness, increased pain, and increased pain in sitting due to report of abscess. Patient reports "sciatic pain" causing "cold toes." Manual techniques were provided to assist in iliosacral mobility. Increased tension noted in bilateral piriformii with sensitivity noted. Due to increased pain, modalities were requested and modified to assist in iliosacral region pain as well.    Corina Is progressing slowly towards her goals.   Pt prognosis is Fair.     Pt will continue to benefit from skilled outpatient physical therapy to address the deficits listed in the problem list box on initial evaluation, provide pt/family education and to maximize pt's level of independence in the home and community environment.     Pt's spiritual, cultural and educational needs considered and pt agreeable to plan of care and goals.     Anticipated barriers to physical therapy: Significant grief, long term presence of symptoms.      Goals:   Short Term Goals: 3 weeks   1) Decrease max pain to < 8/10 Regressed on this date  2) Facilitate improved LE flexibility Gradually progressing  3) Facilitate improved Posture Minimal progress  4) Patient will perform sit > stand transfer after sitting > 30 min with minimal UE support 5 of 10 trials Minimal progress     Long Term Goals: 6 weeks   1) Increase walking tolerance to > 30 min Minimal progress              2) Patient will sit > 45 min without increased LE paresthesias Ongoing              3) " Patient will bend to perform LE dressing without increased back/LE pain Minimal progress              4) Patient will sweep/mop > 20 min without increased back/LE pain Minimal progress              5) Improve functional deficit to < 40% Ongoing              6) Patient will be independent in HEP for flexibility, trunk/lumbar ROM, core strength, and postural correction Gradually progressing    PLAN     POC: Outpatient Physical Therapy 2 times weekly for 6 weeks to include the following interventions: Electrical Stimulation IFC/TENS, Manual Therapy, Moist Heat/ Ice, Patient Education, Therapeutic Exercise, and Functional Dry Needling.     The patient is to be progressed within the established plan of care as tolerated in order to accomplish goals as stated above. Plan to increase core strengthening (progressing + alt arm/leg lift) and exercise complexity (increasing core & lower extremity strengthening), and incorporate mini squats, as well as prone press up. Trial of dry needling with PT. Continue provision of manual techniques and modalities as needed.    Emilie Radford, PTA

## 2023-06-06 ENCOUNTER — TELEPHONE (OUTPATIENT)
Dept: OTOLARYNGOLOGY | Facility: CLINIC | Age: 38
End: 2023-06-06
Payer: MEDICAID

## 2023-06-07 ENCOUNTER — OFFICE VISIT (OUTPATIENT)
Dept: OTOLARYNGOLOGY | Facility: CLINIC | Age: 38
End: 2023-06-07
Payer: MEDICAID

## 2023-06-07 VITALS — DIASTOLIC BLOOD PRESSURE: 65 MMHG | SYSTOLIC BLOOD PRESSURE: 105 MMHG

## 2023-06-07 DIAGNOSIS — R49.0 HOARSENESS: ICD-10-CM

## 2023-06-07 DIAGNOSIS — H69.91 EUSTACHIAN TUBE DYSFUNCTION, RIGHT: Primary | ICD-10-CM

## 2023-06-07 PROCEDURE — 99213 OFFICE O/P EST LOW 20 MIN: CPT | Mod: S$PBB,,, | Performed by: OTOLARYNGOLOGY

## 2023-06-07 PROCEDURE — 3078F DIAST BP <80 MM HG: CPT | Mod: CPTII,,, | Performed by: OTOLARYNGOLOGY

## 2023-06-07 PROCEDURE — 99213 OFFICE O/P EST LOW 20 MIN: CPT | Mod: PBBFAC,PN | Performed by: OTOLARYNGOLOGY

## 2023-06-07 PROCEDURE — 3074F SYST BP LT 130 MM HG: CPT | Mod: CPTII,,, | Performed by: OTOLARYNGOLOGY

## 2023-06-07 PROCEDURE — 99213 PR OFFICE/OUTPT VISIT, EST, LEVL III, 20-29 MIN: ICD-10-PCS | Mod: S$PBB,,, | Performed by: OTOLARYNGOLOGY

## 2023-06-07 PROCEDURE — 99999 PR PBB SHADOW E&M-EST. PATIENT-LVL III: ICD-10-PCS | Mod: PBBFAC,,, | Performed by: OTOLARYNGOLOGY

## 2023-06-07 PROCEDURE — 3074F PR MOST RECENT SYSTOLIC BLOOD PRESSURE < 130 MM HG: ICD-10-PCS | Mod: CPTII,,, | Performed by: OTOLARYNGOLOGY

## 2023-06-07 PROCEDURE — 3078F PR MOST RECENT DIASTOLIC BLOOD PRESSURE < 80 MM HG: ICD-10-PCS | Mod: CPTII,,, | Performed by: OTOLARYNGOLOGY

## 2023-06-07 PROCEDURE — 1159F MED LIST DOCD IN RCRD: CPT | Mod: CPTII,,, | Performed by: OTOLARYNGOLOGY

## 2023-06-07 PROCEDURE — 1159F PR MEDICATION LIST DOCUMENTED IN MEDICAL RECORD: ICD-10-PCS | Mod: CPTII,,, | Performed by: OTOLARYNGOLOGY

## 2023-06-07 PROCEDURE — 99999 PR PBB SHADOW E&M-EST. PATIENT-LVL III: CPT | Mod: PBBFAC,,, | Performed by: OTOLARYNGOLOGY

## 2023-06-07 RX ORDER — FLUTICASONE PROPIONATE 50 MCG
2 SPRAY, SUSPENSION (ML) NASAL NIGHTLY
Qty: 15.8 ML | Refills: 11 | Status: SHIPPED | OUTPATIENT
Start: 2023-06-07 | End: 2024-06-06

## 2023-06-07 RX ORDER — PSEUDOEPHEDRINE HCL 120 MG/1
120 TABLET, FILM COATED, EXTENDED RELEASE ORAL EVERY MORNING
Qty: 30 TABLET | Refills: 0 | Status: SHIPPED | OUTPATIENT
Start: 2023-06-07 | End: 2023-07-07

## 2023-06-07 NOTE — PROGRESS NOTES
Subjective:       Patient ID: Corina Liu is a 37 y.o. female.    Chief Complaint: Follow-up (Pt is here to fu on hoarseness and recurrent ear infections )      I saw this patient about two months ago she had a couple of issues one with some hoarseness and the other was her right ear was popping and bothering her and had some discomfort.  I did a scope exam on her and her nasopharynx was free of any tumors or growths and her larynx had some redness suggestive of reflux I put her on b.i.d. PPIs she seemed to get some benefit from that regarding her hoarseness but there has been a small gap in her treatment because the pharmacy had some problem with the refill but anyway she is picked that up in his about the reinstitute b.i.d. PPIs and did see a benefit but she is still pinches and pops her ear she gets a squealing noise and while she will have some days that it seems okay it frequently will bother her for all day on the right.    Follow-up        Objective:      ENT Physical Exam  Constitutional  Appearance: patient appears well-developed, well-nourished and well-groomed,  Communication/Voice: communication appropriate for developmental age; vocal quality normal;  Head and Face  Appearance: head appears normal, face appears normal and face appears atraumatic;  Salivary: glands normal;  Ear  Hearing: intact;  Auricles: right auricle normal; left auricle normal;  Ear Canals: right ear canal normal; left ear canal normal;  Tympanic Membranes: right tympanic membrane normal; left tympanic membrane normal;  Nose  External Nose: nares patent bilaterally; external nose normal;  Internal Nose: nasal mucosa normal; septum normal; bilateral inferior turbinates normal;  Oral Cavity/Oropharynx  Lips: normal;  Teeth: normal;  Gums: gingiva normal;  Tongue: normal;  Oral mucosa: normal;  Hard palate: normal;  Soft palate: normal;  Tonsils: normal;  Base of Tongue: normal;  Posterior pharyngeal wall:  normal;  Neck  Neck: neck normal; neck palpation normal;  Thyroid: thyroid normal;  Lymphatic  Palpation: lymph nodes normal;        Assessment:       1. Eustachian tube dysfunction, right    2. Hoarseness         Plan:          As before her right ear looks okay but it does have obviously a problem equalizing it makes a squealing noise when she pinches and pops and she is able to do that in the office she is going to double up on the Flonase on the right, she tried the Astepro but found it too bad tasting to tolerate, and some pseudoephedrine for flare-ups and we also discussed the possibility that eustachian tube dilation could be helpful for this although it is not an official indication for nonspecific eustachian tube dysfunction, it is however helpful for patients who can not equalize on an airplane or scuba dive and this would be a roughly similar situation.

## 2023-06-08 ENCOUNTER — TELEPHONE (OUTPATIENT)
Dept: OTOLARYNGOLOGY | Facility: CLINIC | Age: 38
End: 2023-06-08
Payer: MEDICAID

## 2023-06-08 NOTE — TELEPHONE ENCOUNTER
Spoke with provider to make sure its Ok for pharmacist to proceed with giving the pt the rx. Provider okay rx verbally.    Spoke with pt, no concerns post inj.  FUV made

## 2023-06-08 NOTE — TELEPHONE ENCOUNTER
Ms. 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  She has been on Copaxone since her diagnosis, initially the 20 mg/day dose, was switched to the 40 mg 3 times a week formulation.  Today we reviewed her MRI imaging verbal through MRI imaging.  We compared her MRIs from February 2009 to the most recent scans performed in April 2016.  There has been no change.  She initially had a T7-8 lesion which is no longer visible.  She also has a lesion at C3 and C4.  She has minimal brain involvement.  I recommended since she has been responding so beautifully to Copaxone, that she continue with the injections as long as tolerable.  We did agree to check a new MRI imaging so that we can assess its effectiveness over the past 3 years.    Plan:  1.  New MRI imaging of the brain, cervical, and thoracic spine ordered with and without contrast.  2.  Continue Copaxone 40 mg subcutaneous injections.   3.  New start form filled out for Copaxone to switch me over as the prescriber.   Pt is on vyvanse, xanax, and percocet pharmacist states pt narcotic score is to high. Please advise

## 2023-06-08 NOTE — TELEPHONE ENCOUNTER
----- Message from Kelly Mitul sent at 6/7/2023 11:41 AM CDT -----  Contact: NAKIA DÍAZ  Type:  Pharmacy Calling to Clarify an RX    Name of Caller:BRE  Pharmacy Name: [facility]  Prescription Name: pseudoephedrine (SUDAFED) 120 mg 12 hr tablet  What do they need to clarify?: PT NARCOTIC SCORE IS HIGH DUE TO OTHER RX'S FROM A DIFFERENT PROVIDER   Best Call Back Number: [phone] / [phone]  Additional Information: THANK YOU

## 2023-06-09 ENCOUNTER — CLINICAL SUPPORT (OUTPATIENT)
Dept: REHABILITATION | Facility: HOSPITAL | Age: 38
End: 2023-06-09
Payer: MEDICAID

## 2023-06-09 ENCOUNTER — PATIENT MESSAGE (OUTPATIENT)
Dept: FAMILY MEDICINE | Facility: CLINIC | Age: 38
End: 2023-06-09
Payer: MEDICAID

## 2023-06-09 DIAGNOSIS — G89.29 CHRONIC MIDLINE LOW BACK PAIN WITH BILATERAL SCIATICA: ICD-10-CM

## 2023-06-09 DIAGNOSIS — M54.42 CHRONIC MIDLINE LOW BACK PAIN WITH BILATERAL SCIATICA: ICD-10-CM

## 2023-06-09 DIAGNOSIS — M79.606 LOW BACK PAIN RADIATING TO LOWER EXTREMITY: ICD-10-CM

## 2023-06-09 DIAGNOSIS — Z74.09 IMPAIRED FUNCTIONAL MOBILITY AND ACTIVITY TOLERANCE: Primary | ICD-10-CM

## 2023-06-09 DIAGNOSIS — M53.86 DECREASED ROM OF LUMBAR SPINE: ICD-10-CM

## 2023-06-09 DIAGNOSIS — M54.50 LOW BACK PAIN RADIATING TO LOWER EXTREMITY: ICD-10-CM

## 2023-06-09 DIAGNOSIS — M54.41 CHRONIC MIDLINE LOW BACK PAIN WITH BILATERAL SCIATICA: ICD-10-CM

## 2023-06-09 PROCEDURE — 97014 ELECTRIC STIMULATION THERAPY: CPT | Mod: PN

## 2023-06-09 PROCEDURE — 97110 THERAPEUTIC EXERCISES: CPT | Mod: PN

## 2023-06-09 PROCEDURE — 97140 MANUAL THERAPY 1/> REGIONS: CPT | Mod: PN

## 2023-06-09 NOTE — PROGRESS NOTES
"[facility]   Physical Therapy Treatment Note     Name: Corina Liu  Clinic Number: [id number]    Therapy Diagnosis:   Encounter Diagnoses   Name Primary?    Impaired functional mobility and activity tolerance Yes    Decreased ROM of lumbar spine     Chronic midline low back pain with bilateral sciatica     Low back pain radiating to lower extremity        Physician: Melva Brito MD    Visit Date: 6/9/2023    Physician Orders: PT Eval and Treat   Medical Diagnosis from Referral: Chronic midline low back pain with bilateral sciatica; Low back pain radiating to lower extremity   Evaluation Date: 5/1/2023  Authorization Period Expiration: 04/18/2024   Plan of Care Expiration: 6/21/2023  Progress Note Due: 6/21/2023  Visit # / Visits authorized: 8/ 20 (9 total)   FOTO: Completed 5/29/2023 53% Residual Deficit  Next survey due week of 6/12/2023     Precautions: cancer     PTA Visit #: 0/5     Time In: 1445  Time Out: 1544  Total Billable Time: 55 minutes     SUBJECTIVE     Pt reports: "It's getting worse."  She was somewhat compliant with home exercise program.  Response to previous treatment: "It still hurts"  Functional change: N/A at this time    Pain: 8/10  Location: bilateral back and LLE    OBJECTIVE     Objective Measures updated at progress report unless specified.    Treatment     Corina received the treatments listed below:      therapeutic exercises to develop strength, endurance, ROM, flexibility, posture, and core stabilization for 28 minutes including:   Recumbent bike lvl3 x6' (increase)   Multifidus walkouts w/red theraband x3 laps ea  Red Tband postural strengthening x15 ea  Rows w/scapular retractions  B Extension w/scapular retractions  B External rotation w/scapular retractions  Seated hamstring stretch 3x30s ea  Piriformis stretch 3x30s ea    Did not perform on this date: (can continue)  Thoracolumbar AROM 10x3s   Red Theraband resisted Upper trunk rotation " x15   Side-bending   Flexion   Extension  Supine hip flexor stretch 3x30s ea  Standing gastroc stretch 3x30s   L LE: x15 ea (increase)  Hip abduction  Hip extension  Hamstring curl  March  Posterior pelvic tilt 20x3s  Supine Pelvic isometric w/Therabar 5x5s ea  Lower trunk rotation using physioball 10x3s   DKTC w/physioball 10x3s  Dying bug physioball isometric x10 ea   Core reverse crunch isometric w/physioball x10    manual therapy techniques: Soft tissue Mobilization were applied to the: lumbar paraspinals and bilateral piriformii for 10 minutes, including:  Strumming & Triggerpoint release  Anterior-posterior and posterior-anterior IS mobilizations    supervised modalities after being cleared for contradictions: Pre-modulated Electrical Stimulation:  Corina received Pre-modulated Electrical Stimulation for pain control applied to the paraspinals in two different channels on either side of the lumbosacral region. Patient prone with half bolster underneath abdomen/ASIS. Pt received stimulation at 100% scan at a frequency of 4000Hz for 15 minutes. Corina tolerated treatment in prone lying well without any adverse effects.      hot pack in conjunction with ESTIM modalities to lumbosacral region for pain reduction and soft tissue relaxation.    To be added:   Core strengthening (progressing + alt arm/leg lift), increased posture, prone extension & prone press ups, increased L LE strengthening    Patient Education and Home Exercises     Home Exercises Provided and Patient Education Provided     Education provided:   - Review of HEP and need for continued compliance discussed.    Written Home Exercises Provided: Patient instructed to cont prior HEP. Exercises were reviewed and Corina was able to demonstrate them prior to the end of the session.  Corina demonstrated fair  understanding of the education provided. See EMR under Patient Instructions for exercises provided during therapy sessions.    ASSESSMENT     The  patient reported to PT today stating that she felt as though her pain has been getting worse.  She was instructed in therapeutic exercise to increase LE ROM and trunk strength with no adverse reactions.  Dry needling initiated.    Corina Is progressing slowly towards her goals.   Pt prognosis is Fair.     Pt will continue to benefit from skilled outpatient physical therapy to address the deficits listed in the problem list box on initial evaluation, provide pt/family education and to maximize pt's level of independence in the home and community environment.     Pt's spiritual, cultural and educational needs considered and pt agreeable to plan of care and goals.     Anticipated barriers to physical therapy: Significant grief, long term presence of symptoms.      Goals:   Short Term Goals: 3 weeks   1) Decrease max pain to < 8/10 On this date 8/10  2) Facilitate improved LE flexibility Gradually progressing  3) Facilitate improved Posture Minimal progress  4) Patient will perform sit > stand transfer after sitting > 30 min with minimal UE support 5 of 10 trials Minimal progress     Long Term Goals: 6 weeks   1) Increase walking tolerance to > 30 min Minimal progress              2) Patient will sit > 45 min without increased LE paresthesias Ongoing              3) Patient will bend to perform LE dressing without increased back/LE pain Minimal progress              4) Patient will sweep/mop > 20 min without increased back/LE pain Minimal progress              5) Improve functional deficit to < 40% Ongoing              6) Patient will be independent in HEP for flexibility, trunk/lumbar ROM, core strength, and postural correction Gradually progressing    PLAN     POC: Outpatient Physical Therapy 2 times weekly for 6 weeks to include the following interventions: Electrical Stimulation IFC/TENS, Manual Therapy, Moist Heat/ Ice, Patient Education, Therapeutic Exercise, and Functional Dry Needling.     The patient is to be  progressed within the established plan of care as tolerated in order to accomplish goals as stated above. Plan to increase core strengthening (progressing + alt arm/leg lift) and exercise complexity (increasing core & lower extremity strengthening), and incorporate mini squats, as well as prone press up. Manual techniques and modalities will be utilized as needed.    Analy Modi, SPT    Session transitioned to PT for Functional Dry Needling at 1515:   manual therapy techniques: functional dry needling were applied to the: lower back for 13 minutes, including:  Informed consent obtained after thorough explanation of risks and benefits. Signed consent form will be scanned into medical records. Pre-procedure time out performed which included verification of name, , and site of treatment. Cleaned target tissues with 70% isopropyl alcohol wipe down and performed with single use sterile prep pads.   Functional dry needling to B lower back performed as follows:  1) 50 mm needle inserted 2 finger breadths lateral (on L) to lower border of midline of L3 spinous process using 45* anteromedial and slightly caudad angulation to lumbar multifidi/rotatores  2) 50 mm needle inserted 2 finger breadths lateral ( on R) to lower border of midline of L4 spinous process using 45* anteromedial and slightly caudad angulation to lumbar multifidi/rotatores  3) 50 mm needle inserted 2 finger breadths lateral (on L) to lower border of midline of L5 spinous process using 45* anteromedial and slightly caudad angulation to lumbar multifidi/rotatores  4) 50 mm needle inserted 1 finger breadth lateral (R) to the middle of the tip of L3 spinous process using posterior to anterior angulation (longissimus lumborum and multifidi)  5) 50 mm needle inserted 1 finger breadth lateral (L) to the middle of the tip of L4 spinous process using posterior to anterior angulation (longissimus lumborum and multifidi)  6) 50 mm needle inserted 1 finger breadth  lateral ( R) to the middle of the tip of L5 spinous process using posterior to anterior angulation (longissimus lumborum and multifidi)  7) 30 mm needle inserted in midline below L2 spinous process with anterosuperior angulation (interspinous ligament)  8) 30 mm needle inserted in midline below L4 spinous process with anterosuperior angulation (interspinous ligament)  9) 75 mm needle inserted approximately 2 finger breadths inferior to the upper middle aspect of the R and the L iliac crest angled perpendicular to the ilium into the gluteus medius trigger point  10) 75 mm needle inserted approximately 2 finger breadths inferior to the upper lateral aspect of the R and L iliac crest angled perpendicular to the ilium (anteriomedial to skin surface) into the gluteus medius trigger point                               Following insertion needles were wound unidirectionally.  Treatment performed in prone. ES with FDN x 15 at  MHz wave frequency (using 3 channels on R and 2 on L). Intensity set to patient tolerance. Patient provided with call bell. Needles left in situ for 15 minutes. Patient instructed in increasing hydration habits following for decreased soreness. No adverse effects present following needling. Patient reported no significant change in symptoms following session

## 2023-06-12 ENCOUNTER — CLINICAL SUPPORT (OUTPATIENT)
Dept: REHABILITATION | Facility: HOSPITAL | Age: 38
End: 2023-06-12
Payer: MEDICAID

## 2023-06-12 DIAGNOSIS — G89.29 CHRONIC MIDLINE LOW BACK PAIN WITH BILATERAL SCIATICA: ICD-10-CM

## 2023-06-12 DIAGNOSIS — M79.606 LOW BACK PAIN RADIATING TO LOWER EXTREMITY: ICD-10-CM

## 2023-06-12 DIAGNOSIS — M54.50 LOW BACK PAIN RADIATING TO LOWER EXTREMITY: ICD-10-CM

## 2023-06-12 DIAGNOSIS — M54.42 CHRONIC MIDLINE LOW BACK PAIN WITH BILATERAL SCIATICA: ICD-10-CM

## 2023-06-12 DIAGNOSIS — M53.86 DECREASED ROM OF LUMBAR SPINE: ICD-10-CM

## 2023-06-12 DIAGNOSIS — M54.41 CHRONIC MIDLINE LOW BACK PAIN WITH BILATERAL SCIATICA: ICD-10-CM

## 2023-06-12 DIAGNOSIS — Z74.09 IMPAIRED FUNCTIONAL MOBILITY AND ACTIVITY TOLERANCE: Primary | ICD-10-CM

## 2023-06-12 PROCEDURE — 97014 ELECTRIC STIMULATION THERAPY: CPT | Mod: PN,CQ

## 2023-06-12 PROCEDURE — 97140 MANUAL THERAPY 1/> REGIONS: CPT | Mod: PN,CQ

## 2023-06-12 PROCEDURE — 97110 THERAPEUTIC EXERCISES: CPT | Mod: PN,CQ

## 2023-06-12 RX ORDER — OXYCODONE AND ACETAMINOPHEN 10; 325 MG/1; MG/1
1 TABLET ORAL EVERY 12 HOURS PRN
Qty: 10 TABLET | Refills: 0 | Status: SHIPPED | OUTPATIENT
Start: 2023-06-12 | End: 2023-06-26 | Stop reason: SDUPTHER

## 2023-06-12 NOTE — PROGRESS NOTES
"[facility]   Physical Therapy Treatment Note     Name: Corina Liu  Clinic Number: [id number]    Therapy Diagnosis:   Encounter Diagnoses   Name Primary?    Impaired functional mobility and activity tolerance Yes    Decreased ROM of lumbar spine     Chronic midline low back pain with bilateral sciatica     Low back pain radiating to lower extremity        Physician: Melva Brito MD    Visit Date: 6/12/2023    Physician Orders: PT Eval and Treat   Medical Diagnosis from Referral: Chronic midline low back pain with bilateral sciatica; Low back pain radiating to lower extremity   Evaluation Date: 5/1/2023  Authorization Period Expiration: 04/18/2024   Plan of Care Expiration: 6/21/2023  Progress Note Due: 6/21/2023  Visit # / Visits authorized: 9/ 20 (10 total)   FOTO: Completed 6/12/2023 60% Residual Deficit  Next survey due week of 6/30/2023     Precautions: cancer     PTA Visit #: 1/5     Time In: 1430  Time Out: 1545  Total Billable Time: 75 minutes (15' of w/ESTIM)    SUBJECTIVE     Pt reports: "Getting new flip flops helped when I walk a little bit. It really just depends on how far or how long I am walking. My hallway is bad though, and I don't know why."  She was somewhat compliant with home exercise program.  Response to previous treatment: "It felt like it helped some."  Functional change: N/A at this time    Pain: 6/10  Location: bilateral back and LLE    OBJECTIVE     Objective Measures updated at progress report unless specified.    Treatment     Corina received the treatments listed below:      therapeutic exercises to develop strength, endurance, ROM, flexibility, posture, and core stabilization for 50 minutes including:   Recumbent bike lvl3 x8'    Standing gastroc stretch 3x30s    Multifidus walkouts w/green theraband x3 laps ea   L LE strengthening w/1# :   Hip abduction x10 ea  Hip extension x10 ea  Hamstring curl x10 ea  March x18 ea  Mini squat to chair " x10  Seated hamstring stretch 3x30s ea  Piriformis stretch 3x30s ea  Thoracolumbar AROM 10x3s   Green Theraband resisted Upper trunk rotation x15   Side-bending   Flexion   Extension  Supine hip flexor stretch 3x30s ea  Lower trunk rotation using physioball 10x3s   DKTC w/physioball 10x3s   Dying bug physioball isometric x15 ea    Did not perform on this date: (can continue)  Green Theraband postural strengthening x15 ea  Rows w/scapular retractions  B Extension w/scapular retractions  B External rotation w/scapular retractions  Posterior pelvic tilt 20x3s  Pelvic isometric w/Therabar 5x5s ea   Core reverse crunch isometric w/physioball x10    To be added:   Core strengthening (progressing + alt arm/leg lift), increased posture, prone extension & prone press ups, increased L LE strengthening    manual therapy techniques: Soft tissue Mobilization were applied to the: lumbar paraspinals and bilateral piriformii for 10 minutes, including:  Strumming & Triggerpoint release  Anterior-posterior and posterior-anterior IS mobilizations    supervised modalities after being cleared for contradictions: Pre-modulated Electrical Stimulation:  Corina received Pre-modulated Electrical Stimulation for pain control applied to the paraspinals in two different channels on either side of the lumbosacral region. Patient prone with half bolster underneath abdomen/ASIS. Pt received stimulation at 100% scan at a frequency of 4000Hz for 15 minutes. Corina tolerated treatment in prone lying well without any adverse effects.      hot pack in conjunction with ESTIM modalities to lumbosacral region for pain reduction and soft tissue relaxation.    Patient Education and Home Exercises     Home Exercises Provided and Patient Education Provided     Education provided:   - The patient was instructed in increased exercise complexity as stated above in bold print. Reviewed upper trap, levator scap, and middle trap/rhomboid stretches with  patient.    Written Home Exercises Provided: Patient instructed to cont prior HEP. Exercises were reviewed and Corina was able to demonstrate them prior to the end of the session.  Corina demonstrated fair  understanding of the education provided. See EMR under Patient Instructions for exercises provided during therapy sessions.    ASSESSMENT     The patient reported to physical therapy with reduced back pain compared to previous two sessions, but reports increased pain in neck and upper back. Treatment resumed with instruction to assist with increased soft tissue mobility and manual techniques were provided to assist with soft tissue mobility as well. Corina Liu was progressed with increased exercise complexity and additional therapeutic exercise for strength and stability. Modalities were provided following manual techniques per request.    Corina Is progressing slowly towards her goals.   Pt prognosis is Fair.     Pt will continue to benefit from skilled outpatient physical therapy to address the deficits listed in the problem list box on initial evaluation, provide pt/family education and to maximize pt's level of independence in the home and community environment.     Pt's spiritual, cultural and educational needs considered and pt agreeable to plan of care and goals.     Anticipated barriers to physical therapy: Significant grief, long term presence of symptoms.      Goals:   Short Term Goals: 3 weeks   1) Decrease max pain to < 8/10 Ongoing  2) Facilitate improved LE flexibility Gradually progressing  3) Facilitate improved Posture Gradually progressing  4) Patient will perform sit > stand transfer after sitting > 30 min with minimal UE support 5 of 10 trials Gradually progressing     Long Term Goals: 6 weeks   1) Increase walking tolerance to > 30 min Minimal progress              2) Patient will sit > 45 min without increased LE paresthesias Ongoing              3) Patient will bend to perform LE  dressing without increased back/LE pain Minimal progress              4) Patient will sweep/mop > 20 min without increased back/LE pain Minimal progress              5) Improve functional deficit to < 40% Ongoing              6) Patient will be independent in HEP for flexibility, trunk/lumbar ROM, core strength, and postural correction Gradually progressing    PLAN     POC: Outpatient Physical Therapy 2 times weekly for 6 weeks to include the following interventions: Electrical Stimulation IFC/TENS, Manual Therapy, Moist Heat/ Ice, Patient Education, Therapeutic Exercise, and Functional Dry Needling.     The patient is to be progressed within the established plan of care as tolerated in order to accomplish goals as stated above. Plan to increase core strengthening (progressing + alt arm/leg lift), increased posture, prone extension & prone press ups, increased L LE strengthening. Can continue provision of manual techniques, including but not limited to dry-needling with PT and modalities as needed. Re-iterate body mechanics for activities of daily living.    Emilie Radford, PTA

## 2023-06-21 DIAGNOSIS — F43.21 GRIEF: ICD-10-CM

## 2023-06-21 DIAGNOSIS — F90.2 ATTENTION DEFICIT HYPERACTIVITY DISORDER (ADHD), COMBINED TYPE: ICD-10-CM

## 2023-06-21 DIAGNOSIS — G47.00 INSOMNIA, UNSPECIFIED TYPE: ICD-10-CM

## 2023-06-21 DIAGNOSIS — Z79.899 ENCOUNTER FOR LONG-TERM CURRENT USE OF MEDICATION: ICD-10-CM

## 2023-06-21 DIAGNOSIS — F41.9 ANXIETY: ICD-10-CM

## 2023-06-21 NOTE — TELEPHONE ENCOUNTER
----- Message from Yuli Ruthie sent at 6/21/2023  9:01 AM CDT -----  Contact: pt  Type: Needs Medical Advice         Who Called: pt  Best Call Back Number:[phone]   Additional Information: Requesting a call back regarding  appt was cancelled for Monday due to provider out  and she said she was rescheduled for today but nothing is showing on her chart . Pt said she is needing to be seen today due to needing her RX refills needed. Pt was told she was set with Dr Chapman and upset. Pt said she can not wait until 07/26th when the appt shows on her mychart .   Please Advise- Thank you

## 2023-06-22 ENCOUNTER — CLINICAL SUPPORT (OUTPATIENT)
Dept: REHABILITATION | Facility: HOSPITAL | Age: 38
End: 2023-06-22
Attending: FAMILY MEDICINE
Payer: MEDICAID

## 2023-06-22 DIAGNOSIS — M54.50 LOW BACK PAIN RADIATING TO LOWER EXTREMITY: ICD-10-CM

## 2023-06-22 DIAGNOSIS — M54.41 CHRONIC MIDLINE LOW BACK PAIN WITH BILATERAL SCIATICA: ICD-10-CM

## 2023-06-22 DIAGNOSIS — Z74.09 IMPAIRED FUNCTIONAL MOBILITY AND ACTIVITY TOLERANCE: Primary | ICD-10-CM

## 2023-06-22 DIAGNOSIS — M53.86 DECREASED ROM OF LUMBAR SPINE: ICD-10-CM

## 2023-06-22 DIAGNOSIS — G89.29 CHRONIC MIDLINE LOW BACK PAIN WITH BILATERAL SCIATICA: ICD-10-CM

## 2023-06-22 DIAGNOSIS — M54.42 CHRONIC MIDLINE LOW BACK PAIN WITH BILATERAL SCIATICA: ICD-10-CM

## 2023-06-22 DIAGNOSIS — M79.606 LOW BACK PAIN RADIATING TO LOWER EXTREMITY: ICD-10-CM

## 2023-06-22 PROCEDURE — 97110 THERAPEUTIC EXERCISES: CPT | Mod: PN

## 2023-06-22 PROCEDURE — 97112 NEUROMUSCULAR REEDUCATION: CPT | Mod: PN

## 2023-06-22 PROCEDURE — 97140 MANUAL THERAPY 1/> REGIONS: CPT | Mod: PN

## 2023-06-22 RX ORDER — LISDEXAMFETAMINE DIMESYLATE 60 MG/1
60 CAPSULE ORAL EVERY MORNING
Qty: 30 CAPSULE | Refills: 0 | Status: SHIPPED | OUTPATIENT
Start: 2023-06-22 | End: 2023-08-06 | Stop reason: SDUPTHER

## 2023-06-22 RX ORDER — ALPRAZOLAM 0.5 MG/1
0.5 TABLET ORAL 3 TIMES DAILY PRN
Qty: 30 TABLET | Refills: 0 | Status: SHIPPED | OUTPATIENT
Start: 2023-06-22 | End: 2023-07-20 | Stop reason: SDUPTHER

## 2023-06-22 NOTE — PLAN OF CARE
"[facility]  Physical Therapy Plan of Care Note     Name: Corina Liu  Clinic Number: [id number]    Therapy Diagnosis:   Encounter Diagnoses   Name Primary?    Impaired functional mobility and activity tolerance Yes    Decreased ROM of lumbar spine     Chronic midline low back pain with bilateral sciatica     Low back pain radiating to lower extremity      Physician: Melva Brito MD    Visit Date: 6/22/2023    Physician Orders: PT Eval and Treat   Medical Diagnosis from Referral: Chronic midline low back pain with bilateral sciatica; Low back pain radiating to lower extremity   Evaluation Date: 5/1/2023  Authorization Period Expiration: 04/18/2024   Plan of Care Expiration: 8/4/2023  Progress Note Due: 8/4/2023  Visit # / Visits authorized: 10/ 20 (11 total)   FOTO: Completed 6/12/2023 60% Residual Deficit  Next survey due week of 6/30/2023     Precautions: cancer   Functional Level Prior to Evaluation:   Difficulty with walking for "long period" (~ 15-20 minutes), unable to sweep/mop, LE paresthesias with prolonged sitting, difficulty with sit <> stand transfers, car transfers; difficulty getting comfortable to go to sleep; sleep is intermittently disturbed by lower back pain.  Difficulty ascending steps.       Subjective     Update:   Pain:  Current 6/10, worst 10/10, best 5/10   Location: bilateral lower back with R extending up a little more than L.  Less so down the legs.      Current Level of Function: Difficulty with walking for "long period" (~ 15-20 minutes), able to sweep/mop but with significant increase in pain, standing tolerance limited by lower back pain.  LE paresthesias with prolonged sitting, minimal difficulty with sit <> stand transfers persists, car transfers somewhat difficult with getting in being more difficult than getting out.     Objective      Update:   Posture: Standing: equal weight bearing, pelvis and shoulders level.  Sitting: minimal lumbar " lordosis, increased thoracic kyphosis, minimal rounded shoulder and forward head posture.   Palpation: Trigger points present B lumbar paraspinals, B gluteus medius mm.  Tender to deep palpation persists B piriformis muscles, proximal hamstrings.  Increased muscle tension persists B lumbar paraspinals  Sensation: Intact to light touch B LE but reports intermittent   DTRs: Knee jerk +1 and equal.  Ankle jerk not tested due to patient having scraped her ankle   Range of Motion/Strength:   Thoracic/Lumbar AROM: Pain/Dysfunction with Movement:   Flexion                       40* - 5* = 35*  Guarded   Extension                  20* - 5* = 15*  Increased sacral pain   Right side bending    15*     Left side bending      15*     Right rotation            75%     Left rotation              50%     LE ROM Grossly WFL B              Strength: B LE grossly 4+/5; knee 4+/5; ankle R 4+/5, L 4/5     Flexibility: Hamstring length 150* B; piriformis minimal to moderate tightness B; hip flexors WNL, calves minimal to moderate tightness B.    Gait: Without AD  Analysis: No significant deficits noted.   Bed Mobility:Independent without significant difficulty/guarding  Transfers: Independent   Special Tests: Not tested this date.        Assessment     Update: Patient presents to PT today with reports of worsened lower back pain but improved LE pain. Her LE flexibility has improved but lumbar ROM has decreased.  Her functional ability has improved as indicated by decreased difficulty with rolling/scooting in bed, decrease difficulty with performing transfers, and decreased gait deviations.  She continues to experience significant lower back pain that extends into infrascapular region R more so than L.  The residual symptoms continue to contribute to limited sitting, standing, and walking tolerance, limited ability to participate in household activities.  Her expressed grief could be a significant contributing factor to elevated pain.   She may benefit from continued skilled PT services to address residual problems in order to minimize functional deficit and to maximize activity tolerance.  She would benefit from grief counseling as well (this has been discussed with patient and patient plans to seek grief counseling).      Previous Short Term Goals Status:     1) Decrease max pain to < 8/10 Ongoing  2) Facilitate improved LE flexibility Gradually progressing  3) Facilitate improved Posture Gradually progressing  4) Patient will perform sit > stand transfer after sitting > 30 min with minimal UE support 5 of 10 trials Met 6/22/2023    New Short Term Goals Status:     #1-3 continue  #4) Modify: Patient will consistently perform sit < > stand transfers with no more than minimal UE support  5) Patient will walk > 15 min without gait deviations or increased back pain.     Long Term Goal Status: continue per initial plan of care.  1) Increase walking tolerance to > 30 min Minimal progress              2) Patient will sit > 45 min without increased LE paresthesias Minimal progress              3) Patient will bend to perform LE dressing without increased back/LE pain Minimal progress              4) Patient will sweep/mop > 20 min without increased back/LE pain Minimal progress              5) Improve functional deficit to < 40% Slight progress              6) Patient will be independent in HEP for flexibility, trunk/lumbar ROM, core strength, and postural correction Gradually progressing    Reasons for Recertification of Therapy:   Patient continues to experience significant lower back pain that extends up into infrascapular region R>L.  Her trunk/lumbar ROM remains significantly limited as does her core strength and LE flexibility.  These residual problems contribute to limited activity tolerance and persistent functional deficit.     Plan     Updated Certification Period: 6/22/2023 to 8/4/2023   Recommended Treatment Plan: 2 times per week for 6  weeks:  Electrical Stimulation IFC/pre-mod/TENS, Manual Therapy, Moist Heat/ Ice, Patient Education, Therapeutic Activities, Therapeutic Exercise, and Functional Dry Needling.  Other Recommendations: Therapeutic Taping     Linda Huddleston, PT

## 2023-06-22 NOTE — PROGRESS NOTES
"[facility]   Physical Therapy Treatment Note     Name: Corina Liu  Clinic Number: [id number]    Therapy Diagnosis:   Encounter Diagnoses   Name Primary?    Impaired functional mobility and activity tolerance Yes    Decreased ROM of lumbar spine     Chronic midline low back pain with bilateral sciatica     Low back pain radiating to lower extremity        Physician: Melva Brito MD    Visit Date: 6/22/2023    Physician Orders: PT Eval and Treat   Medical Diagnosis from Referral: Chronic midline low back pain with bilateral sciatica; Low back pain radiating to lower extremity   Evaluation Date: 5/1/2023  Authorization Period Expiration: 04/18/2024   Plan of Care Expiration: 6/21/2023 (updated to 8/4/2023)  Progress Note Due: 6/21/2023 (updated to 8/4/2023)  Visit # / Visits authorized: 10/ 20 (11 total)   FOTO: Completed 6/12/2023 60% Residual Deficit  Next survey due week of 6/30/2023     Precautions: cancer     PTA Visit #: 0/5     Time In: 1403  Time Out: 1520  Total Billable Time: 26 minutes 1:1 plus 15 min electric stim    SUBJECTIVE     Pt reports: "I'm having a rough time right now." (Patient presented to PT visibly upset)  She was compliant with home exercise program.  Response to previous treatment: "Felt good."  Functional change: N/A at this time    Pain: 6/10  Location: bilateral back and LLE    OBJECTIVE     Objective Measures updated at progress report unless specified.    Treatment     Corina received the treatments listed below:      therapeutic exercises to develop strength, endurance, ROM, flexibility, posture, and core stabilization for 38 minutes including:   Recumbent bike lvl3 x8'    Standing gastroc stretch 3x30s    Multifidus walkouts w/green theraband x8 laps ea   L LE strengthening w/1#:   Hip abduction x10 ea  Hip extension x10 ea  Hamstring curl x10 ea  March x10 ea  Mini squat to chair x10  Glut med dips x 10 ea    Did not perform on this date: " (can continue)  Green Theraband postural strengthening x15 ea  Rows w/scapular retractions  B Extension w/scapular retractions  B External rotation w/scapular retractions  Posterior pelvic tilt 20x3s  Pelvic isometric w/Therabar 5x5s ea   Core reverse crunch isometric w/physioball x10  Seated hamstring stretch 3x30s ea  Piriformis stretch 3x30s ea  Thoracolumbar AROM 10x3s   Green Theraband resisted Upper trunk rotation x15   Side-bending   Flexion   Extension  Supine hip flexor stretch 3x30s ea  Lower trunk rotation using physioball 10x3s   DKTC w/physioball 10x3s   Dying bug physioball isometric x15 ea    Rechecked for POC update    To be added:   Core strengthening (progressing + alt arm/leg lift), increased posture, prone extension & prone press ups, increased L LE strengthening    manual therapy techniques: functional dry needling were applied to the: lower back for 12 minutes, including:  Informed consent obtained during prior visit after thorough explanation of risks and benefits. Signed consent form has been scanned into medical records. Pre-procedure time out performed which included verification of name, , and site of treatment. Cleaned target tissues with 70% isopropyl alcohol wipe down and performed with single use sterile prep pads.   Functional dry needling to B lower back performed as follows:  1) 50 mm needle inserted 2 finger breadths lateral (on L) to lower border of midline of L3 spinous process using 45* anteromedial and slightly caudad angulation to lumbar multifidi/rotatores  2) 50 mm needle inserted 2 finger breadths lateral ( on R) to lower border of midline of L4 spinous process using 45* anteromedial and slightly caudad angulation to lumbar multifidi/rotatores  3) 50 mm needle inserted 2 finger breadths lateral (on L) to lower border of midline of L5 spinous process using 45* anteromedial and slightly caudad angulation to lumbar multifidi/rotatores  4) 50 mm needle inserted 1 finger  breadth lateral (R) to the middle of the tip of L3 spinous process using posterior to anterior angulation (longissimus lumborum and multifidi)  5) 50 mm needle inserted 1 finger breadth lateral (L) to the middle of the tip of L4 spinous process using posterior to anterior angulation (longissimus lumborum and multifidi)  6) 50 mm needle inserted 1 finger breadth lateral ( R) to the middle of the tip of L5 spinous process using posterior to anterior angulation (longissimus lumborum and multifidi)  7) 30 mm needle inserted in midline below L2 spinous process with anterosuperior angulation (interspinous ligament)  8) 30 mm needle inserted in midline below L4 spinous process with anterosuperior angulation (interspinous ligament)  9) 75 mm needle inserted approximately 2 finger breadths inferior to the upper middle aspect of the R and the L iliac crest angled perpendicular to the ilium into the gluteus medius trigger point  10) 75 mm needle inserted approximately 2 finger breadths inferior to the upper lateral aspect of the R and L iliac crest angled perpendicular to the ilium (anteriomedial to skin surface) into the gluteus medius trigger point                            Following insertion needles were wound unidirectionally.  Treatment performed in prone. ES with FDN x 15 at 80 Hz, 150 microseconds wave frequency (using 2 channels on R and 2 on L). Intensity set to patient tolerance. Patient provided with call bell. Needles left in situ for 15 minutes after completing insertion. Patient instructed in increasing hydration habits following for decreased soreness. No adverse effects present following needling. Patient reported minimal improvement in symptoms following session     Deferred this date due to dry needling  manual therapy techniques: Soft tissue Mobilization were applied to the: lumbar paraspinals and bilateral piriformii for 10 minutes, including:  Strumming & Triggerpoint release  Anterior-posterior and  posterior-anterior IS mobilizations    supervised modalities after being cleared for contradictions: Pre-modulated Electrical Stimulation:  Corina received Pre-modulated Electrical Stimulation for pain control applied to the paraspinals in two different channels on either side of the lumbosacral region. Patient prone with half bolster underneath abdomen/ASIS. Pt received stimulation at 100% scan at a frequency of 4000Hz for 15 minutes. Corina tolerated treatment in prone lying well without any adverse effects.      hot pack in conjunction with ESTIM modalities to lumbosacral region for pain reduction and soft tissue relaxation.    Patient Education and Home Exercises     Home Exercises Provided and Patient Education Provided     Education provided:   - Patient advised to seek grief counseling as she presented to PT today intermittently crying (recently lost  and father, etc).    Written Home Exercises Provided: Patient instructed to cont prior HEP. Exercises were reviewed and Corina was able to demonstrate them prior to the end of the session.  Corina demonstrated fair  understanding of the education provided. See EMR under Patient Instructions for exercises provided during therapy sessions.    ASSESSMENT     See POC update    Corina Is progressing slowly towards her goals.   Pt prognosis is Fair.     Pt will continue to benefit from skilled outpatient physical therapy to address the deficits listed in the problem list box on initial evaluation, provide pt/family education and to maximize pt's level of independence in the home and community environment.     Pt's spiritual, cultural and educational needs considered and pt agreeable to plan of care and goals.     Anticipated barriers to physical therapy: Significant grief, long term presence of symptoms.      Goals:   See POC update    PLAN     See POC update.    Linda Huddleston, PT

## 2023-06-26 ENCOUNTER — OFFICE VISIT (OUTPATIENT)
Dept: FAMILY MEDICINE | Facility: CLINIC | Age: 38
End: 2023-06-26
Payer: MEDICAID

## 2023-06-26 VITALS
BODY MASS INDEX: 31.96 KG/M2 | HEIGHT: 60 IN | HEART RATE: 64 BPM | WEIGHT: 162.81 LBS | OXYGEN SATURATION: 100 % | DIASTOLIC BLOOD PRESSURE: 70 MMHG | SYSTOLIC BLOOD PRESSURE: 110 MMHG | RESPIRATION RATE: 18 BRPM

## 2023-06-26 DIAGNOSIS — G89.29 CHRONIC MIDLINE LOW BACK PAIN WITH BILATERAL SCIATICA: ICD-10-CM

## 2023-06-26 DIAGNOSIS — M47.894 THORACIC FACET SYNDROME: ICD-10-CM

## 2023-06-26 DIAGNOSIS — M54.42 CHRONIC MIDLINE LOW BACK PAIN WITH BILATERAL SCIATICA: ICD-10-CM

## 2023-06-26 DIAGNOSIS — M54.41 CHRONIC MIDLINE LOW BACK PAIN WITH BILATERAL SCIATICA: ICD-10-CM

## 2023-06-26 DIAGNOSIS — M54.17 LUMBOSACRAL RADICULOPATHY: ICD-10-CM

## 2023-06-26 DIAGNOSIS — M79.606 LOW BACK PAIN RADIATING TO LOWER EXTREMITY: ICD-10-CM

## 2023-06-26 DIAGNOSIS — A60.04 HERPES SIMPLEX VULVOVAGINITIS: ICD-10-CM

## 2023-06-26 DIAGNOSIS — M50.90 CERVICAL DISC DISORDER: ICD-10-CM

## 2023-06-26 DIAGNOSIS — M54.50 LOW BACK PAIN RADIATING TO LOWER EXTREMITY: ICD-10-CM

## 2023-06-26 DIAGNOSIS — M51.36 DDD (DEGENERATIVE DISC DISEASE), LUMBAR: ICD-10-CM

## 2023-06-26 DIAGNOSIS — Z79.899 ENCOUNTER FOR LONG-TERM CURRENT USE OF MEDICATION: Primary | ICD-10-CM

## 2023-06-26 DIAGNOSIS — M48.00 SPINAL STENOSIS, UNSPECIFIED SPINAL REGION: ICD-10-CM

## 2023-06-26 LAB
AMPHET+METHAMPHET UR QL: ABNORMAL
BARBITURATES UR QL SCN>200 NG/ML: NEGATIVE
BENZODIAZ UR QL SCN>200 NG/ML: ABNORMAL
BZE UR QL SCN: NEGATIVE
CANNABINOIDS UR QL SCN: ABNORMAL
CREAT UR-MCNC: 185.4 MG/DL (ref 15–325)
METHADONE UR QL SCN>300 NG/ML: NEGATIVE
OPIATES UR QL SCN: NEGATIVE
PCP UR QL SCN>25 NG/ML: NEGATIVE
TOXICOLOGY INFORMATION: ABNORMAL

## 2023-06-26 PROCEDURE — 1159F PR MEDICATION LIST DOCUMENTED IN MEDICAL RECORD: ICD-10-PCS | Mod: CPTII,,, | Performed by: FAMILY MEDICINE

## 2023-06-26 PROCEDURE — 99214 OFFICE O/P EST MOD 30 MIN: CPT | Mod: PBBFAC,PN | Performed by: FAMILY MEDICINE

## 2023-06-26 PROCEDURE — 3074F PR MOST RECENT SYSTOLIC BLOOD PRESSURE < 130 MM HG: ICD-10-PCS | Mod: CPTII,,, | Performed by: FAMILY MEDICINE

## 2023-06-26 PROCEDURE — 3078F PR MOST RECENT DIASTOLIC BLOOD PRESSURE < 80 MM HG: ICD-10-PCS | Mod: CPTII,,, | Performed by: FAMILY MEDICINE

## 2023-06-26 PROCEDURE — 3074F SYST BP LT 130 MM HG: CPT | Mod: CPTII,,, | Performed by: FAMILY MEDICINE

## 2023-06-26 PROCEDURE — 3008F PR BODY MASS INDEX (BMI) DOCUMENTED: ICD-10-PCS | Mod: CPTII,,, | Performed by: FAMILY MEDICINE

## 2023-06-26 PROCEDURE — 3078F DIAST BP <80 MM HG: CPT | Mod: CPTII,,, | Performed by: FAMILY MEDICINE

## 2023-06-26 PROCEDURE — 99999 PR PBB SHADOW E&M-EST. PATIENT-LVL IV: ICD-10-PCS | Mod: PBBFAC,,, | Performed by: FAMILY MEDICINE

## 2023-06-26 PROCEDURE — 99214 OFFICE O/P EST MOD 30 MIN: CPT | Mod: S$PBB,,, | Performed by: FAMILY MEDICINE

## 2023-06-26 PROCEDURE — 99999 PR PBB SHADOW E&M-EST. PATIENT-LVL IV: CPT | Mod: PBBFAC,,, | Performed by: FAMILY MEDICINE

## 2023-06-26 PROCEDURE — 3008F BODY MASS INDEX DOCD: CPT | Mod: CPTII,,, | Performed by: FAMILY MEDICINE

## 2023-06-26 PROCEDURE — 80307 DRUG TEST PRSMV CHEM ANLYZR: CPT | Performed by: FAMILY MEDICINE

## 2023-06-26 PROCEDURE — 99214 PR OFFICE/OUTPT VISIT, EST, LEVL IV, 30-39 MIN: ICD-10-PCS | Mod: S$PBB,,, | Performed by: FAMILY MEDICINE

## 2023-06-26 PROCEDURE — 1159F MED LIST DOCD IN RCRD: CPT | Mod: CPTII,,, | Performed by: FAMILY MEDICINE

## 2023-06-26 RX ORDER — CYCLOBENZAPRINE HCL 10 MG
10 TABLET ORAL
COMMUNITY
Start: 2023-04-29

## 2023-06-26 RX ORDER — CLOTRIMAZOLE AND BETAMETHASONE DIPROPIONATE 10; .64 MG/G; MG/G
CREAM TOPICAL
COMMUNITY
Start: 2023-05-01 | End: 2023-06-26

## 2023-06-26 RX ORDER — OXYCODONE AND ACETAMINOPHEN 10; 325 MG/1; MG/1
1 TABLET ORAL EVERY 12 HOURS PRN
Qty: 20 TABLET | Refills: 0 | Status: SHIPPED | OUTPATIENT
Start: 2023-06-26 | End: 2023-12-08 | Stop reason: ALTCHOICE

## 2023-06-26 RX ORDER — VALACYCLOVIR HYDROCHLORIDE 1 G/1
1000 TABLET, FILM COATED ORAL 2 TIMES DAILY
COMMUNITY
End: 2023-06-26 | Stop reason: SDUPTHER

## 2023-06-26 RX ORDER — ONDANSETRON 4 MG/1
TABLET, FILM COATED ORAL
COMMUNITY
Start: 2023-05-27

## 2023-06-26 RX ORDER — VALACYCLOVIR HYDROCHLORIDE 1 G/1
1000 TABLET, FILM COATED ORAL DAILY
Qty: 20 TABLET | Refills: 0 | Status: SHIPPED | OUTPATIENT
Start: 2023-06-26 | End: 2023-08-06 | Stop reason: SDUPTHER

## 2023-06-26 RX ORDER — GABAPENTIN 600 MG/1
600 TABLET ORAL 3 TIMES DAILY
Qty: 90 TABLET | Refills: 5 | Status: SHIPPED | OUTPATIENT
Start: 2023-06-26 | End: 2024-01-30

## 2023-06-26 NOTE — PATIENT INSTRUCTIONS
Thank you for allowing me to participate in your care today. It is an honor to be a part of your healthcare team at [facility]. If you had labs ordered today, you will receive notification via misterbnbt, phone call or mailed letter regarding your results within 7 days. If you have any questions or concerns regarding your visit today, please do not hesitate to contact us.  Sincerely,   Melva Brito M.D.

## 2023-06-28 ENCOUNTER — CLINICAL SUPPORT (OUTPATIENT)
Dept: REHABILITATION | Facility: HOSPITAL | Age: 38
End: 2023-06-28
Payer: MEDICAID

## 2023-06-28 DIAGNOSIS — M79.606 LOW BACK PAIN RADIATING TO LOWER EXTREMITY: ICD-10-CM

## 2023-06-28 DIAGNOSIS — G89.29 CHRONIC MIDLINE LOW BACK PAIN WITH BILATERAL SCIATICA: ICD-10-CM

## 2023-06-28 DIAGNOSIS — M54.41 CHRONIC MIDLINE LOW BACK PAIN WITH BILATERAL SCIATICA: ICD-10-CM

## 2023-06-28 DIAGNOSIS — M54.50 LOW BACK PAIN RADIATING TO LOWER EXTREMITY: ICD-10-CM

## 2023-06-28 DIAGNOSIS — M54.42 CHRONIC MIDLINE LOW BACK PAIN WITH BILATERAL SCIATICA: ICD-10-CM

## 2023-06-28 DIAGNOSIS — Z74.09 IMPAIRED FUNCTIONAL MOBILITY AND ACTIVITY TOLERANCE: Primary | ICD-10-CM

## 2023-06-28 DIAGNOSIS — M53.86 DECREASED ROM OF LUMBAR SPINE: ICD-10-CM

## 2023-06-28 PROCEDURE — 97110 THERAPEUTIC EXERCISES: CPT | Mod: PN,CQ

## 2023-06-28 NOTE — PROGRESS NOTES
"[facility]   Physical Therapy Treatment Note     Name: Corina Liu  Clinic Number: [id number]    Therapy Diagnosis:   Encounter Diagnoses   Name Primary?    Impaired functional mobility and activity tolerance Yes    Decreased ROM of lumbar spine     Chronic midline low back pain with bilateral sciatica     Low back pain radiating to lower extremity        Physician: Melva Brito MD    Visit Date: 6/28/2023    Physician Orders: PT Eval and Treat   Medical Diagnosis from Referral: Chronic midline low back pain with bilateral sciatica; Low back pain radiating to lower extremity   Evaluation Date: 5/1/2023  Authorization Period Expiration: 04/18/2024   Plan of Care Expiration: 6/21/2023 (updated to 8/4/2023)  Progress Note Due: 6/21/2023 (updated to 8/4/2023)  Visit # / Visits authorized: 11/ 20 (12 total)   FOTO: Completed 6/12/2023 60% Residual Deficit  Next survey due session 15     Precautions: cancer     PTA Visit #: 1/5     Time In: 1447  Time Out: 1531  Total Billable Time: 44 minutes    SUBJECTIVE     Pt reports: "It's actually doing a little bit better today, but I haven't been out squatting in the garden." Reports numbness & tingling "It hasn't changed in two years."  She was compliant with home exercise program.  Response to previous treatment: "It was actually good after that."  Functional change: N/A at this time    Pain: 4-5/10  Location: bilateral back and LLE    OBJECTIVE     Objective Measures updated at progress report unless specified.    Treatment     Corina received the treatments listed below:      therapeutic exercises to develop strength, endurance, ROM, flexibility, posture, and core stabilization for 44 minutes including:   Recumbent bike lvl4 x10'    Standing gastroc stretch 3x30s    Multifidus walkouts using blue theraband x8 laps ea  Glute med squat x15  Glute med dips x15 ea  Seated on physioball BlueTheraband postural strengthening x20 ea  Rows " w/scapular retractions  B Extension w/scapular retractions  B External rotation w/scapular retractions  Resisted Upper trunk rotation  Seated hamstring stretch 3x30s ea  Piriformis stretch 3x30s ea  Hooklying Core reverse crunch isometric w/physioball x15 (increase)  Dying bug physioball isometric x15 ea (increase)  Core side-bending x10  Prone press up 3x10s ea  Quadruped Bird-dog alternate arm & leg lift x10 ea    To be added: transition from recumbent bike to treadmill (fwd & retro)    Did not perform on this date: (can continue)  Posterior pelvic tilt 20x3s  Pelvic isometric w/Therabar 5x5s ea  Thoracolumbar AROM 10x3s    Side-bending   Flexion   Extension  Supine lower trunk rotation using physioball 10x3s   DKTC w/physioball 10x3s    manual therapy techniques: functional dry needling were applied to the: lower back for 12 minutes, including:  Informed consent obtained during prior visit after thorough explanation of risks and benefits. Signed consent form has been scanned into medical records. Pre-procedure time out performed which included verification of name, , and site of treatment. Cleaned target tissues with 70% isopropyl alcohol wipe down and performed with single use sterile prep pads.   Functional dry needling to B lower back performed as follows:  1) 50 mm needle inserted 2 finger breadths lateral (on L) to lower border of midline of L3 spinous process using 45* anteromedial and slightly caudad angulation to lumbar multifidi/rotatores  2) 50 mm needle inserted 2 finger breadths lateral ( on R) to lower border of midline of L4 spinous process using 45* anteromedial and slightly caudad angulation to lumbar multifidi/rotatores  3) 50 mm needle inserted 2 finger breadths lateral (on L) to lower border of midline of L5 spinous process using 45* anteromedial and slightly caudad angulation to lumbar multifidi/rotatores  4) 50 mm needle inserted 1 finger breadth lateral (R) to the middle of the tip of L3  spinous process using posterior to anterior angulation (longissimus lumborum and multifidi)  5) 50 mm needle inserted 1 finger breadth lateral (L) to the middle of the tip of L4 spinous process using posterior to anterior angulation (longissimus lumborum and multifidi)  6) 50 mm needle inserted 1 finger breadth lateral ( R) to the middle of the tip of L5 spinous process using posterior to anterior angulation (longissimus lumborum and multifidi)  7) 30 mm needle inserted in midline below L2 spinous process with anterosuperior angulation (interspinous ligament)  8) 30 mm needle inserted in midline below L4 spinous process with anterosuperior angulation (interspinous ligament)  9) 75 mm needle inserted approximately 2 finger breadths inferior to the upper middle aspect of the R and the L iliac crest angled perpendicular to the ilium into the gluteus medius trigger point  10) 75 mm needle inserted approximately 2 finger breadths inferior to the upper lateral aspect of the R and L iliac crest angled perpendicular to the ilium (anteriomedial to skin surface) into the gluteus medius trigger point                            Following insertion needles were wound unidirectionally.  Treatment performed in prone. ES with FDN x 15 at 80 Hz, 150 microseconds wave frequency (using 2 channels on R and 2 on L). Intensity set to patient tolerance. Patient provided with call bell. Needles left in situ for 15 minutes after completing insertion. Patient instructed in increasing hydration habits following for decreased soreness. No adverse effects present following needling. Patient reported minimal improvement in symptoms following session     manual therapy techniques: Soft tissue Mobilization were applied to the: lumbar paraspinals and bilateral piriformii for 10 minutes, including:  Strumming & Triggerpoint release  Anterior-posterior and posterior-anterior IS mobilizations    supervised modalities after being cleared for  contradictions: Pre-modulated Electrical Stimulation:  Corina received Pre-modulated Electrical Stimulation for pain control applied to the paraspinals in two different channels on either side of the lumbosacral region. Patient prone with half bolster underneath abdomen/ASIS. Pt received stimulation at 100% scan at a frequency of 4000Hz for 15 minutes. Corina tolerated treatment in prone lying well without any adverse effects.      hot pack in conjunction with ESTIM modalities to lumbosacral region for pain reduction and soft tissue relaxation.    Patient Education and Home Exercises     Home Exercises Provided and Patient Education Provided     Education provided:   - The patient was instructed in increased exercise complexity as stated above in bold print.    Written Home Exercises Provided: Patient instructed to cont prior HEP. Exercises were reviewed and Corina was able to demonstrate them prior to the end of the session.  Corina demonstrated fair  understanding of the education provided. See EMR under Patient Instructions for exercises provided during therapy sessions.    ASSESSMENT     The patient reported to physical therapy stating reduced pain compared to previous sessions, but no change in radiculopathy symptoms. Patient appeared to be in higher spirits today compared to previously as well. Corina Liu was progressed with increased exercise complexity to promote increased stability, core strength, range of motion, and flexibility to assist with overall function. The patient did not report increased pain during session, but did experience moderately noted fatigue during bird-dog and core side-bending exercises. Patient deferred manual techniques and modalities post.    Corina Is progressing slowly towards her goals.   Pt prognosis is Fair.     Pt will continue to benefit from skilled outpatient physical therapy to address the deficits listed in the problem list box on initial evaluation, provide  pt/family education and to maximize pt's level of independence in the home and community environment.     Pt's spiritual, cultural, and educational needs considered and pt agreeable to plan of care and goals.     Anticipated barriers to physical therapy: Significant grief, long term presence of symptoms.      Goals:  Previous Short Term Goals Status:     1) Decrease max pain to < 8/10 Ongoing  2) Facilitate improved LE flexibility Progressing  3) Facilitate improved Posture Gradually progressing  4) Patient will perform sit > stand transfer after sitting > 30 min with minimal UE support 5 of 10 trials Met 6/22/2023     New Short Term Goals Status:     #1-3 continue  #4) Modify: Patient will consistently perform sit < > stand transfers with no more than minimal UE support Progressing  5) Patient will walk > 15 min without gait deviations or increased back pain. Ongoing     Long Term Goal Status: continue per initial plan of care.  1) Increase walking tolerance to > 30 min Minimal progress              2) Patient will sit > 45 min without increased LE paresthesias Minimal progress              3) Patient will bend to perform LE dressing without increased back/LE pain Slow progress              4) Patient will sweep/mop > 20 min without increased back/LE pain Slow progress              5) Improve functional deficit to < 40% Slight progress              6) Patient will be independent in HEP for flexibility, trunk/lumbar ROM, core strength, and postural correction Gradually progressing    PLAN     POC: 2 times per week for 6 weeks:  Electrical Stimulation IFC/pre-mod/TENS, Manual Therapy, Moist Heat/ Ice, Patient Education, Therapeutic Activities, Therapeutic Exercise, and Functional Dry Needling.    The patient is to be progressed within the established plan of care as tolerated in order to accomplish goals as stated above. Plan to increase exercise complexity as tolerated and transition from recumbent bike to treadmill  to work on ambulation tolerance. Continue provision of functional dry needling with PT.    Emilie Radford, PTA

## 2023-07-03 ENCOUNTER — TELEPHONE (OUTPATIENT)
Dept: FAMILY MEDICINE | Facility: CLINIC | Age: 38
End: 2023-07-03
Payer: MEDICAID

## 2023-07-03 NOTE — TELEPHONE ENCOUNTER
Returned pharmacy's call regarding patients was flagged with too many opiates/pain med and unable to fill Percocet at this time.  Informed will route message to provider and can tell patient she needs to make an appt with her provider.

## 2023-07-03 NOTE — TELEPHONE ENCOUNTER
----- Message from Camelia Shahnaz sent at 7/3/2023  1:30 PM CDT -----  Contact: [facility]  Type:  Pharmacy Calling to Clarify an RX    Name of Caller:  [facility]  Pharmacy Name:  [facility]  Prescription Name:  oxyCODONE-acetaminophen (PERCOCET)  mg per tablet    oxyCODONE-acetaminophen (PERCOCET)  mg per tablet      What do they need to clarify?:  yes  Best Call Back Number:  [phone]  Additional Information:  please call

## 2023-07-05 ENCOUNTER — CLINICAL SUPPORT (OUTPATIENT)
Dept: REHABILITATION | Facility: HOSPITAL | Age: 38
End: 2023-07-05
Payer: MEDICAID

## 2023-07-05 ENCOUNTER — TELEPHONE (OUTPATIENT)
Dept: FAMILY MEDICINE | Facility: CLINIC | Age: 38
End: 2023-07-05
Payer: MEDICAID

## 2023-07-05 DIAGNOSIS — M54.42 CHRONIC MIDLINE LOW BACK PAIN WITH BILATERAL SCIATICA: ICD-10-CM

## 2023-07-05 DIAGNOSIS — M79.606 LOW BACK PAIN RADIATING TO LOWER EXTREMITY: ICD-10-CM

## 2023-07-05 DIAGNOSIS — G89.29 CHRONIC MIDLINE LOW BACK PAIN WITH BILATERAL SCIATICA: ICD-10-CM

## 2023-07-05 DIAGNOSIS — Z74.09 IMPAIRED FUNCTIONAL MOBILITY AND ACTIVITY TOLERANCE: Primary | ICD-10-CM

## 2023-07-05 DIAGNOSIS — M54.50 LOW BACK PAIN RADIATING TO LOWER EXTREMITY: ICD-10-CM

## 2023-07-05 DIAGNOSIS — M53.86 DECREASED ROM OF LUMBAR SPINE: ICD-10-CM

## 2023-07-05 DIAGNOSIS — M54.41 CHRONIC MIDLINE LOW BACK PAIN WITH BILATERAL SCIATICA: ICD-10-CM

## 2023-07-05 PROCEDURE — 97110 THERAPEUTIC EXERCISES: CPT | Mod: PN,CQ

## 2023-07-05 NOTE — TELEPHONE ENCOUNTER
----- Message from Domonique Arroyo RN sent at 7/3/2023  2:25 PM CDT -----  Contact: [facility]  FYI    Returned pharmacy's call regarding patients was flagged with too many opiates/pain med and unable to fill Percocet at this time.  Informed will route message to provider and can tell patient she needs to make an appt with her provider.  ----- Message -----  From: Camelia Christie  Sent: 7/3/2023   1:34 PM CDT  To: Alisha LEYVA Staff    Type:  Pharmacy Calling to Clarify an RX    Name of Caller:  [facility]  Pharmacy Name:  [facility]  Prescription Name:  oxyCODONE-acetaminophen (PERCOCET)  mg per tablet    oxyCODONE-acetaminophen (PERCOCET)  mg per tablet      What do they need to clarify?:  yes  Best Call Back Number:  [phone]  Additional Information:  please call

## 2023-07-05 NOTE — TELEPHONE ENCOUNTER
----- Message from Domonique Arroyo RN sent at 7/3/2023  2:25 PM CDT -----  Contact: [facility]  FYI    Returned pharmacy's call regarding patients was flagged with too many opiates/pain med and unable to fill Percocet at this time.  Informed will route message to provider and can tell patient she needs to make an appt with her provider.  ----- Message -----  From: Camelia Christie  Sent: 7/3/2023   1:34 PM CDT  To: [facility] Staff    Type:  Pharmacy Calling to Clarify an RX    Name of Caller:  [facility]  Pharmacy Name:  [facility]  Prescription Name:  oxyCODONE-acetaminophen (PERCOCET)  mg per tablet    oxyCODONE-acetaminophen (PERCOCET)  mg per tablet      What do they need to clarify?:  yes  Best Call Back Number:  [phone]  Additional Information:  please call

## 2023-07-05 NOTE — PROGRESS NOTES
"[facility]   Physical Therapy Treatment Note     Name: Corina Liu  Clinic Number: [id number]    Therapy Diagnosis:   Encounter Diagnoses   Name Primary?    Impaired functional mobility and activity tolerance Yes    Decreased ROM of lumbar spine     Chronic midline low back pain with bilateral sciatica     Low back pain radiating to lower extremity        Physician: Melva Brito MD    Visit Date: 7/5/2023    Physician Orders: PT Eval and Treat   Medical Diagnosis from Referral: Chronic midline low back pain with bilateral sciatica; Low back pain radiating to lower extremity   Evaluation Date: 5/1/2023  Authorization Period Expiration: 04/18/2024   Plan of Care Expiration: 6/21/2023 (updated to 8/4/2023)  Progress Note Due: 6/21/2023 (updated to 8/4/2023)  Visit # / Visits authorized: 12/ 20 (13 total)   FOTO: Completed 7/5/2023 54% Functional score  Next survey due at discharge     Precautions: cancer     PTA Visit #: 1/5     Time In: 1455  Time Out: 1548  Total Billable Time: 53 minutes    SUBJECTIVE     Pt reports: "I am alright. I am starting to get my head-space back after everything."  She was compliant with home exercise program.  Response to previous treatment: "Not bad."  Functional change: N/A at this time    Pain: 4/10  Location: bilateral back and LLE    OBJECTIVE     Objective Measures updated at progress report unless specified.    Treatment     Corina received the treatments listed below:      therapeutic exercises to develop strength, endurance, ROM, flexibility, posture, and core stabilization for 53 minutes including:   Treadmill 1.5mph x5' (add retro)   Standing gastroc stretch 3x30s    Multifidus walkouts using blue theraband x8 laps ea  Glute med squat x20  Glute med dips 2x10 ea  Seated on physioball BlueTheraband postural strengthening x20 ea  Rows w/scapular retractions  B Extension w/scapular retractions  B External rotation w/scapular " retractions  Resisted Upper trunk rotation  Core march x10 ea  Pelvic tilts x10 ea   Anterior/posterior   Lateral  Seated hamstring stretch 3x30s ea  Piriformis stretch 3x30s ea  Thoracolumbar flexion physioball rollout 10x3s  Middle trap/Rhomboid stretch 5x5s ea  (Seated today) Hooklying Core reverse crunch isometric w/physioball x15 (increase)  Dying bug physioball isometric x15 ea (increase)    *reviewed UT & LS stretches*    To be added: treadmill retro    Did not perform on this date: (can continue)  *Core side-bending x10*  *Prone press up 3x10s ea*  *Quadruped Bird-dog alternate arm & leg lift x10 ea*  Posterior pelvic tilt 20x3s  Pelvic isometric w/Therabar 5x5s ea  Thoracolumbar AROM 10x3s    Side-bending   Extension  Supine lower trunk rotation using physioball 10x3s   *DKTC w/physioball 10x3s*    manual therapy techniques: functional dry needling were applied to the: lower back for 12 minutes, including:  Informed consent obtained during prior visit after thorough explanation of risks and benefits. Signed consent form has been scanned into medical records. Pre-procedure time out performed which included verification of name, , and site of treatment. Cleaned target tissues with 70% isopropyl alcohol wipe down and performed with single use sterile prep pads.   Functional dry needling to B lower back performed as follows:  1) 50 mm needle inserted 2 finger breadths lateral (on L) to lower border of midline of L3 spinous process using 45* anteromedial and slightly caudad angulation to lumbar multifidi/rotatores  2) 50 mm needle inserted 2 finger breadths lateral ( on R) to lower border of midline of L4 spinous process using 45* anteromedial and slightly caudad angulation to lumbar multifidi/rotatores  3) 50 mm needle inserted 2 finger breadths lateral (on L) to lower border of midline of L5 spinous process using 45* anteromedial and slightly caudad angulation to lumbar multifidi/rotatores  4) 50 mm needle  inserted 1 finger breadth lateral (R) to the middle of the tip of L3 spinous process using posterior to anterior angulation (longissimus lumborum and multifidi)  5) 50 mm needle inserted 1 finger breadth lateral (L) to the middle of the tip of L4 spinous process using posterior to anterior angulation (longissimus lumborum and multifidi)  6) 50 mm needle inserted 1 finger breadth lateral ( R) to the middle of the tip of L5 spinous process using posterior to anterior angulation (longissimus lumborum and multifidi)  7) 30 mm needle inserted in midline below L2 spinous process with anterosuperior angulation (interspinous ligament)  8) 30 mm needle inserted in midline below L4 spinous process with anterosuperior angulation (interspinous ligament)  9) 75 mm needle inserted approximately 2 finger breadths inferior to the upper middle aspect of the R and the L iliac crest angled perpendicular to the ilium into the gluteus medius trigger point  10) 75 mm needle inserted approximately 2 finger breadths inferior to the upper lateral aspect of the R and L iliac crest angled perpendicular to the ilium (anteriomedial to skin surface) into the gluteus medius trigger point                            Following insertion needles were wound unidirectionally.  Treatment performed in prone. ES with FDN x 15 at 80 Hz, 150 microseconds wave frequency (using 2 channels on R and 2 on L). Intensity set to patient tolerance. Patient provided with call bell. Needles left in situ for 15 minutes after completing insertion. Patient instructed in increasing hydration habits following for decreased soreness. No adverse effects present following needling. Patient reported minimal improvement in symptoms following session     manual therapy techniques: Soft tissue Mobilization were applied to the: lumbar paraspinals and bilateral piriformii for 10 minutes, including:  Strumming & Triggerpoint release  Anterior-posterior and posterior-anterior IS  mobilizations    supervised modalities after being cleared for contradictions: Pre-modulated Electrical Stimulation:  Corina received Pre-modulated Electrical Stimulation for pain control applied to the paraspinals in two different channels on either side of the lumbosacral region. Patient prone with half bolster underneath abdomen/ASIS. Pt received stimulation at 100% scan at a frequency of 4000Hz for 15 minutes. Corina tolerated treatment in prone lying well without any adverse effects.      hot pack in conjunction with ESTIM modalities to lumbosacral region for pain reduction and soft tissue relaxation.    Patient Education and Home Exercises     Home Exercises Provided and Patient Education Provided     Education provided:   - The patient was instructed in increased exercise complexity and additional therapeutic exercise as stated above in bold print.    Written Home Exercises Provided: Patient instructed to cont prior HEP. Exercises were reviewed and Corina was able to demonstrate them prior to the end of the session. Corina demonstrated fair  understanding of the education provided. See EMR under Patient Instructions for exercises provided during therapy sessions.    ASSESSMENT     The patient reported to physical therapy with reduced back pain noted. Corina Liu was progressed with increased exercise complexity and additional therapeutic exercise, focusing on ambulation and sitting tolerance, core strength, stability, posture, and flexibility. Patient began to report discomfort due to increased tension noted in upper and middle trapezius, R > L; stretches were reviewed to assist. Noe is progressing with activity/exercise tolerance/endurance, as well as thoracolumbar range of motion and decreased reports of back pain. The entire treatment program was not completed in its entirety due to availability.    Corina Is progressing slowly towards her goals.   Pt prognosis is Fair.     Pt will continue to  benefit from skilled outpatient physical therapy to address the deficits listed in the problem list box on initial evaluation, provide pt/family education and to maximize pt's level of independence in the home and community environment.     Pt's spiritual, cultural, and educational needs considered and pt agreeable to plan of care and goals.     Anticipated barriers to physical therapy: Significant grief, long term presence of symptoms.      Goals:  Previous Short Term Goals Status:     1) Decrease max pain to < 8/10 Progressing  2) Facilitate improved LE flexibility Progressing  3) Facilitate improved Posture Gradually progressing  4) Patient will perform sit > stand transfer after sitting > 30 min with minimal UE support 5 of 10 trials Met 6/22/2023     New Short Term Goals Status:     #1-3 continue  #4) Modify: Patient will consistently perform sit < > stand transfers with no more than minimal UE support Progressing  5) Patient will walk > 15 min without gait deviations or increased back pain. Slowly progressing     Long Term Goal Status: continue per initial plan of care.  1) Increase walking tolerance to > 30 min Slowly progressing              2) Patient will sit > 45 min without increased LE paresthesias Slowly progressing              3) Patient will bend to perform LE dressing without increased back/LE pain Slowly progressing              4) Patient will sweep/mop > 20 min without increased back/LE pain Slowly progressing              5) Improve functional deficit to < 40% Progressing              6) Patient will be independent in HEP for flexibility, trunk/lumbar ROM, core strength, and postural correction Gradually progressing    PLAN     POC: 2 times per week for 6 weeks:  Electrical Stimulation IFC/pre-mod/TENS, Manual Therapy, Moist Heat/ Ice, Patient Education, Therapeutic Activities, Therapeutic Exercise, and Functional Dry Needling.    The patient is to be progressed within the established plan of  care as tolerated in order to accomplish goals as stated above. Plan to provision of functional dry needling with PT in subsequent session. Continue starred exercises (core strengthening). Prepare for discharge.    Emilie Radford, PTA

## 2023-07-12 ENCOUNTER — CLINICAL SUPPORT (OUTPATIENT)
Dept: REHABILITATION | Facility: HOSPITAL | Age: 38
End: 2023-07-12
Payer: MEDICAID

## 2023-07-12 DIAGNOSIS — M54.42 CHRONIC MIDLINE LOW BACK PAIN WITH BILATERAL SCIATICA: ICD-10-CM

## 2023-07-12 DIAGNOSIS — G89.29 CHRONIC MIDLINE LOW BACK PAIN WITH BILATERAL SCIATICA: ICD-10-CM

## 2023-07-12 DIAGNOSIS — M54.50 LOW BACK PAIN RADIATING TO LOWER EXTREMITY: ICD-10-CM

## 2023-07-12 DIAGNOSIS — M53.86 DECREASED ROM OF LUMBAR SPINE: ICD-10-CM

## 2023-07-12 DIAGNOSIS — Z74.09 IMPAIRED FUNCTIONAL MOBILITY AND ACTIVITY TOLERANCE: Primary | ICD-10-CM

## 2023-07-12 DIAGNOSIS — M79.606 LOW BACK PAIN RADIATING TO LOWER EXTREMITY: ICD-10-CM

## 2023-07-12 DIAGNOSIS — M54.41 CHRONIC MIDLINE LOW BACK PAIN WITH BILATERAL SCIATICA: ICD-10-CM

## 2023-07-12 PROCEDURE — 97110 THERAPEUTIC EXERCISES: CPT | Mod: PN

## 2023-07-12 PROCEDURE — 97014 ELECTRIC STIMULATION THERAPY: CPT | Mod: PN

## 2023-07-12 PROCEDURE — 97140 MANUAL THERAPY 1/> REGIONS: CPT | Mod: PN

## 2023-07-12 NOTE — PROGRESS NOTES
"[facility]   Physical Therapy Treatment Note     Name: Corina Liu  Clinic Number: [id number]    Therapy Diagnosis:   Encounter Diagnoses   Name Primary?    Impaired functional mobility and activity tolerance Yes    Decreased ROM of lumbar spine     Chronic midline low back pain with bilateral sciatica     Low back pain radiating to lower extremity          Physician: Melva Brito MD    Visit Date: 7/12/2023    Physician Orders: PT Eval and Treat   Medical Diagnosis from Referral: Chronic midline low back pain with bilateral sciatica; Low back pain radiating to lower extremity   Evaluation Date: 5/1/2023  Authorization Period Expiration: 04/18/2024   Plan of Care Expiration: 6/21/2023 (updated to 8/4/2023)  Progress Note Due: 6/21/2023 (updated to 8/4/2023)  Visit # / Visits authorized: 13/ 20 (14 total)   FOTO: Completed 7/5/2023 54% Functional score  Next survey due at discharge     Precautions: cancer     PTA Visit #: 0/5     Time In: 1112  Time Out: 1202  Total Billable Time: 48 minutes    SUBJECTIVE     Pt reports: "I just woke up, but I think I feel all right."  She was compliant with home exercise program.  Response to previous treatment: "It felt all right."  Functional change: N/A at this time    Pain: 4/10  Location: bilateral back and LLE    OBJECTIVE     Objective Measures updated at progress report unless specified.    Treatment     Corina received the treatments listed below:      therapeutic exercises to develop strength, endurance, ROM, flexibility, posture, and core stabilization for 20 minutes including:   Treadmill 1.7 mph x5' & 3' retro at 0.5mph   Standing gastroc stretch 3x30s    Multifidus walkouts using blue theraband x8 laps ea  Seated hamstring stretch 3x30s ea  Piriformis stretch 3x30s ea  Remainder of treatment completed by supervising physical therapist to complete dry needling. See below    To be added:     Did not perform on this date: (can " continue)  *Core side-bending x10*  *Prone press up 3x10s ea*  *Quadruped Bird-dog alternate arm & leg lift x10 ea*  Posterior pelvic tilt 20x3s  Pelvic isometric w/Therabar 5x5s ea  Thoracolumbar AROM 10x3s    Side-bending   Extension  Supine lower trunk rotation using physioball 10x3s   *DKTC w/physioball 10x3s*    Standing Glute med squat x20  Glute med dips 2x10 ea  Seated on physioball BlueTheraband postural strengthening x20 ea  Rows w/scapular retractions  B Extension w/scapular retractions  B External rotation w/scapular retractions  Resisted Upper trunk rotation  Core march x10 ea  Pelvic tilts x10 ea   Anterior/posterior   Lateral  Thoracolumbar flexion physioball rollout 10x3s  Middle trap/Rhomboid stretch 5x5s ea  (Seated today) Hooklying Core reverse crunch isometric w/physioball x15 (increase)  Dying bug physioball isometric x15 ea (increase)      manual therapy techniques: Soft tissue Mobilization were applied to the: lumbar paraspinals and bilateral piriformii for 10 minutes, including:  Strumming & Triggerpoint release  Anterior-posterior and posterior-anterior IS mobilizations    supervised modalities after being cleared for contradictions: Pre-modulated Electrical Stimulation:  Corina received Pre-modulated Electrical Stimulation for pain control applied to the paraspinals in two different channels on either side of the lumbosacral region. Patient prone with half bolster underneath abdomen/ASIS. Pt received stimulation at 100% scan at a frequency of 4000Hz for 15 minutes. Corina tolerated treatment in prone lying well without any adverse effects.      hot pack in conjunction with ESTIM modalities to lumbosacral region for pain reduction and soft tissue relaxation.    Patient Education and Home Exercises     Home Exercises Provided and Patient Education Provided     Education provided:   - The patient was instructed in reverse ambulation on treadmill.     Written Home Exercises Provided: Patient  instructed to cont prior HEP. Exercises were reviewed and Corina was able to demonstrate them prior to the end of the session. Corina demonstrated fair  understanding of the education provided. See EMR under Patient Instructions for exercises provided during therapy sessions.    ASSESSMENT     The patient reported to physical therapy for her first scheduled visit of the week.  Exercises completed as outlined with no adverse reactions.  Exercises were limited today due to time constraints.  Dry needling rendered.    Corina Is progressing slowly towards her goals.   Pt prognosis is Fair.     Pt will continue to benefit from skilled outpatient physical therapy to address the deficits listed in the problem list box on initial evaluation, provide pt/family education and to maximize pt's level of independence in the home and community environment.     Pt's spiritual, cultural, and educational needs considered and pt agreeable to plan of care and goals.     Anticipated barriers to physical therapy: Significant grief, long term presence of symptoms.      Goals:  Previous Short Term Goals Status:     1) Decrease max pain to < 8/10 Progressing  2) Facilitate improved LE flexibility Progressing  3) Facilitate improved Posture Gradually progressing  4) Patient will perform sit > stand transfer after sitting > 30 min with minimal UE support 5 of 10 trials Met 6/22/2023     New Short Term Goals Status:     #1-3 continue  #4) Modify: Patient will consistently perform sit < > stand transfers with no more than minimal UE support Progressing  5) Patient will walk > 15 min without gait deviations or increased back pain. Slowly progressing     Long Term Goal Status: continue per initial plan of care.  1) Increase walking tolerance to > 30 min Slowly progressing              2) Patient will sit > 45 min without increased LE paresthesias Slowly progressing              3) Patient will bend to perform LE dressing without increased  back/LE pain Slowly progressing              4) Patient will sweep/mop > 20 min without increased back/LE pain Slowly progressing              5) Improve functional deficit to < 40% Progressing              6) Patient will be independent in HEP for flexibility, trunk/lumbar ROM, core strength, and postural correction Gradually progressing    PLAN     POC: 2 times per week for 6 weeks:  Electrical Stimulation IFC/pre-mod/TENS, Manual Therapy, Moist Heat/ Ice, Patient Education, Therapeutic Activities, Therapeutic Exercise, and Functional Dry Needling.    The patient is to be progressed within the established plan of care as tolerated in order to accomplish goals as stated above. Patient will continue core strengthening and ROM exercises.  Patient will be instructed in proper body mechanics during lifting.    KARLI Neely      manual therapy techniques: functional dry needling were applied to the: lower back for 12 minutes, including:  Informed consent obtained during prior visit after thorough explanation of risks and benefits. Signed consent form has been scanned into medical records. Pre-procedure time out performed which included verification of name, , and site of treatment. Cleaned target tissues with 70% isopropyl alcohol wipe down and performed with single use sterile prep pads.   Functional dry needling to B lower back performed as follows:  1) 50 mm needle inserted 2 finger breadths lateral (on L) to lower border of midline of L3 spinous process using 45* anteromedial and slightly caudad angulation to lumbar multifidi/rotatores  2) 50 mm needle inserted 2 finger breadths lateral ( on R) to lower border of midline of L4 spinous process using 45* anteromedial and slightly caudad angulation to lumbar multifidi/rotatores  3) 50 mm needle inserted 2 finger breadths lateral (on L) to lower border of midline of L5 spinous process using 45* anteromedial and slightly caudad angulation to lumbar  multifidi/rotatores  4) 50 mm needle inserted 1 finger breadth lateral (R) to the middle of the tip of L3 spinous process using posterior to anterior angulation (longissimus lumborum and multifidi)  5) 50 mm needle inserted 1 finger breadth lateral (L) to the middle of the tip of L4 spinous process using posterior to anterior angulation (longissimus lumborum and multifidi)  6) 50 mm needle inserted 1 finger breadth lateral ( R) to the middle of the tip of L5 spinous process using posterior to anterior angulation (longissimus lumborum and multifidi)  7) 30 mm needle inserted in midline below L2 spinous process with anterosuperior angulation (interspinous ligament)  8) 30 mm needle inserted in midline below L4 spinous process with anterosuperior angulation (interspinous ligament)  9) 75 mm needle inserted approximately 2 finger breadths inferior to the upper middle aspect of the R and the L iliac crest angled perpendicular to the ilium into the gluteus medius trigger point  10) 75 mm needle inserted approximately 2 finger breadths inferior to the upper lateral aspect of the R and L iliac crest angled perpendicular to the ilium (anteriomedial to skin surface) into the gluteus medius trigger point                           Following insertion needles were wound unidirectionally. Treatment performed in prone.   Unsupervised modalities: Electrical Stimulation with FDN x 15 at 80 Hz, 150 microseconds wave frequency (using 2 channels on R and 2 on L). Intensity set to patient tolerance. Patient provided with call bell. Needles left in situ for 15 minutes after completing insertion. Patient instructed in increasing hydration habits following for decreased soreness. No adverse effects present following needling. Patient reported improvement in symptoms following session.    Linda Huddleston, PT

## 2023-07-17 DIAGNOSIS — E07.9 THYROID DISEASE: ICD-10-CM

## 2023-07-17 RX ORDER — LEVOTHYROXINE SODIUM 100 UG/1
TABLET ORAL
Qty: 90 TABLET | Refills: 0 | Status: SHIPPED | OUTPATIENT
Start: 2023-07-17 | End: 2023-11-01 | Stop reason: SDUPTHER

## 2023-07-19 ENCOUNTER — CLINICAL SUPPORT (OUTPATIENT)
Dept: REHABILITATION | Facility: HOSPITAL | Age: 38
End: 2023-07-19
Payer: MEDICAID

## 2023-07-19 DIAGNOSIS — M53.86 DECREASED ROM OF LUMBAR SPINE: ICD-10-CM

## 2023-07-19 DIAGNOSIS — M54.50 LOW BACK PAIN RADIATING TO LOWER EXTREMITY: ICD-10-CM

## 2023-07-19 DIAGNOSIS — G89.29 CHRONIC MIDLINE LOW BACK PAIN WITH BILATERAL SCIATICA: ICD-10-CM

## 2023-07-19 DIAGNOSIS — M79.606 LOW BACK PAIN RADIATING TO LOWER EXTREMITY: ICD-10-CM

## 2023-07-19 DIAGNOSIS — M54.42 CHRONIC MIDLINE LOW BACK PAIN WITH BILATERAL SCIATICA: ICD-10-CM

## 2023-07-19 DIAGNOSIS — Z74.09 IMPAIRED FUNCTIONAL MOBILITY AND ACTIVITY TOLERANCE: Primary | ICD-10-CM

## 2023-07-19 DIAGNOSIS — M54.41 CHRONIC MIDLINE LOW BACK PAIN WITH BILATERAL SCIATICA: ICD-10-CM

## 2023-07-19 PROCEDURE — 97014 ELECTRIC STIMULATION THERAPY: CPT | Mod: PN

## 2023-07-19 PROCEDURE — 97140 MANUAL THERAPY 1/> REGIONS: CPT | Mod: PN

## 2023-07-19 PROCEDURE — 97110 THERAPEUTIC EXERCISES: CPT | Mod: PN

## 2023-07-19 NOTE — PROGRESS NOTES
"[facility]   Physical Therapy Treatment Note     Name: Corina Liu  Clinic Number: [id number]    Therapy Diagnosis:   Encounter Diagnoses   Name Primary?    Impaired functional mobility and activity tolerance Yes    Decreased ROM of lumbar spine     Chronic midline low back pain with bilateral sciatica     Low back pain radiating to lower extremity        Physician: Melva Brito MD    Visit Date: 7/19/2023    Physician Orders: PT Eval and Treat   Medical Diagnosis from Referral: Chronic midline low back pain with bilateral sciatica; Low back pain radiating to lower extremity   Evaluation Date: 5/1/2023  Authorization Period Expiration: 04/18/2024   Plan of Care Expiration: 6/21/2023 (updated to 8/4/2023)  Progress Note Due: 6/21/2023 (updated to 8/4/2023)  Visit # / Visits authorized: 14/ 20 (15 total)   FOTO: Completed 7/5/2023 54% Functional score  Next survey due at discharge     Precautions: cancer     PTA Visit #: 0/5     Time In: 1504  Time Out: 1615  Total Billable Time: 63 minutes    SUBJECTIVE     Pt reports: "I'm all right.  I had to take a pain pill earlier.  When I don't get enough sleep it tends to be worse."  She was compliant with home exercise program.  Response to previous treatment: "It was more sore at first, but then it started feeling better."  Functional change: N/A at this time    Pain: 5/10  Location: bilateral back and LLE    OBJECTIVE     Objective Measures updated at progress report unless specified.    Treatment     Corina received the treatments listed below:      therapeutic exercises to develop strength, endurance, ROM, flexibility, posture, and core stabilization for 37 minutes including:   Treadmill 1.7 mph x5' & 3' retro at 0.5mph   Standing gastroc stretch 3x30s   Seated on physiTradeosll BlueTheraband postural strengthening x20 ea  Rows w/scapular retractions  B Extension w/scapular retractions  B External rotation w/scapular " retractions  Resisted Upper trunk rotation  Seated hamstring stretch 3x30s ea  Piriformis stretch 3x30s ea  Supine lower trunk rotation using physioball 10x3s   DKTC w/physioball 10x3s    Remainder of treatment completed by supervising physical therapist to complete dry needling. See below    To be added: body mechanics with lifting    Did not perform on this date: (can continue)  *Core side-bending x10*  *Prone press up 3x10s ea*  *Quadruped Bird-dog alternate arm & leg lift x10 ea*  Posterior pelvic tilt 20x3s  Pelvic isometric w/Therabar 5x5s ea  Thoracolumbar AROM 10x3s    Side-bending   Extension  Standing Glute med squat x20  Glute med dips 2x10 ea  Core march x10 ea  Pelvic tilts x10 ea   Anterior/posterior   Lateral  Thoracolumbar flexion physioball rollout 10x3s  Middle trap/Rhomboid stretch 5x5s ea  (Seated today) Hooklying Core reverse crunch isometric w/physioball x15 (increase)  Dying bug physioball isometric x15 ea (increase)  Multifidus walkouts using blue theraband x8 laps ea    manual therapy techniques: Soft tissue Mobilization were applied to the: lumbar paraspinals and bilateral piriformii for 10 minutes, including:  Strumming & Triggerpoint release  Anterior-posterior and posterior-anterior IS mobilizations    supervised modalities after being cleared for contradictions: Pre-modulated Electrical Stimulation:  Corina received Pre-modulated Electrical Stimulation for pain control applied to the paraspinals in two different channels on either side of the lumbosacral region. Patient prone with half bolster underneath abdomen/ASIS. Pt received stimulation at 100% scan at a frequency of 4000Hz for 15 minutes. Corina tolerated treatment in prone lying well without any adverse effects.      hot pack in conjunction with ESTIM modalities to lumbosacral region for pain reduction and soft tissue relaxation.    Patient Education and Home Exercises     Home Exercises Provided and Patient Education Provided      Education provided:   - The patient was instructed to continue in current HEP.     Written Home Exercises Provided: Patient instructed to cont prior HEP. Exercises were reviewed and Corina was able to demonstrate them prior to the end of the session. Corina demonstrated fair  understanding of the education provided. See EMR under Patient Instructions for exercises provided during therapy sessions.    ASSESSMENT     Corina presents to physical therapy reporting increased pain today.  She reports taking pain medication this morning but still maintains an elevated pain level. She was instructed in exercises as outlined with a slight increase in back pain during seated upper trunk rotations.  Patient was able to maintain an upright posture throughout postural exercises on physioball without verbal or tactile cues.  However, she quickly returned to a slumped postured once exercises were completed.  Dry needling rendered today for pain control.     Corina Is progressing slowly towards her goals.   Pt prognosis is Fair.     Pt will continue to benefit from skilled outpatient physical therapy to address the deficits listed in the problem list box on initial evaluation, provide pt/family education and to maximize pt's level of independence in the home and community environment.     Pt's spiritual, cultural, and educational needs considered and pt agreeable to plan of care and goals.     Anticipated barriers to physical therapy: Significant grief, long term presence of symptoms.      Goals:  Previous Short Term Goals Status:     1) Decrease max pain to < 8/10 Ongoing (current max 10/10)  2) Facilitate improved LE flexibility Progressing  3) Facilitate improved Posture Gradually progressing  4) Patient will perform sit > stand transfer after sitting > 30 min with minimal UE support 5 of 10 trials Met 6/22/2023     New Short Term Goals Status:     #1-3 continue  #4) Modify: Patient will consistently perform sit < >  stand transfers with no more than minimal UE support Progressing  5) Patient will walk > 15 min without gait deviations or increased back pain. Slowly progressing     Long Term Goal Status: continue per initial plan of care.  1) Increase walking tolerance to > 30 min Slowly progressing              2) Patient will sit > 45 min without increased LE paresthesias Slowly progressing              3) Patient will bend to perform LE dressing without increased back/LE pain Slowly progressing              4) Patient will sweep/mop > 20 min without increased back/LE pain Slowly progressing              5) Improve functional deficit to < 40% Progressing              6) Patient will be independent in HEP for flexibility, trunk/lumbar ROM, core strength, and postural correction Gradually progressing    PLAN     POC: 2 times per week for 6 weeks:  Electrical Stimulation IFC/pre-mod/TENS, Manual Therapy, Moist Heat/ Ice, Patient Education, Therapeutic Activities, Therapeutic Exercise, and Functional Dry Needling.    Patient will be instructed in proper body mechanics during lifting.  She will continue to be instructed in core strengthening.  Manual therapy will be rendered as needed.     Analy Modi, SPT      manual therapy techniques: functional dry needling were applied to the: lower back for 12 minutes, including:  Informed consent obtained during prior visit after thorough explanation of risks and benefits. Signed consent form has been scanned into medical records. Pre-procedure time out performed which included verification of name, , and site of treatment. Cleaned target tissues with 70% isopropyl alcohol wipe down and performed with single use sterile prep pads.   Functional dry needling to B lower back performed as follows:  1) 50 mm needle inserted 2 finger breadths lateral (on L) to lower border of midline of L2 spinous process using 45* anteromedial and slightly caudad angulation to lumbar multifidi/rotatores  2)  50 mm needle inserted 2 finger breadths lateral ( on R) to lower border of midline of L3 spinous process using 45* anteromedial and slightly caudad angulation to lumbar multifidi/rotatores  3) 50 mm needle inserted 2 finger breadths lateral (on L) to lower border of midline of L4 spinous process using 45* anteromedial and slightly caudad angulation to lumbar multifidi/rotatores  4) 50 mm needle inserted 2 finger breadths lateral (on R) to lower border of midline of L5 spinous process using 45* anteromedial and slightly caudad angulation to lumbar multifidi/rotatores  5) 50 mm needle inserted 1 finger breadth lateral (R) to the middle of the tip of L3 spinous process using posterior to anterior angulation (longissimus lumborum and multifidi)  6) 50 mm needle inserted 1 finger breadth lateral (L) to the middle of the tip of L2 spinous process using posterior to anterior angulation (longissimus lumborum and multifidi)  7) 50 mm needle inserted 1 finger breadth lateral ( R) to the middle of the tip of L5 spinous process using posterior to anterior angulation (longissimus lumborum and multifidi)  8) 50 mm needle inserted 1 finger breadth lateral (L) to the middle of the tip of L4 spinous process using posterior to anterior angulation (longissimus lumborum and multifidi)  7) 30 mm needle inserted in midline below L2 spinous process with anterosuperior angulation (interspinous ligament)  8) 30 mm needle inserted in midline below L4 spinous process with anterosuperior angulation (interspinous ligament)  9) 75 mm needle inserted approximately 2 finger breadths inferior to the upper middle aspect of the R and the L iliac crest angled perpendicular to the ilium into the gluteus medius trigger point  10) 75 mm needle inserted approximately 2 finger breadths inferior to the upper lateral aspect of the R and L iliac crest angled perpendicular to the ilium (anteriomedial to skin surface) into the gluteus medius trigger point                            Following insertion needles were wound unidirectionally. Treatment performed in prone.   Unsupervised modalities: Electrical Stimulation with FDN x 15 at 80 Hz, 150 microseconds wave frequency (using 2 channels on R and 2 on L). Intensity set to patient tolerance. Patient provided with call bell. Needles left in situ for 15 minutes after completing insertion. Patient instructed in increasing hydration habits following for decreased soreness. No adverse effects present following needling. Patient reported increased muscle tightness, primarily on L following session.    Linda Huddleston, PT

## 2023-07-20 DIAGNOSIS — F43.21 GRIEF: ICD-10-CM

## 2023-07-20 DIAGNOSIS — F41.9 ANXIETY: ICD-10-CM

## 2023-07-20 DIAGNOSIS — G47.00 INSOMNIA, UNSPECIFIED TYPE: ICD-10-CM

## 2023-07-21 ENCOUNTER — TELEPHONE (OUTPATIENT)
Dept: FAMILY MEDICINE | Facility: CLINIC | Age: 38
End: 2023-07-21
Payer: MEDICAID

## 2023-07-21 RX ORDER — ALPRAZOLAM 0.5 MG/1
0.5 TABLET ORAL 3 TIMES DAILY PRN
Qty: 30 TABLET | Refills: 0 | Status: SHIPPED | OUTPATIENT
Start: 2023-07-21 | End: 2023-08-29 | Stop reason: SDUPTHER

## 2023-07-21 NOTE — TELEPHONE ENCOUNTER
Yuliya with pharmacy reports her narcotic score is at 700 and she has ALPRAZolam (XANAX) 0.5 MG tablet. Please advise if ok to fill

## 2023-07-21 NOTE — TELEPHONE ENCOUNTER
----- Message from Tamy Louise sent at 7/21/2023 10:42 AM CDT -----  Type: Needs Medical Advice  Who Called:  Yuliya from [facility]  Symptoms (please be specific):  said she need to speak to the office about her narcotic score is at 700 and she have ALPRAZolam (XANAX) 0.5 MG tablet--please call and advise  Best Call Back Number:   [facility] - [place], [place] - [address]  [address]  [place] [address]  Phone: [phone] Fax: [phone]      Additional Information: thank you

## 2023-07-25 ENCOUNTER — TELEPHONE (OUTPATIENT)
Dept: FAMILY MEDICINE | Facility: CLINIC | Age: 38
End: 2023-07-25
Payer: MEDICAID

## 2023-07-25 NOTE — TELEPHONE ENCOUNTER
Informed pt appt was canceled due to MD having meeting.   Pt was seen by psychiatrist today and they are taking over her anxiety medication at this time. Verbalized understanding. Encouraged pt to contact office if she has further questions or concerns.

## 2023-07-25 NOTE — TELEPHONE ENCOUNTER
----- Message from Pamela Hubbard sent at 7/25/2023  2:12 PM CDT -----  Regarding: Needs Medical Advice  Contact: patient at [phone]  Type: Needs Medical Advice  Who Called:  patient at [phone]    Additional Information: needs to talk about appointment reschedule and next steps for her situation. Please call and advise. Thank you

## 2023-07-26 ENCOUNTER — CLINICAL SUPPORT (OUTPATIENT)
Dept: REHABILITATION | Facility: HOSPITAL | Age: 38
End: 2023-07-26
Payer: MEDICAID

## 2023-07-26 DIAGNOSIS — M79.606 LOW BACK PAIN RADIATING TO LOWER EXTREMITY: ICD-10-CM

## 2023-07-26 DIAGNOSIS — M53.86 DECREASED ROM OF LUMBAR SPINE: ICD-10-CM

## 2023-07-26 DIAGNOSIS — M54.41 CHRONIC MIDLINE LOW BACK PAIN WITH BILATERAL SCIATICA: ICD-10-CM

## 2023-07-26 DIAGNOSIS — Z74.09 IMPAIRED FUNCTIONAL MOBILITY AND ACTIVITY TOLERANCE: Primary | ICD-10-CM

## 2023-07-26 DIAGNOSIS — G89.29 CHRONIC MIDLINE LOW BACK PAIN WITH BILATERAL SCIATICA: ICD-10-CM

## 2023-07-26 DIAGNOSIS — M54.42 CHRONIC MIDLINE LOW BACK PAIN WITH BILATERAL SCIATICA: ICD-10-CM

## 2023-07-26 DIAGNOSIS — M54.50 LOW BACK PAIN RADIATING TO LOWER EXTREMITY: ICD-10-CM

## 2023-07-26 PROCEDURE — 97110 THERAPEUTIC EXERCISES: CPT | Mod: PN

## 2023-07-26 NOTE — PROGRESS NOTES
"[facility]   Physical Therapy Treatment Note     Name: Corina Liu  Clinic Number: [id number]    Therapy Diagnosis:   Encounter Diagnoses   Name Primary?    Impaired functional mobility and activity tolerance Yes    Decreased ROM of lumbar spine     Chronic midline low back pain with bilateral sciatica     Low back pain radiating to lower extremity        Physician: Melva Brito MD    Visit Date: 7/26/2023    Physician Orders: PT Eval and Treat   Medical Diagnosis from Referral: Chronic midline low back pain with bilateral sciatica; Low back pain radiating to lower extremity   Evaluation Date: 5/1/2023  Authorization Period Expiration: 04/18/2024   Plan of Care Expiration: 6/21/2023 (updated to 8/4/2023)  Progress Note Due: 6/21/2023 (updated to 8/4/2023)  Visit # / Visits authorized: 15/ 20 (16 total)   FOTO: Completed 7/5/2023 54% Functional score  Next survey due at discharge     Precautions: cancer     PTA Visit #: 0/5     Time In: 1310  Time Out: 1400  Total Billable Time: 47 minutes    SUBJECTIVE     Pt reports: "I went on a sail boat the other day and it went pretty well.  I can tell this is getting me better."  She was compliant with home exercise program.  Response to previous treatment: "It was spasming for a couple days after the needling but then it felt better."  Functional change: N/A at this time    Pain: 4/10  Location: bilateral back and LLE    OBJECTIVE     Objective Measures updated at progress report unless specified.    Treatment     Corina received the treatments listed below:      therapeutic exercises to develop strength, endurance, ROM, flexibility, posture, and core stabilization for 39 minutes including:  Treadmill 1.7 mph x5' & 3' retro at 0.5mph  Seated hamstring stretch 3x30s ea  Piriformis stretch 3x30s ea   Standing gastroc stretch 3x30s  Multifidus walkouts using blue theraband x8 laps ea  Glute med squat x20  Glute med dips 2x10 " ea   Seated on physioball BlueTheraband postural strengthening x20 ea  Rows w/scapular retractions  B Extension w/scapular retractions  B External rotation w/scapular retractions  Resisted Upper trunk rotation  Anterior/posterior pelvic tilt  Lateral pelvic tilt  Supine core exercises with physioball x 10 ea   Abdominal crunch   Oblique crunch     To be added: review of body mechanics with lifting    Did not perform on this date: (can continue)  *Core side-bending x10*  *Prone press up 3x10s ea*  *Quadruped Bird-dog alternate arm & leg lift x10 ea*  Posterior pelvic tilt 20x3s  Pelvic isometric w/Therabar 5x5s ea  Thoracolumbar AROM 10x3s    Side-bending   Extension  Standing   Core march x10 ea  Pelvic tilts x10 ea   Anterior/posterior   Lateral  Thoracolumbar flexion physioball rollout 10x3s  Middle trap/Rhomboid stretch 5x5s ea  (Seated today) Hooklying Core reverse crunch isometric w/physioball x15 (increase)  Dying bug physioball isometric x15 ea (increase)  Supine lower trunk rotation using physioball 10x3s   DKTC w/physioball 10x3s    manual therapy techniques: Soft tissue Mobilization and myofascial release were applied to the: lumbar paraspinals and bilateral piriformii for 8 minutes, including:  Strumming & Triggerpoint release  Cross hand release    manual therapy techniques: functional dry needling were applied to the: lower back for 12 minutes, including:  Informed consent obtained during prior visit after thorough explanation of risks and benefits. Signed consent form has been scanned into medical records. Pre-procedure time out performed which included verification of name, , and site of treatment. Cleaned target tissues with 70% isopropyl alcohol wipe down and performed with single use sterile prep pads.   Functional dry needling to B lower back performed as follows:  1) 50 mm needle inserted 2 finger breadths lateral (on L) to lower border of midline of L2 spinous process using 45* anteromedial  and slightly caudad angulation to lumbar multifidi/rotatores  2) 50 mm needle inserted 2 finger breadths lateral ( on R) to lower border of midline of L3 spinous process using 45* anteromedial and slightly caudad angulation to lumbar multifidi/rotatores  3) 50 mm needle inserted 2 finger breadths lateral (on L) to lower border of midline of L4 spinous process using 45* anteromedial and slightly caudad angulation to lumbar multifidi/rotatores  4) 50 mm needle inserted 2 finger breadths lateral (on R) to lower border of midline of L5 spinous process using 45* anteromedial and slightly caudad angulation to lumbar multifidi/rotatores  5) 50 mm needle inserted 1 finger breadth lateral (R) to the middle of the tip of L3 spinous process using posterior to anterior angulation (longissimus lumborum and multifidi)  6) 50 mm needle inserted 1 finger breadth lateral (L) to the middle of the tip of L2 spinous process using posterior to anterior angulation (longissimus lumborum and multifidi)  7) 50 mm needle inserted 1 finger breadth lateral ( R) to the middle of the tip of L5 spinous process using posterior to anterior angulation (longissimus lumborum and multifidi)  8) 50 mm needle inserted 1 finger breadth lateral (L) to the middle of the tip of L4 spinous process using posterior to anterior angulation (longissimus lumborum and multifidi)  7) 30 mm needle inserted in midline below L2 spinous process with anterosuperior angulation (interspinous ligament)  8) 30 mm needle inserted in midline below L4 spinous process with anterosuperior angulation (interspinous ligament)  9) 75 mm needle inserted approximately 2 finger breadths inferior to the upper middle aspect of the R and the L iliac crest angled perpendicular to the ilium into the gluteus medius trigger point  10) 75 mm needle inserted approximately 2 finger breadths inferior to the upper lateral aspect of the R and L iliac crest angled perpendicular to the ilium  (anteriomedial to skin surface) into the gluteus medius trigger point                           Following insertion needles were wound unidirectionally. Treatment performed in prone.   Unsupervised modalities: Electrical Stimulation with FDN x 15 at 80 Hz, 150 microseconds wave frequency (using 2 channels on R and 2 on L). Intensity set to patient tolerance. Patient provided with call bell. Needles left in situ for 15 minutes after completing insertion. Patient instructed in increasing hydration habits following for decreased soreness. No adverse effects present following needling. Patient reported increased muscle tightness, primarily on L following session    supervised modalities after being cleared for contradictions: Pre-modulated Electrical Stimulation:  Corina received Pre-modulated Electrical Stimulation for pain control applied to the paraspinals in two different channels on either side of the lumbosacral region. Patient prone with half bolster underneath abdomen/ASIS. Pt received stimulation at 100% scan at a frequency of 4000Hz for 15 minutes. Corina tolerated treatment in prone lying well without any adverse effects.      hot pack in conjunction with ESTIM modalities to lumbosacral region for pain reduction and soft tissue relaxation.    Patient Education and Home Exercises     Home Exercises Provided and Patient Education Provided     Education provided:   - The patient was instructed in floor <> thigh lift and lift with turn.      Written Home Exercises Provided: Patient instructed to cont prior HEP. Exercises were reviewed and Corina was able to demonstrate them prior to the end of the session. Corina demonstrated fair  understanding of the education provided. See EMR under Patient Instructions for exercises provided during therapy sessions.    ASSESSMENT     Corina presents to physical therapy reporting increased muscle spasms following previous visit but with decreased pain and increased  activity tolerance since spasms diminished.  She demonstrated appropriate knowledge of floor <> thigh lift mechanics but not when turn was added as she demonstrated tendency to twist at her lower back.  She corrected this following instruction. During soft tissue mobilization it was noted that patient demonstrates less paraspinal muscle tension and tenderness compared to prior visits.     Corina Is progressing slowly towards her goals.   Pt prognosis is Fair.     Pt will continue to benefit from skilled outpatient physical therapy to address the deficits listed in the problem list box on initial evaluation, provide pt/family education and to maximize pt's level of independence in the home and community environment.     Pt's spiritual, cultural, and educational needs considered and pt agreeable to plan of care and goals.     Anticipated barriers to physical therapy: Significant grief, long term presence of symptoms.      Goals:  Previous Short Term Goals Status:     1) Decrease max pain to < 8/10 Progressing  2) Facilitate improved LE flexibility Progressing  3) Facilitate improved Posture Progressing  4) Patient will perform sit > stand transfer after sitting > 30 min with minimal UE support 5 of 10 trials Met 6/22/2023     New Short Term Goals Status:     #1-3 continue  #4) Modify: Patient will consistently perform sit < > stand transfers with no more than minimal UE support Progressing  5) Patient will walk > 15 min without gait deviations or increased back pain. Progressing     Long Term Goal Status: continue per initial plan of care.  1) Increase walking tolerance to > 30 min Slowly progressing              2) Patient will sit > 45 min without increased LE paresthesias Progressing              3) Patient will bend to perform LE dressing without increased back/LE pain Progressing              4) Patient will sweep/mop > 20 min without increased back/LE pain Slowly progressing              5) Improve functional  deficit to < 40% Progressing              6) Patient will be independent in HEP for flexibility, trunk/lumbar ROM, core strength, and postural correction Progressing    PLAN     POC: 2 times per week for 6 weeks:  Electrical Stimulation IFC/pre-mod/TENS, Manual Therapy, Moist Heat/ Ice, Patient Education, Therapeutic Activities, Therapeutic Exercise, and Functional Dry Needling.    Review proper body mechanics for floor <> thigh lift, lift with turn, overhead lift and carry.        Linda Huddleston, PT

## 2023-07-28 ENCOUNTER — CLINICAL SUPPORT (OUTPATIENT)
Dept: REHABILITATION | Facility: HOSPITAL | Age: 38
End: 2023-07-28
Payer: MEDICAID

## 2023-07-28 DIAGNOSIS — M79.606 LOW BACK PAIN RADIATING TO LOWER EXTREMITY: ICD-10-CM

## 2023-07-28 DIAGNOSIS — M54.42 CHRONIC MIDLINE LOW BACK PAIN WITH BILATERAL SCIATICA: ICD-10-CM

## 2023-07-28 DIAGNOSIS — Z74.09 IMPAIRED FUNCTIONAL MOBILITY AND ACTIVITY TOLERANCE: Primary | ICD-10-CM

## 2023-07-28 DIAGNOSIS — M54.41 CHRONIC MIDLINE LOW BACK PAIN WITH BILATERAL SCIATICA: ICD-10-CM

## 2023-07-28 DIAGNOSIS — M54.50 LOW BACK PAIN RADIATING TO LOWER EXTREMITY: ICD-10-CM

## 2023-07-28 DIAGNOSIS — G89.29 CHRONIC MIDLINE LOW BACK PAIN WITH BILATERAL SCIATICA: ICD-10-CM

## 2023-07-28 DIAGNOSIS — M53.86 DECREASED ROM OF LUMBAR SPINE: ICD-10-CM

## 2023-07-28 PROCEDURE — 97110 THERAPEUTIC EXERCISES: CPT | Mod: PN,CQ

## 2023-07-28 NOTE — PROGRESS NOTES
"[facility]   Physical Therapy Treatment Note     Name: Corina Liu  Clinic Number: [id number]    Therapy Diagnosis:   Encounter Diagnoses   Name Primary?    Impaired functional mobility and activity tolerance Yes    Decreased ROM of lumbar spine     Chronic midline low back pain with bilateral sciatica     Low back pain radiating to lower extremity        Physician: Melva Brito MD    Visit Date: 7/28/2023    Physician Orders: PT Eval and Treat   Medical Diagnosis from Referral: Chronic midline low back pain with bilateral sciatica; Low back pain radiating to lower extremity   Evaluation Date: 5/1/2023  Authorization Period Expiration: 04/18/2024   Plan of Care Expiration: 6/21/2023 (updated to 8/4/2023)  Progress Note Due: 6/21/2023 (updated to 8/4/2023)  Visit # / Visits authorized: 16/ 20 (17 total)   FOTO: Completed 7/28/2023 60% Functional score  Next survey due at discharge     Precautions: cancer     PTA Visit #: 1/5     Time In: 1239  Time Out: 1323  Total Billable Time: 44 minutes    SUBJECTIVE     Pt reports: "I am tired. I woke up and came straight here."  She was compliant with home exercise program.  Response to previous treatment: "It was okay."  Functional change: N/A at this time    Pain: 3/10  Location: bilateral back and LLE    OBJECTIVE     Objective Measures updated at progress report unless specified.    Treatment     Corina received the treatments listed below:      therapeutic exercises to develop strength, endurance, ROM, flexibility, posture, and core stabilization for 44 minutes including:  Treadmill incline 2% 1.8 mph x5' & 5' retro at 1mph  Seated hamstring stretch 3x30s ea  Piriformis stretch 3x30s ea   Standing gastroc stretch 3x30s  Multifidus walkouts using blue theraband x10 laps ea  Glute med squat x20  Glute med dips 2x10 ea   Seated on physioball BlueTheraband postural strengthening x20 ea  Rows w/scapular retractions  B Extension " w/scapular retractions  B External rotation w/scapular retractions  Resisted Upper trunk rotation using blue theraband x20 ea  Anterior/posterior pelvic tilt  Lateral pelvic tilt    *Reviewed lift from floor to waist level & vice versa, carry & turning with load, overhead lifting with proper mechanics for bilateral UE & single UE*    Did not perform on this date: (can continue & progress)  *Supine core exercises with physioball x10 ea*   Abdominal crunch   Oblique crunch  *Core side-bending x10*  *Prone press up 3x10s ea*  *Quadruped Bird-dog alternate arm & leg lift x10 ea*  Thoracolumbar AROM 10x3s    Side-bending   Extension  *Core march seated on physioball x10 ea*  Thoracolumbar flexion physioball rollout 10x3s  Middle trap/Rhomboid stretch 5x5s ea  Supine lower trunk rotation using physioball 10x3s   DKTC w/physioball 10x3s    manual therapy techniques: Soft tissue Mobilization and myofascial release were applied to the: lumbar paraspinals and bilateral piriformii for 0 minutes, including:  Strumming & Triggerpoint release  Cross hand release    manual therapy techniques: functional dry needling were applied to the: lower back for 12 minutes, including:  Informed consent obtained during prior visit after thorough explanation of risks and benefits. Signed consent form has been scanned into medical records. Pre-procedure time out performed which included verification of name, , and site of treatment. Cleaned target tissues with 70% isopropyl alcohol wipe down and performed with single use sterile prep pads.   Functional dry needling to B lower back performed as follows:  1) 50 mm needle inserted 2 finger breadths lateral (on L) to lower border of midline of L2 spinous process using 45* anteromedial and slightly caudad angulation to lumbar multifidi/rotatores  2) 50 mm needle inserted 2 finger breadths lateral ( on R) to lower border of midline of L3 spinous process using 45* anteromedial and slightly caudad  angulation to lumbar multifidi/rotatores  3) 50 mm needle inserted 2 finger breadths lateral (on L) to lower border of midline of L4 spinous process using 45* anteromedial and slightly caudad angulation to lumbar multifidi/rotatores  4) 50 mm needle inserted 2 finger breadths lateral (on R) to lower border of midline of L5 spinous process using 45* anteromedial and slightly caudad angulation to lumbar multifidi/rotatores  5) 50 mm needle inserted 1 finger breadth lateral (R) to the middle of the tip of L3 spinous process using posterior to anterior angulation (longissimus lumborum and multifidi)  6) 50 mm needle inserted 1 finger breadth lateral (L) to the middle of the tip of L2 spinous process using posterior to anterior angulation (longissimus lumborum and multifidi)  7) 50 mm needle inserted 1 finger breadth lateral ( R) to the middle of the tip of L5 spinous process using posterior to anterior angulation (longissimus lumborum and multifidi)  8) 50 mm needle inserted 1 finger breadth lateral (L) to the middle of the tip of L4 spinous process using posterior to anterior angulation (longissimus lumborum and multifidi)  7) 30 mm needle inserted in midline below L2 spinous process with anterosuperior angulation (interspinous ligament)  8) 30 mm needle inserted in midline below L4 spinous process with anterosuperior angulation (interspinous ligament)  9) 75 mm needle inserted approximately 2 finger breadths inferior to the upper middle aspect of the R and the L iliac crest angled perpendicular to the ilium into the gluteus medius trigger point  10) 75 mm needle inserted approximately 2 finger breadths inferior to the upper lateral aspect of the R and L iliac crest angled perpendicular to the ilium (anteriomedial to skin surface) into the gluteus medius trigger point                           Following insertion needles were wound unidirectionally. Treatment performed in prone.   Unsupervised modalities: Electrical  Stimulation with FDN x 15 at 80 Hz, 150 microseconds wave frequency (using 2 channels on R and 2 on L). Intensity set to patient tolerance. Patient provided with call bell. Needles left in situ for 15 minutes after completing insertion. Patient instructed in increasing hydration habits following for decreased soreness. No adverse effects present following needling. Patient reported increased muscle tightness, primarily on L following session    supervised modalities after being cleared for contradictions: Pre-modulated Electrical Stimulation:  Corina received Pre-modulated Electrical Stimulation for pain control applied to the paraspinals in two different channels on either side of the lumbosacral region. Patient prone with half bolster underneath abdomen/ASIS. Pt received stimulation at 100% scan at a frequency of 4000Hz for 15 minutes. Corina tolerated treatment in prone lying well without any adverse effects.      hot pack in conjunction with ESTIM modalities to lumbosacral region for pain reduction and soft tissue relaxation.    Patient Education and Home Exercises     Home Exercises Provided and Patient Education Provided     Education provided:   - Body mechanics were reviewed with the patient as stated above in bold print.    Written Home Exercises Provided: Patient instructed to cont prior HEP. Exercises were reviewed and Corina was able to demonstrate them prior to the end of the session. Corina demonstrated fair  understanding of the education provided. See EMR under Patient Instructions for exercises provided during therapy sessions.    ASSESSMENT     The patient reported to physical therapy stating decreased pain scale, and reducing muscle spasms. Corina Liu was progressed with increased exercise complexity as stated above and body mechanics were reviewed. Patient was able to demonstrate properly following demonstration. Noe is experiencing trigger-point in right lumbar paraspinal, and states  might try to see a massage therapist. Patient request the end of treatment due outstanding factors.    Corina Is progressing slowly towards her goals.   Pt prognosis is Fair.     Pt will continue to benefit from skilled outpatient physical therapy to address the deficits listed in the problem list box on initial evaluation, provide pt/family education and to maximize pt's level of independence in the home and community environment.     Pt's spiritual, cultural, and educational needs considered and pt agreeable to plan of care and goals.     Anticipated barriers to physical therapy: Significant grief, long term presence of symptoms.      Goals:  Previous Short Term Goals Status:     1) Decrease max pain to < 8/10 Progressing  2) Facilitate improved LE flexibility Progressing  3) Facilitate improved Posture Progressing  4) Patient will perform sit > stand transfer after sitting > 30 min with minimal UE support 5 of 10 trials Met 6/22/2023     New Short Term Goals Status:     #1-3 continue  #4) Modify: Patient will consistently perform sit < > stand transfers with no more than minimal UE support Progressing  5) Patient will walk > 15 min without gait deviations or increased back pain. Progressing     Long Term Goal Status: continue per initial plan of care.  1) Increase walking tolerance to > 30 min Slowly progressing              2) Patient will sit > 45 min without increased LE paresthesias Progressing              3) Patient will bend to perform LE dressing without increased back/LE pain Progressing              4) Patient will sweep/mop > 20 min without increased back/LE pain Slowly progressing              5) Improve functional deficit to < 40% Progressing              6) Patient will be independent in HEP for flexibility, trunk/lumbar ROM, core strength, and postural correction Progressing    PLAN     POC: 2 times per week for 6 weeks:  Electrical Stimulation IFC/pre-mod/TENS, Manual Therapy, Moist Heat/ Ice,  Patient Education, Therapeutic Activities, Therapeutic Exercise, and Functional Dry Needling.    Prepare patient for upcoming discharge in subsequent session. Can progress core strengthening. Plan to provide increased HEP prior to discharge.    Emilie Radford, PTA

## 2023-08-02 ENCOUNTER — CLINICAL SUPPORT (OUTPATIENT)
Dept: REHABILITATION | Facility: HOSPITAL | Age: 38
End: 2023-08-02
Payer: MEDICAID

## 2023-08-02 DIAGNOSIS — M79.606 LOW BACK PAIN RADIATING TO LOWER EXTREMITY: ICD-10-CM

## 2023-08-02 DIAGNOSIS — M54.50 LOW BACK PAIN RADIATING TO LOWER EXTREMITY: ICD-10-CM

## 2023-08-02 DIAGNOSIS — M54.41 CHRONIC MIDLINE LOW BACK PAIN WITH BILATERAL SCIATICA: ICD-10-CM

## 2023-08-02 DIAGNOSIS — M53.86 DECREASED ROM OF LUMBAR SPINE: ICD-10-CM

## 2023-08-02 DIAGNOSIS — G89.29 CHRONIC MIDLINE LOW BACK PAIN WITH BILATERAL SCIATICA: ICD-10-CM

## 2023-08-02 DIAGNOSIS — Z74.09 IMPAIRED FUNCTIONAL MOBILITY AND ACTIVITY TOLERANCE: Primary | ICD-10-CM

## 2023-08-02 DIAGNOSIS — M54.42 CHRONIC MIDLINE LOW BACK PAIN WITH BILATERAL SCIATICA: ICD-10-CM

## 2023-08-02 PROCEDURE — 97110 THERAPEUTIC EXERCISES: CPT | Mod: PN

## 2023-08-02 NOTE — PROGRESS NOTES
"[facility]   Physical Therapy Treatment Note/Discharge Summary     Name: Corina Liu  Clinic Number: [id number]    Therapy Diagnosis:   Encounter Diagnoses   Name Primary?    Impaired functional mobility and activity tolerance Yes    Decreased ROM of lumbar spine     Chronic midline low back pain with bilateral sciatica     Low back pain radiating to lower extremity        Physician: Melva Brito MD    Visit Date: 8/2/2023    Physician Orders: PT Eval and Treat   Medical Diagnosis from Referral: Chronic midline low back pain with bilateral sciatica; Low back pain radiating to lower extremity   Evaluation Date: 5/1/2023  Authorization Period Expiration: 04/18/2024   Plan of Care Expiration: 6/21/2023 (updated to 8/4/2023)  Progress Note Due: 6/21/2023 (updated to 8/4/2023)  Visit # / Visits authorized: 17/ 20 (18 total)   Visits canceled: 6  Visits no showed: 2  FOTO: Completed 7/28/2023 60% Functional score  Next survey due at discharge     Precautions: cancer     PTA Visit #: 0/5     Time In: 1406  Time Out: 1504  Total Billable Time: 53 minutes    SUBJECTIVE     Pt reports: I'm exhausted.  My phone has been blowing up since yesterday."  She was not compliant with home exercise program due conflicting schedules.  Response to previous treatment: "It was okay."  Functional change: Increased sitting, standing and walking tolerance    Update:   Pain:  Current 5/10, worst 7-8/10, best 3/10   Location: bilateral lower back with R extending up a little more than L.       Current Level of Function: Improved walking tolerance (~ 20-30 minutes), able to sweep/mop but with less difficulty but with increased pain after, standing tolerance improved but remains limited by lower back pain.  LE paresthesias with prolonged sitting on firm surface, able to perform sit <> stand transfers without significant difficulty, car transfers without significant.     OBJECTIVE     Range of " Motion/Strength:   Thoracic/Lumbar AROM: Pain/Dysfunction with Movement:   Flexion                       65* - 15* = 40*  Guarded   Extension                  20* - 5* = 15*  Increased sacral pain   Right side bending    30*     Left side bending      30*     Right rotation            85%     Left rotation              85%     LE ROM Grossly WFL B              Strength: B LE grossly 4+/5; knee 4+/5; ankle R 4+/5, L 4+/5     Flexibility: Hamstring length 160* B; piriformis minimal tightness B; hip flexors WNL, calves minimal tightness B.      Treatment     Corina received the treatments listed below:      therapeutic exercises to develop strength, endurance, ROM, flexibility, posture, and core stabilization for 53 minutes including:  Treadmill incline 2% 1.8 mph x 5' & 5' retro at 1mph  Seated hamstring stretch 3x30s ea  Piriformis stretch 3x30s ea   Standing gastroc stretch 3x30s  Multifidus walkouts using blue theraband x10 laps ea  Glute med squat x20  Glute med dips 2x10 ea   Seated on physioball BlueTheraband postural strengthening x20 ea  Rows w/scapular retractions  B Extension w/scapular retractions  B External rotation w/scapular retractions  Resisted Upper trunk rotation using blue theraband x20 ea  Shoulder flexion (Y) with isometric trunk extension  Anterior/posterior pelvic tilt  Lateral pelvic tilt  Core march seated on physioball x10 ea  Quadruped Bird-dog alternate arm & leg lift x10 ea   Cat/camel x 10    *Reviewed lift from floor <> waist level, carry & turning with load, overhead lifting with proper mechanics for bilateral UE & single UE, reaching into oven, mopping/sweeping (recommend using dust mop to reduce trunk rotation), use of vacuum avoiding excessive rotation    Patient Education and Home Exercises     Home Exercises Provided and Patient Education Provided     Education provided:   - Body mechanics were reviewed with the patient as stated above in bold print and added resisted shoulder  flexion, quadruped cat/camel.    Written Home Exercises Provided: Patient instructed to cont prior HEP. Exercises were reviewed and Corina was able to demonstrate them prior to the end of the session. Corina demonstrated fair  understanding of the education provided. See EMR under Patient Instructions for exercises provided during therapy sessions.    ASSESSMENT     Patient has made slow but steady progress in PT with improved pain, improved trunk/lumbar ROM, increased LE flexibility, and decreased tenderness/excess muscle tension.  She demonstrates ability to perform HEP with minimal cueing.  Progress has reached a plateau.      Corina made fair progress toward her goals.   Pt prognosis is Fair.     Pt will no longer benefit from skilled outpatient physical therapy as progress has reached a plateau.     Pt's spiritual, cultural, and educational needs considered and pt agreeable to plan of care and goals.     Anticipated barriers to physical therapy: Significant grief, long term presence of symptoms.      Goals:  Short Term Goals Status:     1) Decrease max pain to < 8/10 Met 8/2/2023  2) Facilitate improved LE flexibility  Met 8/2/2023  3) Facilitate improved Posture  Met 8/2/2023  4) Patient will perform sit > stand transfer after sitting > 30 min with minimal UE support 5 of 10 trials Met 6/22/2023     #4) Modify: Patient will consistently perform sit < > stand transfers with no more than minimal UE support  Met 8/2/2023  5) Patient will walk > 15 min without gait deviations or increased back pain.  Met 8/2/2023     Long Term Goal Status:   1) Increase walking tolerance to > 30 min Partially met              2) Patient will sit > 45 min without increased LE paresthesias Partially met              3) Patient will bend to perform LE dressing without increased back/LE pain Partially met              4) Patient will sweep/mop > 20 min without increased back/LE pain Partially met              5) Improve functional  deficit to < 40% Partially met              6) Patient will be independent in HEP for flexibility, trunk/lumbar ROM, core strength, and postural correction Met 8/2/2023    PLAN     Discharge from skilled PT services.  Patient to continue HEP.  Provided theraband for home use.     Linda Huddleston, PT

## 2023-08-06 DIAGNOSIS — F90.2 ATTENTION DEFICIT HYPERACTIVITY DISORDER (ADHD), COMBINED TYPE: ICD-10-CM

## 2023-08-06 DIAGNOSIS — A60.04 HERPES SIMPLEX VULVOVAGINITIS: ICD-10-CM

## 2023-08-06 DIAGNOSIS — Z79.899 ENCOUNTER FOR LONG-TERM CURRENT USE OF MEDICATION: ICD-10-CM

## 2023-08-07 RX ORDER — VALACYCLOVIR HYDROCHLORIDE 1 G/1
1000 TABLET, FILM COATED ORAL DAILY
Qty: 20 TABLET | Refills: 0 | Status: SHIPPED | OUTPATIENT
Start: 2023-08-07 | End: 2023-12-05 | Stop reason: SDUPTHER

## 2023-08-07 RX ORDER — LISDEXAMFETAMINE DIMESYLATE 60 MG/1
60 CAPSULE ORAL EVERY MORNING
Qty: 30 CAPSULE | Refills: 0 | Status: SHIPPED | OUTPATIENT
Start: 2023-08-07 | End: 2023-12-08 | Stop reason: SDUPTHER

## 2023-08-11 ENCOUNTER — OFFICE VISIT (OUTPATIENT)
Dept: URGENT CARE | Facility: CLINIC | Age: 38
End: 2023-08-11
Payer: MEDICAID

## 2023-08-11 VITALS
SYSTOLIC BLOOD PRESSURE: 116 MMHG | BODY MASS INDEX: 31.64 KG/M2 | HEART RATE: 98 BPM | DIASTOLIC BLOOD PRESSURE: 82 MMHG | RESPIRATION RATE: 16 BRPM | TEMPERATURE: 98 F | WEIGHT: 162 LBS | OXYGEN SATURATION: 99 %

## 2023-08-11 DIAGNOSIS — Z20.822 LAB TEST NEGATIVE FOR COVID-19 VIRUS: ICD-10-CM

## 2023-08-11 DIAGNOSIS — Z20.822 EXPOSURE TO COVID-19 VIRUS: Primary | ICD-10-CM

## 2023-08-11 LAB
CTP QC/QA: YES
SARS-COV-2 AG RESP QL IA.RAPID: NEGATIVE

## 2023-08-11 PROCEDURE — 87811 SARS-COV-2 COVID19 W/OPTIC: CPT | Mod: QW,S$GLB,, | Performed by: STUDENT IN AN ORGANIZED HEALTH CARE EDUCATION/TRAINING PROGRAM

## 2023-08-11 PROCEDURE — 99203 PR OFFICE/OUTPT VISIT, NEW, LEVL III, 30-44 MIN: ICD-10-PCS | Mod: S$GLB,,, | Performed by: STUDENT IN AN ORGANIZED HEALTH CARE EDUCATION/TRAINING PROGRAM

## 2023-08-11 PROCEDURE — 99203 OFFICE O/P NEW LOW 30 MIN: CPT | Mod: S$GLB,,, | Performed by: STUDENT IN AN ORGANIZED HEALTH CARE EDUCATION/TRAINING PROGRAM

## 2023-08-11 PROCEDURE — 87811 SARS CORONAVIRUS 2 ANTIGEN POCT, MANUAL READ: ICD-10-PCS | Mod: QW,S$GLB,, | Performed by: STUDENT IN AN ORGANIZED HEALTH CARE EDUCATION/TRAINING PROGRAM

## 2023-08-11 NOTE — PROGRESS NOTES
Subjective:      Patient ID: Corina Liu is a 38 y.o. female.    Vitals:  weight is 73.5 kg (162 lb). Her oral temperature is 98.4 °F (36.9 °C). Her blood pressure is 116/82 and her pulse is 98. Her respiration is 16 and oxygen saturation is 99%.     Chief Complaint: exposure to covid (Exposed to covid and wants a test.  )    Patient is a 38-year-old female with past medical history of PTSD, anxiety, hepatitis-C, thyroid disease, thyroid cancer, GERD, PCOS, mi, and COPD who presents to clinic for COVID testing.  Patient reports she is not vaccinated for COVID.  Patient reports positive COVID exposure.  Patient reports no acute symptoms.  Patient states she has to attend a  tomorrow and wants to be sure she does not have COVID.  Patient states she feels fine.        Constitution: Negative. Negative for chills, sweating, fatigue and fever.   HENT: Negative.  Negative for ear pain, congestion and sore throat.    Neck: neck negative.   Cardiovascular: Negative.  Negative for chest pain and palpitations.   Eyes: Negative.    Respiratory: Negative.  Negative for chest tightness, cough and shortness of breath.    Gastrointestinal: Negative.  Negative for abdominal pain, nausea, vomiting and diarrhea.   Endocrine: negative.   Genitourinary: Negative.    Musculoskeletal: Negative.  Negative for muscle ache.   Skin: Negative.  Negative for color change, pale, rash and erythema.   Allergic/Immunologic: Negative.    Neurological: Negative.  Negative for dizziness, light-headedness, passing out, headaches, disorientation and altered mental status.   Hematologic/Lymphatic: Negative.    Psychiatric/Behavioral: Negative.  Negative for altered mental status, disorientation and confusion.       Objective:     Physical Exam   Constitutional: She is oriented to person, place, and time. She appears well-developed. She is cooperative.  Non-toxic appearance. She does not appear ill. No distress.   HENT:   Head:  Normocephalic and atraumatic.   Ears:   Right Ear: Hearing, tympanic membrane, external ear and ear canal normal.   Left Ear: Hearing, tympanic membrane, external ear and ear canal normal.   Nose: Nose normal. No mucosal edema, rhinorrhea, nasal deformity or congestion. No epistaxis. Right sinus exhibits no maxillary sinus tenderness and no frontal sinus tenderness. Left sinus exhibits no maxillary sinus tenderness and no frontal sinus tenderness.   Mouth/Throat: Uvula is midline, oropharynx is clear and moist and mucous membranes are normal. Mucous membranes are moist. No trismus in the jaw. Normal dentition. No uvula swelling. No oropharyngeal exudate or posterior oropharyngeal erythema. Oropharynx is clear.   Eyes: Conjunctivae and lids are normal. Pupils are equal, round, and reactive to light. Right eye exhibits no discharge. Left eye exhibits no discharge. No scleral icterus.   Neck: Trachea normal and phonation normal. Neck supple. No neck rigidity present.   Cardiovascular: Normal rate, regular rhythm, normal heart sounds and normal pulses.   Pulmonary/Chest: Effort normal and breath sounds normal. No respiratory distress. She has no wheezes. She has no rhonchi. She has no rales.   Abdominal: Normal appearance and bowel sounds are normal. She exhibits no distension. Soft. There is no abdominal tenderness.   Musculoskeletal: Normal range of motion.         General: Normal range of motion.      Cervical back: She exhibits no tenderness.   Lymphadenopathy:     She has no cervical adenopathy.   Neurological: She is alert and oriented to person, place, and time. She exhibits normal muscle tone.   Skin: Skin is warm, dry, intact, not diaphoretic, not pale and no rash. Capillary refill takes less than 2 seconds. No erythema   Psychiatric: Her speech is normal and behavior is normal. Judgment and thought content normal.   Nursing note and vitals reviewed.      Assessment:     1. Exposure to COVID-19 virus    2. Lab  test negative for COVID-19 virus        Plan:       Exposure to COVID-19 virus  -     SARS Coronavirus 2 Antigen, POCT Manual Read    Lab test negative for COVID-19 virus                Labs:  COVID negative.    Symptomatic treatment this point.    Follow-up PCP as needed.    Return to clinic as needed.    To ED for any new acutely worsening symptoms.    Patient in agreement plan of care.    DISCLAIMER: Please note that my documentation in this Electronic Healthcare Record was produced using speech recognition software and therefore may contain errors related to that software system.These could include grammar, punctuation and spelling errors or the inclusion/exclusion of phrases that were not intended. Garbled syntax, mangled pronouns, and other bizarre constructions may be attributed to that software system.

## 2023-08-29 DIAGNOSIS — F41.9 ANXIETY: ICD-10-CM

## 2023-08-29 DIAGNOSIS — F43.21 GRIEF: ICD-10-CM

## 2023-08-29 DIAGNOSIS — G47.00 INSOMNIA, UNSPECIFIED TYPE: ICD-10-CM

## 2023-08-30 RX ORDER — ALPRAZOLAM 0.5 MG/1
0.5 TABLET ORAL 3 TIMES DAILY PRN
Qty: 30 TABLET | Refills: 0 | Status: SHIPPED | OUTPATIENT
Start: 2023-08-30 | End: 2023-09-27 | Stop reason: SDUPTHER

## 2023-09-27 DIAGNOSIS — G47.00 INSOMNIA, UNSPECIFIED TYPE: ICD-10-CM

## 2023-09-27 DIAGNOSIS — F43.21 GRIEF: ICD-10-CM

## 2023-09-27 DIAGNOSIS — F41.9 ANXIETY: ICD-10-CM

## 2023-09-27 RX ORDER — ALPRAZOLAM 0.5 MG/1
0.5 TABLET ORAL 3 TIMES DAILY PRN
Qty: 30 TABLET | Refills: 0 | Status: SHIPPED | OUTPATIENT
Start: 2023-09-27 | End: 2023-12-08 | Stop reason: SDUPTHER

## 2023-11-15 NOTE — ED PROVIDER NOTES
Encounter Date: 2018       History     Chief Complaint   Patient presents with    Vaginal Bleeding     x2days     Pelvic pain with blood on toilet paper for 2 days.            Review of patient's allergies indicates:   Allergen Reactions    Levaquin [levofloxacin] Swelling and Rash     Joint pain, abdominal pain     Past Medical History:   Diagnosis Date    Cancer     Thyroid    Hepatitis C 2016    History of PCOS     Hypoglycemia     Tachycardia     Thyroid disease     hypothyroid, pappilary carcinoma     Past Surgical History:   Procedure Laterality Date     SECTION      x3    HYSTERECTOMY      thyroid nodule biopsy      THYROIDECTOMY      10/17/13    TONSILLECTOMY, ADENOIDECTOMY      TUBAL LIGATION       Family History   Problem Relation Age of Onset    Breast cancer Maternal Grandmother     Ovarian cancer Maternal Grandmother     Cataracts Maternal Grandmother     Cancer Mother         VULVAR AND CERVICAL    Ovarian cancer Mother     Colon cancer Neg Hx      Social History   Substance Use Topics    Smoking status: Current Every Day Smoker     Packs/day: 1.00     Types: Cigarettes    Smokeless tobacco: Never Used    Alcohol use No     Review of Systems   Constitutional: Positive for chills.   HENT: Negative.    Eyes: Negative.    Respiratory: Negative.    Cardiovascular: Negative.    Gastrointestinal: Positive for abdominal pain.        Suprapubic pain and tenderness to moderate palpation   Genitourinary: Positive for dysuria, flank pain, frequency and hematuria.        Pelvic pain, dysuria, frequency, hematuria   Musculoskeletal: Positive for back pain.        Back pain x 24 hrs   Skin: Negative.    Neurological: Negative.    Hematological: Negative.    Psychiatric/Behavioral: Negative.        Physical Exam     Initial Vitals [18 1615]   BP Pulse Resp Temp SpO2   (!) 116/91 98 20 98 °F (36.7 °C) 97 %      MAP       --         Physical Exam    Constitutional: She  appears well-developed and well-nourished.   HENT:   Head: Normocephalic and atraumatic.   Eyes: Conjunctivae and EOM are normal. Pupils are equal, round, and reactive to light.   Neck: Normal range of motion. Neck supple.   Cardiovascular: Normal rate, regular rhythm, normal heart sounds and intact distal pulses.         ED Course   Procedures  Labs Reviewed   URINALYSIS, REFLEX TO URINE CULTURE          Imaging Results    None          Medical Decision Making:   Clinical Tests:   Lab Tests: Ordered and Reviewed  The following lab test(s) were unremarkable: Urinalysis       <> Summary of Lab: Urine remarkable for nitrites, blood, and leukocytes.    ED Management:  Pt was given 200 pyridium and 800 motrin which greatly increased her comfort and decreased both frequency and urgency.  Started initial dose of macrobid and discharged with macrobid 100 bid x 7 days.                       Clinical Impression:   The encounter diagnosis was Cystitis.                             Ryan Chou NP  06/30/18 8795     Initial Cbc (Optional): 3/22/23 Add High Risk Medication Management Associated Diagnosis?: No Comments: Started 4/12/23 Detail Level: Zone Length Of Therapy: 7 months

## 2023-12-05 DIAGNOSIS — F41.9 ANXIETY: ICD-10-CM

## 2023-12-05 DIAGNOSIS — F43.21 GRIEF: ICD-10-CM

## 2023-12-05 DIAGNOSIS — G47.00 INSOMNIA, UNSPECIFIED TYPE: ICD-10-CM

## 2023-12-05 DIAGNOSIS — A60.04 HERPES SIMPLEX VULVOVAGINITIS: ICD-10-CM

## 2023-12-05 NOTE — TELEPHONE ENCOUNTER
No care due was identified.  [facility] Embedded Care Due Messages. Reference number: [id number].   12/05/2023 8:40:30 AM CST

## 2023-12-06 ENCOUNTER — PATIENT MESSAGE (OUTPATIENT)
Dept: FAMILY MEDICINE | Facility: CLINIC | Age: 38
End: 2023-12-06
Payer: MEDICAID

## 2023-12-06 RX ORDER — VALACYCLOVIR HYDROCHLORIDE 1 G/1
1000 TABLET, FILM COATED ORAL DAILY
Qty: 20 TABLET | Refills: 0 | Status: SHIPPED | OUTPATIENT
Start: 2023-12-06

## 2023-12-06 RX ORDER — ALPRAZOLAM 0.5 MG/1
0.5 TABLET ORAL 3 TIMES DAILY PRN
Qty: 30 TABLET | Refills: 0 | OUTPATIENT
Start: 2023-12-06 | End: 2023-12-16

## 2023-12-08 ENCOUNTER — LAB VISIT (OUTPATIENT)
Dept: LAB | Facility: HOSPITAL | Age: 38
End: 2023-12-08
Attending: NURSE PRACTITIONER
Payer: MEDICAID

## 2023-12-08 ENCOUNTER — OFFICE VISIT (OUTPATIENT)
Dept: FAMILY MEDICINE | Facility: CLINIC | Age: 38
End: 2023-12-08
Payer: MEDICAID

## 2023-12-08 ENCOUNTER — PATIENT OUTREACH (OUTPATIENT)
Dept: ADMINISTRATIVE | Facility: HOSPITAL | Age: 38
End: 2023-12-08
Payer: MEDICAID

## 2023-12-08 ENCOUNTER — TELEPHONE (OUTPATIENT)
Dept: FAMILY MEDICINE | Facility: CLINIC | Age: 38
End: 2023-12-08

## 2023-12-08 VITALS
OXYGEN SATURATION: 99 % | BODY MASS INDEX: 30.21 KG/M2 | HEART RATE: 70 BPM | WEIGHT: 154.69 LBS | SYSTOLIC BLOOD PRESSURE: 111 MMHG | DIASTOLIC BLOOD PRESSURE: 71 MMHG

## 2023-12-08 DIAGNOSIS — E53.8 VITAMIN B12 DEFICIENCY NEUROPATHY: ICD-10-CM

## 2023-12-08 DIAGNOSIS — F41.9 ANXIETY: ICD-10-CM

## 2023-12-08 DIAGNOSIS — G47.00 INSOMNIA, UNSPECIFIED TYPE: ICD-10-CM

## 2023-12-08 DIAGNOSIS — Z02.83 ENCOUNTER FOR DRUG SCREENING: Primary | ICD-10-CM

## 2023-12-08 DIAGNOSIS — M54.42 CHRONIC MIDLINE LOW BACK PAIN WITH BILATERAL SCIATICA: ICD-10-CM

## 2023-12-08 DIAGNOSIS — G63 VITAMIN B12 DEFICIENCY NEUROPATHY: ICD-10-CM

## 2023-12-08 DIAGNOSIS — M54.50 LOW BACK PAIN RADIATING TO LOWER EXTREMITY: ICD-10-CM

## 2023-12-08 DIAGNOSIS — F90.2 ATTENTION DEFICIT HYPERACTIVITY DISORDER (ADHD), COMBINED TYPE: ICD-10-CM

## 2023-12-08 DIAGNOSIS — M79.606 LOW BACK PAIN RADIATING TO LOWER EXTREMITY: ICD-10-CM

## 2023-12-08 DIAGNOSIS — M54.41 CHRONIC MIDLINE LOW BACK PAIN WITH BILATERAL SCIATICA: ICD-10-CM

## 2023-12-08 DIAGNOSIS — F43.21 GRIEF: ICD-10-CM

## 2023-12-08 DIAGNOSIS — G89.29 CHRONIC MIDLINE LOW BACK PAIN WITH BILATERAL SCIATICA: ICD-10-CM

## 2023-12-08 DIAGNOSIS — Z79.899 ENCOUNTER FOR LONG-TERM CURRENT USE OF HIGH RISK MEDICATION: ICD-10-CM

## 2023-12-08 LAB
AMPHET+METHAMPHET UR QL: NEGATIVE
BARBITURATES UR QL SCN>200 NG/ML: NEGATIVE
BENZODIAZ UR QL SCN>200 NG/ML: ABNORMAL
BZE UR QL SCN: NEGATIVE
CANNABINOIDS UR QL SCN: ABNORMAL
CREAT UR-MCNC: 78 MG/DL (ref 15–325)
METHADONE UR QL SCN>300 NG/ML: NEGATIVE
OPIATES UR QL SCN: NEGATIVE
PCP UR QL SCN>25 NG/ML: NEGATIVE
TOXICOLOGY INFORMATION: ABNORMAL

## 2023-12-08 PROCEDURE — 99214 PR OFFICE/OUTPT VISIT, EST, LEVL IV, 30-39 MIN: ICD-10-PCS | Mod: S$PBB,,, | Performed by: NURSE PRACTITIONER

## 2023-12-08 PROCEDURE — 3008F PR BODY MASS INDEX (BMI) DOCUMENTED: ICD-10-PCS | Mod: CPTII,,, | Performed by: NURSE PRACTITIONER

## 2023-12-08 PROCEDURE — 1159F MED LIST DOCD IN RCRD: CPT | Mod: CPTII,,, | Performed by: NURSE PRACTITIONER

## 2023-12-08 PROCEDURE — 3008F BODY MASS INDEX DOCD: CPT | Mod: CPTII,,, | Performed by: NURSE PRACTITIONER

## 2023-12-08 PROCEDURE — 3074F PR MOST RECENT SYSTOLIC BLOOD PRESSURE < 130 MM HG: ICD-10-PCS | Mod: CPTII,,, | Performed by: NURSE PRACTITIONER

## 2023-12-08 PROCEDURE — 80307 DRUG TEST PRSMV CHEM ANLYZR: CPT | Performed by: NURSE PRACTITIONER

## 2023-12-08 PROCEDURE — 3078F PR MOST RECENT DIASTOLIC BLOOD PRESSURE < 80 MM HG: ICD-10-PCS | Mod: CPTII,,, | Performed by: NURSE PRACTITIONER

## 2023-12-08 PROCEDURE — 3074F SYST BP LT 130 MM HG: CPT | Mod: CPTII,,, | Performed by: NURSE PRACTITIONER

## 2023-12-08 PROCEDURE — 82607 VITAMIN B-12: CPT | Performed by: NURSE PRACTITIONER

## 2023-12-08 PROCEDURE — 36415 COLL VENOUS BLD VENIPUNCTURE: CPT | Performed by: NURSE PRACTITIONER

## 2023-12-08 PROCEDURE — 3078F DIAST BP <80 MM HG: CPT | Mod: CPTII,,, | Performed by: NURSE PRACTITIONER

## 2023-12-08 PROCEDURE — 1159F PR MEDICATION LIST DOCUMENTED IN MEDICAL RECORD: ICD-10-PCS | Mod: CPTII,,, | Performed by: NURSE PRACTITIONER

## 2023-12-08 PROCEDURE — 99999 PR PBB SHADOW E&M-EST. PATIENT-LVL III: CPT | Mod: PBBFAC,,, | Performed by: NURSE PRACTITIONER

## 2023-12-08 PROCEDURE — 99213 OFFICE O/P EST LOW 20 MIN: CPT | Mod: PBBFAC,PN | Performed by: NURSE PRACTITIONER

## 2023-12-08 PROCEDURE — 99999 PR PBB SHADOW E&M-EST. PATIENT-LVL III: ICD-10-PCS | Mod: PBBFAC,,, | Performed by: NURSE PRACTITIONER

## 2023-12-08 PROCEDURE — 99214 OFFICE O/P EST MOD 30 MIN: CPT | Mod: S$PBB,,, | Performed by: NURSE PRACTITIONER

## 2023-12-08 RX ORDER — ALPRAZOLAM 0.5 MG/1
0.5 TABLET ORAL 3 TIMES DAILY PRN
Qty: 30 TABLET | Refills: 0 | Status: CANCELLED | OUTPATIENT
Start: 2023-12-08 | End: 2023-12-18

## 2023-12-08 RX ORDER — LISDEXAMFETAMINE DIMESYLATE 60 MG/1
60 CAPSULE ORAL EVERY MORNING
Qty: 30 CAPSULE | Refills: 0 | Status: SHIPPED | OUTPATIENT
Start: 2023-12-08

## 2023-12-08 RX ORDER — ALPRAZOLAM 0.5 MG/1
0.5 TABLET ORAL 3 TIMES DAILY PRN
Qty: 30 TABLET | Refills: 0 | Status: SHIPPED | OUTPATIENT
Start: 2023-12-08 | End: 2023-12-08 | Stop reason: SDUPTHER

## 2023-12-08 RX ORDER — FLUTICASONE PROPIONATE 50 MCG
2 SPRAY, SUSPENSION (ML) NASAL NIGHTLY
Qty: 15.8 ML | Refills: 11 | Status: CANCELLED | OUTPATIENT
Start: 2023-12-08 | End: 2024-12-07

## 2023-12-08 RX ORDER — OXYCODONE AND ACETAMINOPHEN 10; 325 MG/1; MG/1
1 TABLET ORAL EVERY 12 HOURS PRN
Qty: 20 TABLET | Refills: 0 | Status: SHIPPED | OUTPATIENT
Start: 2023-12-08 | End: 2023-12-08 | Stop reason: SDUPTHER

## 2023-12-08 RX ORDER — ONDANSETRON 4 MG/1
TABLET, FILM COATED ORAL
Status: CANCELLED | OUTPATIENT
Start: 2023-12-08

## 2023-12-08 RX ORDER — OXYCODONE AND ACETAMINOPHEN 10; 325 MG/1; MG/1
1 TABLET ORAL EVERY 12 HOURS PRN
Qty: 20 TABLET | Refills: 0 | Status: CANCELLED | OUTPATIENT
Start: 2023-12-08

## 2023-12-08 RX ORDER — ALPRAZOLAM 0.5 MG/1
0.5 TABLET ORAL 3 TIMES DAILY PRN
Qty: 30 TABLET | Refills: 0 | Status: SHIPPED | OUTPATIENT
Start: 2023-12-08 | End: 2024-01-31 | Stop reason: SDUPTHER

## 2023-12-08 RX ORDER — OXYCODONE AND ACETAMINOPHEN 10; 325 MG/1; MG/1
1 TABLET ORAL EVERY 12 HOURS PRN
Qty: 14 TABLET | Refills: 0 | Status: SHIPPED | OUTPATIENT
Start: 2023-12-08

## 2023-12-08 RX ORDER — LISDEXAMFETAMINE DIMESYLATE 60 MG/1
60 CAPSULE ORAL EVERY MORNING
Qty: 30 CAPSULE | Refills: 0 | Status: CANCELLED | OUTPATIENT
Start: 2023-12-08

## 2023-12-08 NOTE — TELEPHONE ENCOUNTER
"Per the patient's chart I only see where there is 1 pain medication in the chart, which is the percocet that was prescribed today.     Called pharmacy for clarification - they state that they need solid reasoning for why the patient is receiving alprazolam, vyvanse, and oxycodone, as it pushes her narc score extremely high.     Read off the dx that were listed on these prescriptions to the pharmacist, who states she will need more validation than that. She states that the patient has been receiving the alprazolam since February for "acute grief".     Explained to pharmacist that today is the first time IRMA Garrett has seen the patient, and that she is normally followed by Dr. Brito, who is her collaborating MD. Will touch base with IRMA Garrett, as one of the providers will need to call and speak with the pharmacist.   "

## 2023-12-08 NOTE — PROGRESS NOTES
Subjective:       Patient ID: Corina Liu is a 38 y.o. female.    Chief Complaint: Follow-up and Medication Refill    Corina Liu presents to the clinic today for medication refills  Established patient within the clinic, new patient to myself  [facility]: [facility]RIST[facility]    Patient Active Problem List  Diagnosis  · Hirsutism  · Thyroid nodule  · Malignant neoplasm of thyroid gland  · Thrombocytopenia  · Thyroid disease  · Nicotine dependence  · COPD (chronic obstructive pulmonary disease)  · Anxiety disorder  · Hypotension  · Marijuana dependence, started age 14  · Excessive caffeine intake  · Elevated TSH  · Dyslipidemia (high LDL; low HDL)  · Abdominal obesity  · Family history of premature CAD  · Mixed stress and urge urinary incontinence  · Whiplash injuries  · Vitamin B12 deficiency neuropathy  · Cervical disc disorder  · Disorder of sacrum  · Lumbosacral radiculopathy  · Thoracic facet syndrome  · Partial thickness rotator cuff tear  · History of thyroidectomy  · Displacement of cervical intervertebral disc  · Gastroesophageal reflux disease  · History of thyroid cancer    Vyvanse:  Reports inconsistency with being able to obtain due to national shortage.  Is due for medication refill     Grief/Insomnia  Had court yesterday in regards to trial of 's murder. Set to potentially start in April.  Reports taking last Xanax last night.   Court/processing has increased anxiety, causing sleep disturbances that are then exacerbated with back pain   She does not take Xanax during the day- history of medication dependence goal is not to increase dose with weaning        +THC on UDS  [employer]: [employer] +THC- purchases at local gas station/orders online        Review of Systems   Constitutional:  Negative for activity change, appetite change and fever.   HENT: Negative.     Eyes: Negative.    Respiratory:  Negative for cough, shortness of breath and  wheezing.    Cardiovascular:  Negative for chest pain, palpitations and leg swelling.   Gastrointestinal:  Negative for abdominal pain, blood in stool, constipation, diarrhea, nausea and vomiting.   Endocrine: Negative.    Genitourinary: Negative.    Musculoskeletal:  Positive for arthralgias, myalgias and neck pain.        Pain in right shoulder, hip, leg pain   Allergic/Immunologic: Negative.    Neurological:  Negative for dizziness, weakness and headaches.   Hematological: Negative.    Psychiatric/Behavioral:  Positive for decreased concentration and sleep disturbance. The patient is nervous/anxious.    All other systems reviewed and are negative.      Patient Active Problem List   Diagnosis    Hirsutism    Thyroid nodule    Malignant neoplasm of thyroid gland    Thrombocytopenia    Thyroid disease    Nicotine dependence    COPD (chronic obstructive pulmonary disease)    Anxiety disorder    Hypotension    Marijuana dependence, started age 14    Excessive caffeine intake    Elevated TSH    Dyslipidemia (high LDL; low HDL)    Abdominal obesity    Family history of premature CAD    Mixed stress and urge urinary incontinence    Whiplash injuries    Vitamin B12 deficiency neuropathy    Cervical disc disorder    Disorder of sacrum    Lumbosacral radiculopathy    Thoracic facet syndrome    Partial thickness rotator cuff tear    History of thyroidectomy    Displacement of cervical intervertebral disc    Gastroesophageal reflux disease    History of thyroid cancer       Objective:      Physical Exam  Vitals and nursing note reviewed.   Constitutional:       General: She is not in acute distress.     Appearance: She is not ill-appearing.   Cardiovascular:      Rate and Rhythm: Regular rhythm.      Heart sounds: No murmur heard.  Pulmonary:      Effort: Pulmonary effort is normal.      Breath sounds: Normal breath sounds. No wheezing.   Skin:     General: Skin is warm and dry.      Findings: No rash.   Neurological:       Mental Status: She is alert and oriented to person, place, and time.   Psychiatric:         Attention and Perception: Attention normal.         Mood and Affect: Mood is depressed. Affect is tearful.         Speech: Speech normal.         Behavior: Behavior normal.         Thought Content: Thought content normal.         Cognition and Memory: Cognition and memory normal.         Judgment: Judgment normal.         Lab Results   Component Value Date    WBC 8.45 04/26/2023    HGB 13.4 04/26/2023    HCT 39.7 04/26/2023     04/26/2023    CHOL 199 12/01/2020    TRIG 91 12/01/2020    HDL 33 (L) 12/01/2020    ALT 19 04/26/2023    AST 25 04/26/2023     04/26/2023    K 4.6 04/26/2023     04/26/2023    CREATININE 0.9 04/26/2023    BUN 14 04/26/2023    CO2 25 04/26/2023    TSH 0.146 (L) 03/28/2023    HGBA1C 5.2 12/01/2020     The ASCVD Risk score (Jim PARRY, et al., 2019) failed to calculate for the following reasons:    The 2019 ASCVD risk score is only valid for ages 40 to 79  Visit Vitals  /71 (BP Location: Left arm, Patient Position: Sitting, BP Method: Medium (Automatic))   Pulse 70   Wt 70.2 kg (154 lb 11.2 oz)   LMP 08/27/2013   SpO2 99%   BMI 30.21 kg/m²      Assessment:       1. Encounter for drug screening    2. Encounter for long-term current use of high risk medication    3. Grief    4. Insomnia, unspecified type    5. Anxiety    6. Attention deficit hyperactivity disorder (ADHD), combined type    7. Vitamin B12 deficiency neuropathy    8. Chronic midline low back pain with bilateral sciatica    9. Low back pain radiating to lower extremity    10. Encounter for long-term current use of medication        Plan:       1. Encounter for drug screening  -     Drug screen panel, in-house    2. Encounter for long-term current use of high risk medication  -     Drug screen panel, in-house   reviewed  UDS obtained   Patient aware of risk vs benefit of medication use. Voiced understanding of  monitoring of medication, aware of s/sx that require immediate attention.   3. Grief  -     ALPRAZolam (XANAX) 0.5 MG tablet; Take 1 tablet (0.5 mg total) by mouth 3 (three) times daily as needed for Anxiety (related to acute grief).  Dispense: 30 tablet; Refill: 0    4. Insomnia, unspecified type  -     ALPRAZolam (XANAX) 0.5 MG tablet; Take 1 tablet (0.5 mg total) by mouth 3 (three) times daily as needed for Anxiety (related to acute grief).  Dispense: 30 tablet; Refill: 0    5. Anxiety  -     ALPRAZolam (XANAX) 0.5 MG tablet; Take 1 tablet (0.5 mg total) by mouth 3 (three) times daily as needed for Anxiety (related to acute grief).  Dispense: 30 tablet; Refill: 0    6. Attention deficit hyperactivity disorder (ADHD), combined type  -     lisdexamfetamine (VYVANSE) 60 MG capsule; Take 1 capsule (60 mg total) by mouth every morning.  Dispense: 30 capsule; Refill: 0    7. Vitamin B12 deficiency neuropathy  -     Vitamin B12; Future; Expected date: 12/08/2023  Obtain lab for review  Historically was receiving in office injections, is open to self administration if needed.   8. Chronic midline low back pain with bilateral sciatica  -     oxyCODONE-acetaminophen (PERCOCET)  mg per tablet; Take 1 tablet by mouth every 12 (twelve) hours as needed for Pain.  Dispense: 20 tablet; Refill: 0    9. Low back pain radiating to lower extremity  -     oxyCODONE-acetaminophen (PERCOCET)  mg per tablet; Take 1 tablet by mouth every 12 (twelve) hours as needed for Pain.  Dispense: 20 tablet; Refill: 0           Follow up in about 3 months (around 3/8/2024).      Future Appointments       Date Provider Specialty Appt Notes    12/8/2023  Lab Lab    3/7/2024 Melva Brito MD Family Medicine Follow Up - Medication Refill

## 2023-12-08 NOTE — TELEPHONE ENCOUNTER
----- Message from Valerie Arias sent at 12/8/2023 11:33 AM CST -----  Type:  Pharmacy Calling to Clarify an RX      Pharmacy [facility]   Prescription Name:  oxyCODONE-acetaminophen (PERCOCET)  mg per tablet  What do they need to clarify?:  needs to clarify why pt is on so many pain medications   Best Call Back Number:   [id number] - [place], [place] - [address]  [address]   [place] [address]  Phone: [phone] Fax: [phone]        Additional Information:  please advise

## 2023-12-09 LAB — VIT B12 SERPL-MCNC: 414 PG/ML (ref 210–950)

## 2024-01-30 ENCOUNTER — OFFICE VISIT (OUTPATIENT)
Dept: URGENT CARE | Facility: CLINIC | Age: 39
End: 2024-01-30
Payer: MEDICAID

## 2024-01-30 VITALS
TEMPERATURE: 99 F | SYSTOLIC BLOOD PRESSURE: 116 MMHG | RESPIRATION RATE: 16 BRPM | HEART RATE: 92 BPM | BODY MASS INDEX: 32.2 KG/M2 | WEIGHT: 164 LBS | DIASTOLIC BLOOD PRESSURE: 74 MMHG | OXYGEN SATURATION: 98 % | HEIGHT: 60 IN

## 2024-01-30 DIAGNOSIS — J02.9 SORE THROAT: Primary | ICD-10-CM

## 2024-01-30 DIAGNOSIS — J02.0 STREP PHARYNGITIS: ICD-10-CM

## 2024-01-30 LAB
CTP QC/QA: YES
FLUAV AG NPH QL: NEGATIVE
FLUBV AG NPH QL: NEGATIVE
S PYO RRNA THROAT QL PROBE: POSITIVE
SARS-COV-2 AG RESP QL IA.RAPID: NEGATIVE

## 2024-01-30 PROCEDURE — 87880 STREP A ASSAY W/OPTIC: CPT | Mod: QW,,, | Performed by: STUDENT IN AN ORGANIZED HEALTH CARE EDUCATION/TRAINING PROGRAM

## 2024-01-30 PROCEDURE — 99214 OFFICE O/P EST MOD 30 MIN: CPT | Mod: S$GLB,,, | Performed by: STUDENT IN AN ORGANIZED HEALTH CARE EDUCATION/TRAINING PROGRAM

## 2024-01-30 PROCEDURE — 87811 SARS-COV-2 COVID19 W/OPTIC: CPT | Mod: QW,S$GLB,, | Performed by: STUDENT IN AN ORGANIZED HEALTH CARE EDUCATION/TRAINING PROGRAM

## 2024-01-30 PROCEDURE — 87804 INFLUENZA ASSAY W/OPTIC: CPT | Mod: QW,,, | Performed by: STUDENT IN AN ORGANIZED HEALTH CARE EDUCATION/TRAINING PROGRAM

## 2024-01-30 RX ORDER — AMOXICILLIN 875 MG/1
875 TABLET, FILM COATED ORAL EVERY 12 HOURS
Qty: 20 TABLET | Refills: 0 | Status: SHIPPED | OUTPATIENT
Start: 2024-01-30 | End: 2024-02-07

## 2024-01-30 RX ORDER — METHYLPREDNISOLONE 4 MG/1
TABLET ORAL
Qty: 21 EACH | Refills: 0 | Status: SHIPPED | OUTPATIENT
Start: 2024-01-30 | End: 2024-02-07

## 2024-01-30 RX ORDER — PROMETHAZINE HYDROCHLORIDE AND DEXTROMETHORPHAN HYDROBROMIDE 6.25; 15 MG/5ML; MG/5ML
5 SYRUP ORAL
Qty: 118 ML | Refills: 0 | Status: SHIPPED | OUTPATIENT
Start: 2024-01-30 | End: 2024-02-09

## 2024-01-30 RX ORDER — BENZOCAINE/MENTHOL 15 MG-10MG
1 LOZENGE MUCOUS MEMBRANE
Qty: 30 LOZENGE | Refills: 0 | Status: SHIPPED | OUTPATIENT
Start: 2024-01-30 | End: 2024-05-06

## 2024-01-30 NOTE — PROGRESS NOTES
Subjective:      Patient ID: Corina Liu is a 38 y.o. female.    Vitals:  height is 5' (1.524 m) and weight is 74.4 kg (164 lb). Her temperature is 99.2 °F (37.3 °C). Her blood pressure is 116/74 and her pulse is 92. Her respiration is 16 and oxygen saturation is 98%.     Chief Complaint: Sore Throat    Patient is a 38-year-old female with past medical history of PTSD, anxiety, hepatitis-C, thyroid disease, thyroid cancer, GERD, PCOS, mi, and COPD who presents to clinic for evaluation of sore throat and URI related symptoms.  Patient reports symptoms x1 day.  Patient reports she has not vaccinated.  Patient reports she has a child positive with strep throat.  Patient states taking over-the-counter medications with no significant relief to symptoms.    Sore Throat   This is a new problem. The current episode started today. The problem has been gradually worsening. Associated symptoms include congestion, coughing, ear pain, headaches and neck pain. Pertinent negatives include no abdominal pain, diarrhea, drooling, ear discharge, shortness of breath or vomiting. She has tried acetaminophen for the symptoms. The treatment provided mild relief.       Constitution: Positive for chills and fatigue.   HENT:  Positive for ear pain, congestion, postnasal drip and sore throat. Negative for ear discharge, tinnitus, hearing loss and drooling.    Neck: Positive for neck pain and painful lymph nodes.   Cardiovascular: Negative.  Negative for chest pain and palpitations.   Eyes: Negative.    Respiratory:  Positive for cough. Negative for chest tightness, sputum production, shortness of breath and wheezing.    Gastrointestinal: Negative.  Negative for abdominal pain, nausea, vomiting and diarrhea.   Endocrine: negative.   Genitourinary: Negative.  Negative for dysuria, frequency and urgency.   Musculoskeletal:  Positive for muscle ache.   Skin: Negative.  Negative for color change, pale, rash and erythema.    Allergic/Immunologic: Negative.    Neurological:  Positive for headaches. Negative for dizziness, light-headedness, passing out, disorientation and altered mental status.   Hematologic/Lymphatic: Positive for swollen lymph nodes.   Psychiatric/Behavioral: Negative.  Negative for altered mental status, disorientation and confusion.       Objective:     Physical Exam   Constitutional: She is oriented to person, place, and time. She appears well-developed. She is cooperative.  Non-toxic appearance. She appears ill. No distress.   HENT:   Head: Normocephalic and atraumatic.   Ears:   Right Ear: Hearing, tympanic membrane, external ear and ear canal normal.   Left Ear: Hearing, tympanic membrane, external ear and ear canal normal.   Nose: Congestion present. No mucosal edema, rhinorrhea or nasal deformity. No epistaxis. Right sinus exhibits no maxillary sinus tenderness and no frontal sinus tenderness. Left sinus exhibits no maxillary sinus tenderness and no frontal sinus tenderness.   Mouth/Throat: Uvula is midline and mucous membranes are normal. Mucous membranes are moist. No trismus in the jaw. Normal dentition. No uvula swelling. Posterior oropharyngeal erythema and cobblestoning present. No oropharyngeal exudate. Oropharynx is clear.   Eyes: Conjunctivae and lids are normal. Pupils are equal, round, and reactive to light. Right eye exhibits no discharge. Left eye exhibits no discharge. No scleral icterus.   Neck: Trachea normal and phonation normal. Neck supple. No neck rigidity present.   Cardiovascular: Normal rate, regular rhythm, normal heart sounds and normal pulses.   Pulmonary/Chest: Effort normal and breath sounds normal. No respiratory distress. She has no wheezes. She has no rhonchi. She has no rales.   Abdominal: Normal appearance and bowel sounds are normal. She exhibits no distension. Soft. There is no abdominal tenderness. There is no rebound, no guarding, no left CVA tenderness and no right CVA  tenderness.   Musculoskeletal: Normal range of motion.         General: Normal range of motion.      Cervical back: She exhibits no tenderness.   Lymphadenopathy:     She has cervical adenopathy.   Neurological: She is alert and oriented to person, place, and time. She exhibits normal muscle tone.   Skin: Skin is warm, dry, intact, not diaphoretic, not pale and no rash. Capillary refill takes less than 2 seconds. No erythema   Psychiatric: Her speech is normal and behavior is normal. Judgment and thought content normal.   Nursing note and vitals reviewed.      Assessment:     1. Sore throat    2. Strep pharyngitis        Plan:       Sore throat  -     SARS Coronavirus 2 Antigen, POCT Manual Read  -     POCT Influenza A/B Rapid Antigen  -     POCT rapid strep A    Strep pharyngitis    Other orders  -     amoxicillin (AMOXIL) 875 MG tablet; Take 1 tablet (875 mg total) by mouth every 12 (twelve) hours. for 10 days  Dispense: 20 tablet; Refill: 0  -     promethazine-dextromethorphan (PROMETHAZINE-DM) 6.25-15 mg/5 mL Syrp; Take 5 mLs by mouth every 4 to 6 hours as needed (Cough).  Dispense: 118 mL; Refill: 0  -     benzocaine-menthoL (CHLORASEPTIC MAX) 15-10 mg Lozg; 1 lozenge by Mucous Membrane route every 2 (two) hours as needed (Sore throat).  Dispense: 30 lozenge; Refill: 0  -     methylPREDNISolone (MEDROL DOSEPACK) 4 mg tablet; use as directed  Dispense: 21 each; Refill: 0                Labs:  Influenza a and B negative.  COVID negative.  Rapid strep positive.  Take medications as prescribed.  Tylenol/Motrin per package instructions for any pain or fever.  Recommend warm salt water gargles every 2-3 hours while awake.  Recommend replacing toothbrush and washing linens in hot water 48 hours after starting antibiotics.  Follow-up with PCP in 1-2 days.  Follow-up ENT as needed.  Return to clinic as needed.  To ED for any new or acutely worsening symptoms.  Patient in agreement with plan of care.    DISCLAIMER:  Please note that my documentation in this Electronic Healthcare Record was produced using speech recognition software and therefore may contain errors related to that software system.These could include grammar, punctuation and spelling errors or the inclusion/exclusion of phrases that were not intended. Garbled syntax, mangled pronouns, and other bizarre constructions may be attributed to that software system.

## 2024-01-31 DIAGNOSIS — G89.29 CHRONIC MIDLINE LOW BACK PAIN WITH BILATERAL SCIATICA: ICD-10-CM

## 2024-01-31 DIAGNOSIS — M54.50 LOW BACK PAIN RADIATING TO LOWER EXTREMITY: ICD-10-CM

## 2024-01-31 DIAGNOSIS — F43.21 GRIEF: ICD-10-CM

## 2024-01-31 DIAGNOSIS — M79.606 LOW BACK PAIN RADIATING TO LOWER EXTREMITY: ICD-10-CM

## 2024-01-31 DIAGNOSIS — M54.41 CHRONIC MIDLINE LOW BACK PAIN WITH BILATERAL SCIATICA: ICD-10-CM

## 2024-01-31 DIAGNOSIS — F41.9 ANXIETY: ICD-10-CM

## 2024-01-31 DIAGNOSIS — M54.42 CHRONIC MIDLINE LOW BACK PAIN WITH BILATERAL SCIATICA: ICD-10-CM

## 2024-01-31 DIAGNOSIS — G47.00 INSOMNIA, UNSPECIFIED TYPE: ICD-10-CM

## 2024-01-31 RX ORDER — OXYCODONE AND ACETAMINOPHEN 10; 325 MG/1; MG/1
1 TABLET ORAL EVERY 12 HOURS PRN
Qty: 14 TABLET | Refills: 0 | Status: CANCELLED | OUTPATIENT
Start: 2024-01-31

## 2024-01-31 RX ORDER — ALPRAZOLAM 0.5 MG/1
0.5 TABLET ORAL 3 TIMES DAILY PRN
Qty: 30 TABLET | Refills: 0 | Status: SHIPPED | OUTPATIENT
Start: 2024-01-31 | End: 2024-05-06 | Stop reason: SDUPTHER

## 2024-01-31 NOTE — TELEPHONE ENCOUNTER
----- Message from Miriam Doyle sent at 1/31/2024  3:06 PM CST -----  Contact: pt [phone]  Type:  RX Refill Request    Who Called:  Pt   Refill or New Rx:  refill  RX Name and Strength:  ALPRAZolam (XANAX) 0.5 MG tablet// oxyCODONE-acetaminophen (PERCOCET)  mg per tablet   How is the patient currently taking it? (ex. 1XDay):  1xday  Is this a 30 day or 90 day RX:  30  Preferred Pharmacy with phone number:    [facility] - [place], [place] - [address]  [address]  [place] [address]  Phone: [phone] Fax: [phone]      Local or Mail Order:  Local   Ordering Provider:  Alisha Woodall Call Back Number:  [phone]      Additional Information:  Pls call back and advise/ Pt stated her insurance runs out today

## 2024-01-31 NOTE — TELEPHONE ENCOUNTER
We do not long term co-prescribe opiates and benzos. Refilling alprazolam. Oxycodone is not meant to be long term medciation

## 2024-01-31 NOTE — TELEPHONE ENCOUNTER
No care due was identified.  [facility] Embedded Care Due Messages. Reference number: [id number].   1/31/2024 3:13:56 PM CST

## 2024-02-02 ENCOUNTER — PATIENT MESSAGE (OUTPATIENT)
Dept: FAMILY MEDICINE | Facility: CLINIC | Age: 39
End: 2024-02-02
Payer: MEDICAID

## 2024-02-02 ENCOUNTER — TELEPHONE (OUTPATIENT)
Dept: FAMILY MEDICINE | Facility: CLINIC | Age: 39
End: 2024-02-02
Payer: MEDICAID

## 2024-02-02 NOTE — TELEPHONE ENCOUNTER
Spoke with patient about 3/7/24 appointment cancellation, she states she will call back to rescheduled.    Chelle Rizvi RMA 02/02/2024 9:22 AM

## 2024-02-07 ENCOUNTER — OFFICE VISIT (OUTPATIENT)
Dept: URGENT CARE | Facility: CLINIC | Age: 39
End: 2024-02-07
Payer: MEDICAID

## 2024-02-07 VITALS
HEIGHT: 60 IN | WEIGHT: 168 LBS | OXYGEN SATURATION: 97 % | TEMPERATURE: 98 F | SYSTOLIC BLOOD PRESSURE: 109 MMHG | DIASTOLIC BLOOD PRESSURE: 71 MMHG | BODY MASS INDEX: 32.98 KG/M2 | RESPIRATION RATE: 20 BRPM | HEART RATE: 88 BPM

## 2024-02-07 DIAGNOSIS — R05.9 COUGH, UNSPECIFIED TYPE: ICD-10-CM

## 2024-02-07 DIAGNOSIS — Z87.09 HISTORY OF STREP PHARYNGITIS: Primary | ICD-10-CM

## 2024-02-07 DIAGNOSIS — J06.9 UPPER RESPIRATORY TRACT INFECTION, UNSPECIFIED TYPE: ICD-10-CM

## 2024-02-07 DIAGNOSIS — J02.9 SORE THROAT: ICD-10-CM

## 2024-02-07 LAB
CTP QC/QA: YES
FLUAV AG NPH QL: NEGATIVE
FLUBV AG NPH QL: NEGATIVE
S PYO RRNA THROAT QL PROBE: NEGATIVE
SARS-COV-2 AG RESP QL IA.RAPID: NEGATIVE

## 2024-02-07 PROCEDURE — 87880 STREP A ASSAY W/OPTIC: CPT | Mod: QW,,, | Performed by: STUDENT IN AN ORGANIZED HEALTH CARE EDUCATION/TRAINING PROGRAM

## 2024-02-07 PROCEDURE — 99214 OFFICE O/P EST MOD 30 MIN: CPT | Mod: 25,S$GLB,, | Performed by: STUDENT IN AN ORGANIZED HEALTH CARE EDUCATION/TRAINING PROGRAM

## 2024-02-07 PROCEDURE — 96372 THER/PROPH/DIAG INJ SC/IM: CPT | Mod: S$GLB,,, | Performed by: STUDENT IN AN ORGANIZED HEALTH CARE EDUCATION/TRAINING PROGRAM

## 2024-02-07 PROCEDURE — 87811 SARS-COV-2 COVID19 W/OPTIC: CPT | Mod: QW,S$GLB,, | Performed by: STUDENT IN AN ORGANIZED HEALTH CARE EDUCATION/TRAINING PROGRAM

## 2024-02-07 PROCEDURE — 87804 INFLUENZA ASSAY W/OPTIC: CPT | Mod: 59,QW,, | Performed by: STUDENT IN AN ORGANIZED HEALTH CARE EDUCATION/TRAINING PROGRAM

## 2024-02-07 RX ORDER — DEXAMETHASONE SODIUM PHOSPHATE 100 MG/10ML
10 INJECTION INTRAMUSCULAR; INTRAVENOUS
Status: COMPLETED | OUTPATIENT
Start: 2024-02-07 | End: 2024-02-07

## 2024-02-07 RX ORDER — ALBUTEROL SULFATE 90 UG/1
1-2 AEROSOL, METERED RESPIRATORY (INHALATION) EVERY 6 HOURS PRN
Qty: 18 G | Refills: 0 | Status: SHIPPED | OUTPATIENT
Start: 2024-02-07

## 2024-02-07 RX ORDER — CEFTRIAXONE 1 G/1
1 INJECTION, POWDER, FOR SOLUTION INTRAMUSCULAR; INTRAVENOUS
Status: COMPLETED | OUTPATIENT
Start: 2024-02-07 | End: 2024-02-07

## 2024-02-07 RX ORDER — AMOXICILLIN AND CLAVULANATE POTASSIUM 875; 125 MG/1; MG/1
1 TABLET, FILM COATED ORAL EVERY 12 HOURS
Qty: 20 TABLET | Refills: 0 | Status: SHIPPED | OUTPATIENT
Start: 2024-02-07 | End: 2024-02-17

## 2024-02-07 RX ORDER — DEXBROMPHENIRAMINE MALEATE, DEXTROMETHORPHAN HYDROBROMIDE AND PHENYLEPHRINE HYDROCHLORIDE 2; 15; 7.5 MG/5ML; MG/5ML; MG/5ML
5 LIQUID ORAL 4 TIMES DAILY PRN
Qty: 118 ML | Refills: 0 | Status: SHIPPED | OUTPATIENT
Start: 2024-02-07 | End: 2024-05-06

## 2024-02-07 RX ADMIN — CEFTRIAXONE 1 G: 1 INJECTION, POWDER, FOR SOLUTION INTRAMUSCULAR; INTRAVENOUS at 02:02

## 2024-02-07 RX ADMIN — DEXAMETHASONE SODIUM PHOSPHATE 10 MG: 100 INJECTION INTRAMUSCULAR; INTRAVENOUS at 02:02

## 2024-02-07 NOTE — PROGRESS NOTES
Subjective:      Patient ID: Corina Liu is a 38 y.o. female.    Vitals:  height is 5' (1.524 m) and weight is 76.2 kg (168 lb). Her temperature is 98.1 °F (36.7 °C). Her blood pressure is 109/71 and her pulse is 88. Her respiration is 20 and oxygen saturation is 97%.     Chief Complaint: Sore Throat    Patient is a 38-year-old female with past medical history of PTSD, anxiety, hepatitis-C, thyroid disease, thyroid cancer, GERD, PCOS, mi, and COPD who presents to clinic for evaluation of sore throat and URI related symptoms.  Patient reports symptoms for approximately 1 week..  Patient reports she has not vaccinated.  Patient reports she has a child positive with strep throat.  Patient states taking over-the-counter medications with no significant relief to symptoms.  Patient previously seen in clinic on January 30, 2024 and diagnosed with strep pharyngitis.  Patient was placed on amoxicillin.  Patient states that medication is not relieving symptoms.  Patient states actually feels worse.  Patient requesting injections and possibly change to medication.    Sore Throat   This is a new problem. The problem has been gradually worsening. Associated symptoms include congestion, coughing, ear pain, headaches and a hoarse voice. Pertinent negatives include no abdominal pain, diarrhea, drooling, ear discharge, shortness of breath or vomiting. She has had exposure to strep. Treatments tried: tylenol and antibiotic. The treatment provided mild relief.       Constitution: Positive for chills and fatigue.   HENT:  Positive for ear pain, congestion, postnasal drip and sore throat. Negative for ear discharge, tinnitus, hearing loss and drooling.    Neck: neck negative.   Cardiovascular: Negative.  Negative for chest pain and palpitations.   Eyes: Negative.    Respiratory:  Positive for cough and sputum production. Negative for chest tightness, shortness of breath and wheezing.    Gastrointestinal: Negative.  Negative  for abdominal pain, nausea, vomiting and diarrhea.   Endocrine: negative.   Genitourinary: Negative.  Negative for dysuria, frequency and urgency.   Musculoskeletal: Negative.  Negative for muscle ache.   Skin: Negative.  Negative for color change, pale, rash and erythema.   Allergic/Immunologic: Negative.    Neurological:  Positive for headaches. Negative for dizziness, light-headedness, passing out, disorientation and altered mental status.   Hematologic/Lymphatic: Negative.    Psychiatric/Behavioral: Negative.  Negative for altered mental status, disorientation and confusion.       Objective:     Physical Exam   Constitutional: She is oriented to person, place, and time. She appears well-developed. She is cooperative.  Non-toxic appearance. She appears ill. No distress.   HENT:   Head: Normocephalic and atraumatic.   Ears:   Right Ear: Hearing, tympanic membrane, external ear and ear canal normal.   Left Ear: Hearing, tympanic membrane, external ear and ear canal normal.   Nose: Congestion present. No mucosal edema, rhinorrhea or nasal deformity. No epistaxis. Right sinus exhibits no maxillary sinus tenderness and no frontal sinus tenderness. Left sinus exhibits no maxillary sinus tenderness and no frontal sinus tenderness.   Mouth/Throat: Uvula is midline and mucous membranes are normal. Mucous membranes are moist. No trismus in the jaw. Normal dentition. No uvula swelling. Posterior oropharyngeal erythema and cobblestoning present. No oropharyngeal exudate. Oropharynx is clear.   Eyes: Conjunctivae and lids are normal. Pupils are equal, round, and reactive to light. Right eye exhibits no discharge. Left eye exhibits no discharge. No scleral icterus.   Neck: Trachea normal and phonation normal. Neck supple. No neck rigidity present.   Cardiovascular: Normal rate, regular rhythm, normal heart sounds and normal pulses.   Pulmonary/Chest: Effort normal. No respiratory distress. She has no wheezes. She has rhonchi  (Cleared with cough) in the right upper field. She has no rales.   Abdominal: Normal appearance and bowel sounds are normal. She exhibits no distension. Soft. There is no abdominal tenderness.   Musculoskeletal: Normal range of motion.         General: Normal range of motion.      Cervical back: She exhibits no tenderness.   Lymphadenopathy:     She has cervical adenopathy.   Neurological: She is alert and oriented to person, place, and time. She exhibits normal muscle tone.   Skin: Skin is warm, dry, intact, not diaphoretic, not pale and no rash. Capillary refill takes less than 2 seconds. No erythema   Psychiatric: Her speech is normal and behavior is normal. Judgment and thought content normal.   Nursing note and vitals reviewed.      Assessment:     1. History of strep pharyngitis    2. Sore throat    3. Cough, unspecified type    4. Upper respiratory tract infection, unspecified type        Plan:       History of strep pharyngitis    Sore throat  -     SARS Coronavirus 2 Antigen, POCT Manual Read  -     POCT Influenza A/B Rapid Antigen  -     POCT rapid strep A    Cough, unspecified type  -     SARS Coronavirus 2 Antigen, POCT Manual Read  -     POCT Influenza A/B Rapid Antigen    Upper respiratory tract infection, unspecified type    Other orders  -     cefTRIAXone injection 1 g  -     dexAMETHasone injection 10 mg  -     amoxicillin-clavulanate 875-125mg (AUGMENTIN) 875-125 mg per tablet; Take 1 tablet by mouth every 12 (twelve) hours. for 10 days  Dispense: 20 tablet; Refill: 0  -     dexbrompheniramine-phenylep-DM (POLYTUSSIN DM,DEXBROMPHENIRMN,) 2-7.5-15 mg/5 mL Liqd; Take 5 mLs by mouth 4 (four) times daily as needed (Cough/congestion).  Dispense: 118 mL; Refill: 0  -     albuterol (VENTOLIN HFA) 90 mcg/actuation inhaler; Inhale 1-2 puffs into the lungs every 6 (six) hours as needed for Wheezing or Shortness of Breath. Rescue  Dispense: 18 g; Refill: 0                Previous visit reviewed.  Labs:   Influenza a and B negative.  COVID negative.  Rapid strep negative.  Rocephin 1 g IM and Decadron 10 mg IM in clinic per patient request.  Patient tolerated well.  No complications noted.  Take medications as prescribed.  Tylenol/Motrin per package instructions for any pain or fever.  Recommend warm salt water gargles every 2-3 hours while awake.  Recommend replacing toothbrush and washing linens in hot water 48 hours after starting antibiotics.  Follow-up with PCP in 1-2 days.  Follow-up ENT as needed.  Return to clinic as needed.  To ED for any new or acutely worsening symptoms.  Patient in agreement with plan of care.     DISCLAIMER: Please note that my documentation in this Electronic Healthcare Record was produced using speech recognition software and therefore may contain errors related to that software system.These could include grammar, punctuation and spelling errors or the inclusion/exclusion of phrases that were not intended. Garbled syntax, mangled pronouns, and other bizarre constructions may be attributed to that software system.

## 2024-04-10 NOTE — PATIENT INSTRUCTIONS
[facility]  [phone]      Symptomatic treatment:    Alternate Tylenol and Ibuprofen every 3 hrs for fever, pain and inflammation. Avoid Nsaids if you are pregnant or have advanced kidney disease.     salt water gargles to soothe throat from post nasal drip  Honey/lemon water or warm tea to soothe throat from post nasal drip  Cepachol helps to soothe the discomfort in throat from post nasal drip    Cold-eeze helps to reduce the duration of URI symptoms if taken early  Elderberry to reduce duration of viral URI symptoms    Nasal saline spray reduces inflammation and dryness  Warm face compresses/hot showers as often as you can to open sinuses and allow to drain.   Flonase OTC or Nasacort OTC to help decrease inflammation in nasal turbinates and allow sinuses to drain    Vicks vapor rub at night  Simple foods like chicken noodle soup help provide hydration and nutrition    Delsym helps with coughing at night    Zantac will help if there is reflux from the post nasal drip and helpful to take at night    Zyrtec/Claritin during the day and Benadryl at night may help if allergy component concurrently with URI    Rest as much as you can    Your symptoms will likely last 5-7 days, maybe longer depending on how it affects your body.  You are contagious 5-7, so minimize contact with others to reduce the spread to others and stay home from work or school as we discussed. Dehydration is preventable but is one of the main reasons why you will feel so badly. Drink pedialyte, gatorade or propel. Stay hydrated.  Antibiotics are not needed unless a complication(such as Otitis Media, Bacterial sinus infection or pneumonia) develops. Taking antibiotics for Flu/Cold is not supported by evidence-based medicine and can expose you to unnecessary side effects of the medication, such as anaphylaxis, yeast infection and leads to antibiotic resistance.   If you experience any:  Chest pain, shortness of breath, wheezing or difficulty  breathing,  Severe headache, face, neck or ear pain,  New rash,  Fever over 101.5º F (38.6 C) for more than three days,  Confusion, behavior change or seizure,  Severe weakness or dizziness, please go to the ER immediately for further testing.              None

## 2024-05-03 ENCOUNTER — TELEPHONE (OUTPATIENT)
Dept: FAMILY MEDICINE | Facility: CLINIC | Age: 39
End: 2024-05-03
Payer: MEDICAID

## 2024-05-03 NOTE — TELEPHONE ENCOUNTER
----- Message from Anju Garcia sent at 5/3/2024  1:08 PM CDT -----  Contact: Patient  Type:  Patient Returning Call    Who Called:Patient     Who Left Message for Patient:Unknown/ Call not charted    Does the patient know what this is regarding?:Appt     Would the patient rather a call back or a response via [facility]? Call    Best Call Back Number:[phone] (home)     Additional Information: Patient states that she was called to RS her appt. No appts show until May14th and there is not a reschedule indicator next to appt. Please call to advise       Patient lives 30 minutes away so she would need to know soon

## 2024-05-06 ENCOUNTER — OFFICE VISIT (OUTPATIENT)
Dept: FAMILY MEDICINE | Facility: CLINIC | Age: 39
End: 2024-05-06
Payer: MEDICAID

## 2024-05-06 VITALS
WEIGHT: 172.63 LBS | RESPIRATION RATE: 12 BRPM | OXYGEN SATURATION: 98 % | HEART RATE: 95 BPM | DIASTOLIC BLOOD PRESSURE: 82 MMHG | TEMPERATURE: 99 F | SYSTOLIC BLOOD PRESSURE: 128 MMHG | BODY MASS INDEX: 33.71 KG/M2

## 2024-05-06 DIAGNOSIS — F17.200 NICOTINE DEPENDENCE WITH CURRENT USE: ICD-10-CM

## 2024-05-06 DIAGNOSIS — A60.04 HERPES SIMPLEX VULVOVAGINITIS: ICD-10-CM

## 2024-05-06 DIAGNOSIS — G63 VITAMIN B12 DEFICIENCY NEUROPATHY: ICD-10-CM

## 2024-05-06 DIAGNOSIS — K21.9 GASTROESOPHAGEAL REFLUX DISEASE, UNSPECIFIED WHETHER ESOPHAGITIS PRESENT: ICD-10-CM

## 2024-05-06 DIAGNOSIS — Z79.899 ENCOUNTER FOR LONG-TERM CURRENT USE OF HIGH RISK MEDICATION: ICD-10-CM

## 2024-05-06 DIAGNOSIS — Z13.220 ENCOUNTER FOR LIPID SCREENING FOR CARDIOVASCULAR DISEASE: ICD-10-CM

## 2024-05-06 DIAGNOSIS — E53.8 VITAMIN B12 DEFICIENCY NEUROPATHY: ICD-10-CM

## 2024-05-06 DIAGNOSIS — Z13.6 ENCOUNTER FOR LIPID SCREENING FOR CARDIOVASCULAR DISEASE: ICD-10-CM

## 2024-05-06 DIAGNOSIS — M48.00 SPINAL STENOSIS, UNSPECIFIED SPINAL REGION: ICD-10-CM

## 2024-05-06 DIAGNOSIS — Z00.00 WELLNESS EXAMINATION: Primary | ICD-10-CM

## 2024-05-06 DIAGNOSIS — M54.17 LUMBOSACRAL RADICULOPATHY: ICD-10-CM

## 2024-05-06 DIAGNOSIS — M51.36 DDD (DEGENERATIVE DISC DISEASE), LUMBAR: ICD-10-CM

## 2024-05-06 DIAGNOSIS — E89.0 HISTORY OF THYROIDECTOMY: ICD-10-CM

## 2024-05-06 DIAGNOSIS — G47.00 INSOMNIA, UNSPECIFIED TYPE: ICD-10-CM

## 2024-05-06 DIAGNOSIS — Z86.2 HISTORY OF ANEMIA: ICD-10-CM

## 2024-05-06 DIAGNOSIS — E07.9 THYROID DISEASE: ICD-10-CM

## 2024-05-06 DIAGNOSIS — F41.9 ANXIETY: ICD-10-CM

## 2024-05-06 DIAGNOSIS — F43.21 GRIEF: ICD-10-CM

## 2024-05-06 PROCEDURE — 99999 PR PBB SHADOW E&M-EST. PATIENT-LVL IV: CPT | Mod: PBBFAC,,, | Performed by: NURSE PRACTITIONER

## 2024-05-06 PROCEDURE — 80307 DRUG TEST PRSMV CHEM ANLYZR: CPT | Performed by: NURSE PRACTITIONER

## 2024-05-06 PROCEDURE — 3074F SYST BP LT 130 MM HG: CPT | Mod: CPTII,,, | Performed by: NURSE PRACTITIONER

## 2024-05-06 PROCEDURE — 3079F DIAST BP 80-89 MM HG: CPT | Mod: CPTII,,, | Performed by: NURSE PRACTITIONER

## 2024-05-06 PROCEDURE — 3008F BODY MASS INDEX DOCD: CPT | Mod: CPTII,,, | Performed by: NURSE PRACTITIONER

## 2024-05-06 PROCEDURE — 99214 OFFICE O/P EST MOD 30 MIN: CPT | Mod: PBBFAC,PN | Performed by: NURSE PRACTITIONER

## 2024-05-06 PROCEDURE — 99214 OFFICE O/P EST MOD 30 MIN: CPT | Mod: S$PBB,,, | Performed by: NURSE PRACTITIONER

## 2024-05-06 RX ORDER — GABAPENTIN 600 MG/1
600 TABLET ORAL 3 TIMES DAILY
Qty: 90 TABLET | Refills: 5 | Status: SHIPPED | OUTPATIENT
Start: 2024-05-06 | End: 2024-11-02

## 2024-05-06 RX ORDER — VALACYCLOVIR HYDROCHLORIDE 1 G/1
1000 TABLET, FILM COATED ORAL DAILY
Qty: 20 TABLET | Refills: 0 | Status: SHIPPED | OUTPATIENT
Start: 2024-05-06

## 2024-05-06 RX ORDER — ALPRAZOLAM 0.5 MG/1
0.5 TABLET ORAL 3 TIMES DAILY PRN
Qty: 30 TABLET | Refills: 0 | Status: SHIPPED | OUTPATIENT
Start: 2024-05-06 | End: 2024-05-29 | Stop reason: SDUPTHER

## 2024-05-06 RX ORDER — LEVOTHYROXINE SODIUM 100 UG/1
100 TABLET ORAL
Qty: 90 TABLET | Refills: 0 | Status: SHIPPED | OUTPATIENT
Start: 2024-05-06

## 2024-05-06 RX ORDER — OMEPRAZOLE 40 MG/1
40 CAPSULE, DELAYED RELEASE ORAL EVERY MORNING
Qty: 90 CAPSULE | Refills: 3 | Status: SHIPPED | OUTPATIENT
Start: 2024-05-06
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
## 2024-05-07 LAB
AMPHET+METHAMPHET UR QL: NEGATIVE
BARBITURATES UR QL SCN>200 NG/ML: NEGATIVE
BENZODIAZ UR QL SCN>200 NG/ML: NEGATIVE
BZE UR QL SCN: NEGATIVE
CANNABINOIDS UR QL SCN: NEGATIVE
CREAT UR-MCNC: 75.5 MG/DL (ref 15–325)
METHADONE UR QL SCN>300 NG/ML: NEGATIVE
OPIATES UR QL SCN: NEGATIVE
PCP UR QL SCN>25 NG/ML: NEGATIVE
TOXICOLOGY INFORMATION: NORMAL

## 2024-05-09 ENCOUNTER — OFFICE VISIT (OUTPATIENT)
Dept: URGENT CARE | Facility: CLINIC | Age: 39
End: 2024-05-09
Payer: MEDICAID

## 2024-05-09 VITALS
HEART RATE: 95 BPM | DIASTOLIC BLOOD PRESSURE: 69 MMHG | TEMPERATURE: 99 F | SYSTOLIC BLOOD PRESSURE: 111 MMHG | RESPIRATION RATE: 16 BRPM | BODY MASS INDEX: 33.23 KG/M2 | OXYGEN SATURATION: 95 % | WEIGHT: 176 LBS | HEIGHT: 61 IN

## 2024-05-09 DIAGNOSIS — R05.9 COUGH, UNSPECIFIED TYPE: ICD-10-CM

## 2024-05-09 DIAGNOSIS — H66.91 ACUTE OTITIS MEDIA, RIGHT: Primary | ICD-10-CM

## 2024-05-09 DIAGNOSIS — J02.0 STREP PHARYNGITIS: ICD-10-CM

## 2024-05-09 PROCEDURE — 99214 OFFICE O/P EST MOD 30 MIN: CPT | Mod: 25,S$GLB,, | Performed by: NURSE PRACTITIONER

## 2024-05-09 PROCEDURE — 87880 STREP A ASSAY W/OPTIC: CPT | Mod: QW,,, | Performed by: NURSE PRACTITIONER

## 2024-05-09 PROCEDURE — 87804 INFLUENZA ASSAY W/OPTIC: CPT | Mod: 59,QW,, | Performed by: NURSE PRACTITIONER

## 2024-05-09 PROCEDURE — 87811 SARS-COV-2 COVID19 W/OPTIC: CPT | Mod: QW,S$GLB,, | Performed by: NURSE PRACTITIONER

## 2024-05-09 RX ORDER — PROMETHAZINE HYDROCHLORIDE AND DEXTROMETHORPHAN HYDROBROMIDE 6.25; 15 MG/5ML; MG/5ML
5 SYRUP ORAL EVERY 4 HOURS PRN
Qty: 180 ML | Refills: 0 | Status: SHIPPED | OUTPATIENT
Start: 2024-05-09 | End: 2024-05-19

## 2024-05-09 RX ORDER — AMOXICILLIN AND CLAVULANATE POTASSIUM 875; 125 MG/1; MG/1
1 TABLET, FILM COATED ORAL EVERY 12 HOURS
Qty: 20 TABLET | Refills: 0 | Status: SHIPPED | OUTPATIENT
Start: 2024-05-09 | End: 2024-05-19

## 2024-05-09 NOTE — PROGRESS NOTES
"Subjective:      Patient ID: Corina Liu is a 38 y.o. female.    Vitals:  height is 5' 1" (1.549 m) and weight is 79.8 kg (176 lb). Her temperature is 98.5 °F (36.9 °C). Her blood pressure is 111/69 and her pulse is 95. Her respiration is 16 and oxygen saturation is 95%.     Chief Complaint: Cough    Reports exposure to son who Is diagnosed with strep    Cough  This is a new problem. Episode onset: x 2 weeks ago. The problem has been gradually worsening. Associated symptoms include ear pain, a fever, headaches, myalgias, nasal congestion, postnasal drip and a sore throat. Pertinent negatives include no chest pain, chills, eye redness, rash, shortness of breath or wheezing. Associated symptoms comments: Right ear. Treatments tried: tylenol, nyquil. There is no history of environmental allergies.       Constitution: Positive for fever. Negative for activity change, appetite change, chills, fatigue, unexpected weight change and generalized weakness.   HENT:  Positive for ear pain, congestion, postnasal drip and sore throat. Negative for ear discharge, foreign body in ear, tinnitus, hearing loss, dental problem, mouth sores, tongue pain, facial swelling, sinus pain, sinus pressure, trouble swallowing and voice change.    Neck: Negative for neck pain, neck stiffness and painful lymph nodes.   Cardiovascular:  Negative for chest pain, leg swelling, palpitations and sob on exertion.   Eyes:  Negative for eye trauma, eye discharge, eye itching, eye pain, eye redness, vision loss and eyelid swelling.   Respiratory:  Positive for cough. Negative for chest tightness, sputum production, COPD, shortness of breath, wheezing and asthma.    Gastrointestinal:  Negative for abdominal pain, nausea, vomiting, constipation, diarrhea, bright red blood in stool and dark colored stools.   Endocrine: hair loss, cold intolerance and heat intolerance.   Genitourinary:  Negative for dysuria, frequency, urgency and hematuria. "   Musculoskeletal:  Positive for muscle ache. Negative for pain, trauma, joint pain, joint swelling and abnormal ROM of joint.   Skin:  Negative for color change, pale, rash, wound and hives.   Allergic/Immunologic: Negative for environmental allergies, seasonal allergies, food allergies, asthma, hives and itching.   Neurological:  Positive for headaches. Negative for dizziness, history of vertigo, light-headedness, facial drooping, speech difficulty, disorientation, altered mental status, loss of consciousness and numbness.   Hematologic/Lymphatic: Negative for swollen lymph nodes and easy bruising/bleeding. Does not bruise/bleed easily.   Psychiatric/Behavioral:  Negative for altered mental status, disorientation, confusion, agitation, sleep disturbance and hallucinations.       Objective:     Physical Exam   Constitutional: She is oriented to person, place, and time. She appears well-developed. She is cooperative.   HENT:   Head: Normocephalic and atraumatic.   Ears:   Right Ear: Hearing, external ear and ear canal normal. Tympanic membrane is erythematous. A middle ear effusion is present.   Left Ear: Hearing, tympanic membrane, external ear and ear canal normal.   Nose: Nose normal. No mucosal edema or nasal deformity. No epistaxis. Right sinus exhibits no maxillary sinus tenderness and no frontal sinus tenderness. Left sinus exhibits no maxillary sinus tenderness and no frontal sinus tenderness.   Mouth/Throat: Uvula is midline and mucous membranes are normal. No trismus in the jaw. Normal dentition. No uvula swelling. Oropharyngeal exudate, posterior oropharyngeal edema and posterior oropharyngeal erythema present. Tonsillar exudate.   Eyes: Conjunctivae and lids are normal.   Neck: Trachea normal and phonation normal. Neck supple.   Cardiovascular: Normal rate, regular rhythm, normal heart sounds and normal pulses.   Pulmonary/Chest: Effort normal and breath sounds normal.   Abdominal: Normal appearance and  bowel sounds are normal. Soft.   Musculoskeletal: Normal range of motion.         General: Normal range of motion.   Lymphadenopathy:     She has cervical adenopathy.   Neurological: She is alert and oriented to person, place, and time. She exhibits normal muscle tone.   Skin: Skin is warm, dry and intact.   Psychiatric: Her speech is normal and behavior is normal. Judgment and thought content normal.   Nursing note and vitals reviewed.      Assessment:     1. Acute otitis media, right    2. Cough, unspecified type    3. Strep pharyngitis        Plan:       Acute otitis media, right  -     amoxicillin-clavulanate 875-125mg (AUGMENTIN) 875-125 mg per tablet; Take 1 tablet by mouth every 12 (twelve) hours. for 10 days  Dispense: 20 tablet; Refill: 0    Cough, unspecified type  -     POCT Influenza A/B Rapid Antigen  -     SARS Coronavirus 2 Antigen, POCT Manual Read  -     POCT rapid strep A  -     promethazine-dextromethorphan (PROMETHAZINE-DM) 6.25-15 mg/5 mL Syrp; Take 5 mLs by mouth every 4 (four) hours as needed (cough).  Dispense: 180 mL; Refill: 0    Strep pharyngitis  -     amoxicillin-clavulanate 875-125mg (AUGMENTIN) 875-125 mg per tablet; Take 1 tablet by mouth every 12 (twelve) hours. for 10 days  Dispense: 20 tablet; Refill: 0      Utilize over-the-counter Tylenol or Motrin as directed for fever.    Ensure adequate fluid intake with electrolytes.    Return to clinic for new or worsening symptoms.  Patient is recommended to follow-up with their PCP post discharge.    Total time spent on med rec, H&P, with over half of the time in direct patient care: 36 minutes         Additional MDM:     Heart Failure Score:   COPD = No

## 2024-05-10 ENCOUNTER — TELEPHONE (OUTPATIENT)
Dept: FAMILY MEDICINE | Facility: CLINIC | Age: 39
End: 2024-05-10
Payer: MEDICAID

## 2024-05-10 DIAGNOSIS — E89.0 HISTORY OF THYROIDECTOMY: Primary | ICD-10-CM

## 2024-05-10 NOTE — TELEPHONE ENCOUNTER
Received faxed laboratory results from [facility]  Collected: 5/8/2024    TSH: 38.85 (high)  Have you been taking your thyroid medication every morning on an empty stomach?    B12:   normal    CBC:  No anemia, infection or blood loss noted.     Lipid Panel:  Normal.     Iron: normal     CMP:  Kidney function normal  Electrolytes are normal  Liver function normal    Continue current medication regimen

## 2024-05-15 ENCOUNTER — CLINICAL SUPPORT (OUTPATIENT)
Dept: SMOKING CESSATION | Facility: CLINIC | Age: 39
End: 2024-05-15
Payer: COMMERCIAL

## 2024-05-15 DIAGNOSIS — F17.210 MODERATE CIGARETTE SMOKER (10-19 PER DAY): Primary | ICD-10-CM

## 2024-05-15 RX ORDER — IBUPROFEN 200 MG
1 TABLET ORAL DAILY
Qty: 28 PATCH | Refills: 0 | Status: SHIPPED | OUTPATIENT
Start: 2024-05-15

## 2024-05-15 RX ORDER — DM/P-EPHED/ACETAMINOPH/DOXYLAM 30-7.5/3
2 LIQUID (ML) ORAL
Qty: 108 LOZENGE | Refills: 0 | Status: SHIPPED | OUTPATIENT
Start: 2024-05-15

## 2024-05-15 NOTE — Clinical Note
Patient is smoking 1ppd.  Patient will begin on 21 mg nicotine patch and 2 mg nicotine lozenge.  We discussed self awarness, triggers, tracking usage, reduction/treatment plan and follow up.

## 2024-05-29 DIAGNOSIS — G47.00 INSOMNIA, UNSPECIFIED TYPE: ICD-10-CM

## 2024-05-29 DIAGNOSIS — F43.21 GRIEF: ICD-10-CM

## 2024-05-29 DIAGNOSIS — F41.9 ANXIETY: ICD-10-CM

## 2024-05-30 RX ORDER — ALPRAZOLAM 0.5 MG/1
0.5 TABLET ORAL 3 TIMES DAILY PRN
Qty: 30 TABLET | Refills: 0 | Status: SHIPPED | OUTPATIENT
Start: 2024-05-30 | End: 2024-06-09

## 2024-05-30 NOTE — TELEPHONE ENCOUNTER
Prescription drug monitoring program has been reviewed and is consistent with patient's prescription history. There is no evidence of early fills or obtaining controlled rx's from multiple providers.   
See refil request  
details…
77.1
regular rate and rhythm/S1 S2 present

## 2024-06-05 ENCOUNTER — TELEPHONE (OUTPATIENT)
Dept: SMOKING CESSATION | Facility: CLINIC | Age: 39
End: 2024-06-05
Payer: MEDICAID

## 2024-06-05 NOTE — TELEPHONE ENCOUNTER
.Telephoned patient for follow up.  Patient was unavailable at this time.  I left a detailed message with nature of call and contact information.

## 2024-06-18 ENCOUNTER — OFFICE VISIT (OUTPATIENT)
Dept: URGENT CARE | Facility: CLINIC | Age: 39
End: 2024-06-18
Payer: MEDICAID

## 2024-06-18 VITALS
HEIGHT: 61 IN | RESPIRATION RATE: 22 BRPM | WEIGHT: 177 LBS | OXYGEN SATURATION: 98 % | TEMPERATURE: 98 F | DIASTOLIC BLOOD PRESSURE: 68 MMHG | HEART RATE: 82 BPM | BODY MASS INDEX: 33.42 KG/M2 | SYSTOLIC BLOOD PRESSURE: 102 MMHG

## 2024-06-18 DIAGNOSIS — Z20.818 STREPTOCOCCUS EXPOSURE: Primary | ICD-10-CM

## 2024-06-18 DIAGNOSIS — J40 BRONCHITIS: ICD-10-CM

## 2024-06-18 PROCEDURE — 87804 INFLUENZA ASSAY W/OPTIC: CPT | Mod: QW,,, | Performed by: STUDENT IN AN ORGANIZED HEALTH CARE EDUCATION/TRAINING PROGRAM

## 2024-06-18 PROCEDURE — 87880 STREP A ASSAY W/OPTIC: CPT | Mod: QW,,, | Performed by: STUDENT IN AN ORGANIZED HEALTH CARE EDUCATION/TRAINING PROGRAM

## 2024-06-18 PROCEDURE — 99214 OFFICE O/P EST MOD 30 MIN: CPT | Mod: 25,S$GLB,, | Performed by: STUDENT IN AN ORGANIZED HEALTH CARE EDUCATION/TRAINING PROGRAM

## 2024-06-18 PROCEDURE — 87811 SARS-COV-2 COVID19 W/OPTIC: CPT | Mod: QW,S$GLB,, | Performed by: STUDENT IN AN ORGANIZED HEALTH CARE EDUCATION/TRAINING PROGRAM

## 2024-06-18 PROCEDURE — 94640 AIRWAY INHALATION TREATMENT: CPT | Mod: S$GLB,,, | Performed by: STUDENT IN AN ORGANIZED HEALTH CARE EDUCATION/TRAINING PROGRAM

## 2024-06-18 RX ORDER — AZITHROMYCIN 250 MG/1
TABLET, FILM COATED ORAL
Qty: 6 TABLET | Refills: 0 | Status: SHIPPED | OUTPATIENT
Start: 2024-06-18

## 2024-06-18 RX ORDER — PREDNISONE 20 MG/1
40 TABLET ORAL DAILY
Qty: 10 TABLET | Refills: 0 | Status: SHIPPED | OUTPATIENT
Start: 2024-06-18 | End: 2024-06-23

## 2024-06-18 RX ORDER — ALBUTEROL SULFATE 1.25 MG/3ML
2.5 SOLUTION RESPIRATORY (INHALATION)
Status: COMPLETED | OUTPATIENT
Start: 2024-06-18 | End: 2024-06-18

## 2024-06-18 RX ORDER — GUAIFENESIN 600 MG/1
1200 TABLET, EXTENDED RELEASE ORAL 2 TIMES DAILY
Qty: 30 TABLET | Refills: 0 | Status: SHIPPED | OUTPATIENT
Start: 2024-06-18

## 2024-06-18 RX ORDER — IPRATROPIUM BROMIDE 0.5 MG/2.5ML
0.5 SOLUTION RESPIRATORY (INHALATION)
Status: COMPLETED | OUTPATIENT
Start: 2024-06-18 | End: 2024-06-18

## 2024-06-18 RX ORDER — LEVOCETIRIZINE DIHYDROCHLORIDE 5 MG/1
5 TABLET, FILM COATED ORAL NIGHTLY
Qty: 30 TABLET | Refills: 0 | Status: SHIPPED | OUTPATIENT
Start: 2024-06-18 | End: 2025-06-18

## 2024-06-18 RX ADMIN — IPRATROPIUM BROMIDE 0.5 MG: 0.5 SOLUTION RESPIRATORY (INHALATION) at 05:06

## 2024-06-18 RX ADMIN — ALBUTEROL SULFATE 2.5 MG: 1.25 SOLUTION RESPIRATORY (INHALATION) at 05:06

## 2024-06-18 NOTE — PROGRESS NOTES
"Subjective:      Patient ID: Corina Liu is a 38 y.o. female.    Vitals:  height is 5' 1" (1.549 m) and weight is 80.3 kg (177 lb). Her temperature is 98.3 °F (36.8 °C). Her blood pressure is 102/68 and her pulse is 82. Her respiration is 22 (abnormal) and oxygen saturation is 98%.     Chief Complaint: Cough    Ambulatory to room with complaint of cough and congestion sinus pressure, wheezing, symptoms began yesterday, denies subjective fevers, but unsure.    Cough  This is a new problem. The current episode started yesterday. The problem has been gradually worsening. The cough is Productive of sputum. Associated symptoms include ear pain, myalgias, nasal congestion and a sore throat. Pertinent negatives include no fever. Treatments tried: mucinex DM. The treatment provided mild relief.       Constitution: Negative. Negative for fever.   HENT:  Positive for ear pain and sore throat.    Neck: neck negative.   Cardiovascular: Negative.    Eyes: Negative.    Respiratory:  Positive for cough.    Gastrointestinal: Negative.    Genitourinary: Negative.    Musculoskeletal:  Positive for muscle ache.   Skin: Negative.    Allergic/Immunologic: Negative.    Psychiatric/Behavioral: Negative.        Objective:     Physical Exam   Constitutional: She is oriented to person, place, and time. She appears well-developed. She is cooperative.  Non-toxic appearance. She does not appear ill. No distress.   HENT:   Head: Normocephalic and atraumatic.   Ears:   Right Ear: Hearing, tympanic membrane, external ear and ear canal normal.   Left Ear: Hearing, tympanic membrane, external ear and ear canal normal.   Nose: Congestion present. No mucosal edema, rhinorrhea or nasal deformity. No epistaxis. Right sinus exhibits no maxillary sinus tenderness and no frontal sinus tenderness. Left sinus exhibits no maxillary sinus tenderness and no frontal sinus tenderness.   Mouth/Throat: Uvula is midline, oropharynx is clear and moist and " mucous membranes are normal. No trismus in the jaw. Normal dentition. No uvula swelling. No oropharyngeal exudate, posterior oropharyngeal edema or posterior oropharyngeal erythema.   Eyes: Conjunctivae and lids are normal. No scleral icterus.   Neck: Trachea normal and phonation normal. Neck supple. No edema present. No erythema present. No neck rigidity present.   Cardiovascular: Normal rate, regular rhythm, normal heart sounds and normal pulses.   Pulmonary/Chest: Effort normal. No respiratory distress. She has no decreased breath sounds. She has wheezes. She has rhonchi.   Abdominal: Normal appearance.   Musculoskeletal: Normal range of motion.         General: No deformity. Normal range of motion.   Neurological: She is alert and oriented to person, place, and time. She exhibits normal muscle tone. Coordination normal.   Skin: Skin is warm, dry, intact, not diaphoretic and not pale.   Psychiatric: Her speech is normal and behavior is normal. Judgment and thought content normal.   Nursing note and vitals reviewed.      Assessment:     1. Streptococcus exposure    2. Bronchitis        Plan:       Streptococcus exposure  -     POCT rapid strep A  -     POCT Influenza A/B Rapid Antigen  -     SARS Coronavirus 2 Antigen, POCT Manual Read    Bronchitis    Other orders  -     predniSONE (DELTASONE) 20 MG tablet; Take 2 tablets (40 mg total) by mouth once daily. for 5 days  Dispense: 10 tablet; Refill: 0  -     azithromycin (Z-ROGERIO) 250 MG tablet; Take 2 tablets by mouth on day 1; Take 1 tablet by mouth on days 2-5  Dispense: 6 tablet; Refill: 0  -     guaiFENesin (MUCINEX) 600 mg 12 hr tablet; Take 2 tablets (1,200 mg total) by mouth 2 (two) times daily.  Dispense: 30 tablet; Refill: 0  -     levocetirizine (XYZAL) 5 MG tablet; Take 1 tablet (5 mg total) by mouth every evening.  Dispense: 30 tablet; Refill: 0  -     albuterol nebulizer solution 2.5 mg  -     ipratropium 0.02 % nebulizer solution 0.5 mg           DuoNeb  given in clinic, after treatment completed breath sounds were improved.  Patient states was told at 1 time she had COPD, recommended steroid course of 5 days as well as a Z-Natanael.

## 2024-06-27 DIAGNOSIS — F41.9 ANXIETY: ICD-10-CM

## 2024-06-27 DIAGNOSIS — G47.00 INSOMNIA, UNSPECIFIED TYPE: ICD-10-CM

## 2024-06-27 DIAGNOSIS — F43.21 GRIEF: ICD-10-CM

## 2024-06-27 NOTE — TELEPHONE ENCOUNTER
Care Due:                  Date            Visit Type   Department     Provider  --------------------------------------------------------------------------------                                EP -                              PRIMARY      [facility]  Last Visit: 06-      CARE ([facility])   MEDICINE       Melva Brito  Next Visit: None Scheduled  None         None Found                                                            Last  Test          Frequency    Reason                     Performed    Due Date  --------------------------------------------------------------------------------    Office Visit  12 months..  nicotine.................  06- 06-    [facility] Embedded Care Due Messages. Reference number: [id number].   6/27/2024 11:55:11 AM CDT

## 2024-06-28 RX ORDER — ALPRAZOLAM 0.5 MG/1
0.5 TABLET ORAL 3 TIMES DAILY PRN
Qty: 30 TABLET | Refills: 0 | Status: SHIPPED | OUTPATIENT
Start: 2024-06-28 | End: 2024-07-08

## 2024-08-02 DIAGNOSIS — E07.9 THYROID DISEASE: ICD-10-CM

## 2024-08-02 DIAGNOSIS — E89.0 HISTORY OF THYROIDECTOMY: ICD-10-CM

## 2024-08-04 RX ORDER — LEVOTHYROXINE SODIUM 100 UG/1
100 TABLET ORAL
Qty: 90 TABLET | Refills: 0 | Status: SHIPPED | OUTPATIENT
Start: 2024-08-04

## 2024-08-07 ENCOUNTER — CLINICAL SUPPORT (OUTPATIENT)
Dept: SMOKING CESSATION | Facility: CLINIC | Age: 39
End: 2024-08-07

## 2024-08-07 DIAGNOSIS — F17.210 MODERATE CIGARETTE SMOKER (10-19 PER DAY): Primary | ICD-10-CM

## 2024-08-28 ENCOUNTER — NURSE TRIAGE (OUTPATIENT)
Dept: ADMINISTRATIVE | Facility: CLINIC | Age: 39
End: 2024-08-28

## 2024-08-28 NOTE — TELEPHONE ENCOUNTER
Patient requesting a drug screen in order to refill her Xanax and is also complaining of shoulder pain for the last 2 months. Dispo provided for shouler pain-go to ER/UC now. Will also send message to MD regarding request for lab test. Instructed to call back with additional questions or worsening of symptoms. Patient verbalized understanding.     Reason for Disposition   Entire arm is swollen    Additional Information   Negative: Shock suspected (e.g., cold/pale/clammy skin, too weak to stand, low BP, rapid pulse)   Negative: Similar pain previously and it was from 'heart attack'   Negative: Similar pain previously and it was from 'angina' and not relieved by nitroglycerin   Negative: Sounds like a life-threatening emergency to the triager   Negative: Difficulty breathing or unusual sweating (e.g., sweating without exertion)   Negative: Pain lasting > 5 minutes and pain also present in chest  (Exception: Pain is clearly made worse by movement.)   Negative: Age > 40 and no obvious cause and pain even when not moving the arm  (Exception: Pain is clearly made worse by moving arm or bending neck.)   Negative: Red area or streak and fever   Negative: Swollen joint and fever    Protocols used: Shoulder Pain-A-[place]